# Patient Record
Sex: MALE | Race: WHITE | NOT HISPANIC OR LATINO | URBAN - METROPOLITAN AREA
[De-identification: names, ages, dates, MRNs, and addresses within clinical notes are randomized per-mention and may not be internally consistent; named-entity substitution may affect disease eponyms.]

---

## 2017-01-05 ENCOUNTER — EMERGENCY (EMERGENCY)
Facility: HOSPITAL | Age: 67
LOS: 1 days | Discharge: HOME | End: 2017-01-05
Admitting: EMERGENCY MEDICINE
Payer: MEDICARE

## 2017-01-05 DIAGNOSIS — Z79.84 LONG TERM (CURRENT) USE OF ORAL HYPOGLYCEMIC DRUGS: ICD-10-CM

## 2017-01-05 DIAGNOSIS — F70 MILD INTELLECTUAL DISABILITIES: ICD-10-CM

## 2017-01-05 DIAGNOSIS — G40.909 EPILEPSY, UNSPECIFIED, NOT INTRACTABLE, WITHOUT STATUS EPILEPTICUS: ICD-10-CM

## 2017-01-05 DIAGNOSIS — E11.65 TYPE 2 DIABETES MELLITUS WITH HYPERGLYCEMIA: ICD-10-CM

## 2017-01-05 DIAGNOSIS — I10 ESSENTIAL (PRIMARY) HYPERTENSION: ICD-10-CM

## 2017-01-05 DIAGNOSIS — R73.9 HYPERGLYCEMIA, UNSPECIFIED: ICD-10-CM

## 2017-01-05 PROCEDURE — 99284 EMERGENCY DEPT VISIT MOD MDM: CPT

## 2017-02-05 LAB — PSA SERPL-MCNC: 0.82

## 2017-02-17 ENCOUNTER — APPOINTMENT (OUTPATIENT)
Dept: UROLOGY | Facility: CLINIC | Age: 67
End: 2017-02-17

## 2017-03-20 ENCOUNTER — APPOINTMENT (OUTPATIENT)
Dept: CARDIOLOGY | Facility: CLINIC | Age: 67
End: 2017-03-20

## 2017-03-23 ENCOUNTER — APPOINTMENT (OUTPATIENT)
Dept: CARDIOLOGY | Facility: CLINIC | Age: 67
End: 2017-03-23

## 2017-03-23 ENCOUNTER — NON-APPOINTMENT (OUTPATIENT)
Age: 67
End: 2017-03-23

## 2017-03-23 VITALS — DIASTOLIC BLOOD PRESSURE: 64 MMHG | RESPIRATION RATE: 14 BRPM | SYSTOLIC BLOOD PRESSURE: 102 MMHG

## 2017-03-23 VITALS — HEART RATE: 80 BPM | TEMPERATURE: 96.8 F | HEIGHT: 62 IN

## 2017-03-28 ENCOUNTER — RX RENEWAL (OUTPATIENT)
Age: 67
End: 2017-03-28

## 2017-04-07 ENCOUNTER — RX RENEWAL (OUTPATIENT)
Age: 67
End: 2017-04-07

## 2017-05-06 ENCOUNTER — RX RENEWAL (OUTPATIENT)
Age: 67
End: 2017-05-06

## 2017-05-09 ENCOUNTER — MEDICATION RENEWAL (OUTPATIENT)
Age: 67
End: 2017-05-09

## 2017-06-29 ENCOUNTER — NON-APPOINTMENT (OUTPATIENT)
Age: 67
End: 2017-06-29

## 2017-06-29 ENCOUNTER — APPOINTMENT (OUTPATIENT)
Dept: CARDIOLOGY | Facility: CLINIC | Age: 67
End: 2017-06-29

## 2017-06-29 VITALS — DIASTOLIC BLOOD PRESSURE: 68 MMHG | RESPIRATION RATE: 14 BRPM | SYSTOLIC BLOOD PRESSURE: 102 MMHG

## 2017-06-29 VITALS
HEIGHT: 62 IN | BODY MASS INDEX: 32.02 KG/M2 | WEIGHT: 174 LBS | OXYGEN SATURATION: 96 % | HEART RATE: 84 BPM | TEMPERATURE: 98.6 F

## 2017-07-01 ENCOUNTER — EMERGENCY (EMERGENCY)
Facility: HOSPITAL | Age: 67
LOS: 0 days | Discharge: HOME | End: 2017-07-01
Attending: EMERGENCY MEDICINE | Admitting: EMERGENCY MEDICINE
Payer: MEDICARE

## 2017-07-01 VITALS
HEIGHT: 62 IN | HEART RATE: 80 BPM | SYSTOLIC BLOOD PRESSURE: 110 MMHG | RESPIRATION RATE: 16 BRPM | WEIGHT: 199.96 LBS | OXYGEN SATURATION: 99 % | DIASTOLIC BLOOD PRESSURE: 64 MMHG

## 2017-07-01 VITALS
OXYGEN SATURATION: 100 % | SYSTOLIC BLOOD PRESSURE: 115 MMHG | DIASTOLIC BLOOD PRESSURE: 86 MMHG | HEART RATE: 74 BPM | RESPIRATION RATE: 15 BRPM

## 2017-07-01 DIAGNOSIS — R91.1 SOLITARY PULMONARY NODULE: ICD-10-CM
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
## 2017-07-01 DIAGNOSIS — G20 PARKINSON'S DISEASE: ICD-10-CM
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
## 2017-07-01 DIAGNOSIS — Z98.890 OTHER SPECIFIED POSTPROCEDURAL STATES: Chronic | ICD-10-CM

## 2017-07-01 DIAGNOSIS — Z98.890 OTHER SPECIFIED POSTPROCEDURAL STATES: ICD-10-CM

## 2017-07-01 DIAGNOSIS — S09.90XA UNSPECIFIED INJURY OF HEAD, INITIAL ENCOUNTER: ICD-10-CM

## 2017-07-01 DIAGNOSIS — I67.89 OTHER CEREBROVASCULAR DISEASE: ICD-10-CM

## 2017-07-01 PROCEDURE — 99285 EMERGENCY DEPT VISIT HI MDM: CPT

## 2017-07-01 PROCEDURE — 72190 X-RAY EXAM OF PELVIS: CPT | Mod: 26

## 2017-07-01 PROCEDURE — 70450 CT HEAD/BRAIN W/O DYE: CPT | Mod: 26

## 2017-07-01 PROCEDURE — 71010: CPT | Mod: 26

## 2017-07-01 PROCEDURE — 74176 CT ABD & PELVIS W/O CONTRAST: CPT | Mod: 26

## 2017-07-01 PROCEDURE — 72125 CT NECK SPINE W/O DYE: CPT | Mod: 26

## 2017-07-01 PROCEDURE — 71250 CT THORAX DX C-: CPT | Mod: 26

## 2017-07-01 NOTE — ED ADULT NURSE NOTE - CHPI ED SYMPTOMS NEG
no loss of consciousness/no numbness/no bleeding/no tingling/no vomiting/no fever/no deformity/no abrasion/no confusion

## 2017-07-01 NOTE — ED ADULT NURSE NOTE - OBJECTIVE STATEMENT
fall last night, denies hitting head as per aid. no known blood thinnerss. hx of parkinsons with increasing shakiness and needed assistance from group home. pt wears helmet at baseline.

## 2017-07-01 NOTE — ED PROVIDER NOTE - ENMT, MLM
Alert-The patient is alert, awake and responds to voice. The patient is oriented to time, place, and person. The triage nurse is able to obtain subjective information. Airway patent, Nasal mucosa clear. Mouth with normal mucosa. Throat has no vesicles, no oropharyngeal exudates and uvula is midline.

## 2017-07-01 NOTE — ED PROVIDER NOTE - CHPI ED SYMPTOMS NEG
no abrasion/no bleeding/no confusion/no vomiting/no loss of consciousness/no deformity/no tingling/no weakness/no fever/no numbness/NO PAIN

## 2017-07-01 NOTE — ED PROVIDER NOTE - CARE PLAN
Principal Discharge DX:	Closed head injury, initial encounter Principal Discharge DX:	Pulmonary nodule  Secondary Diagnosis:	Fall in home, initial encounter  Secondary Diagnosis:	Closed head injury, initial encounter

## 2017-07-01 NOTE — ED PROVIDER NOTE - PROGRESS NOTE DETAILS
no acute fx on ct.   pulmonary nodule and renal lesion to be follow ed with primary care  Return to the ER immediately for any worsening symptoms, concerns, chest pain, fevers, shortness of breath, vomiting, abdominal pain, rashes, neck pain, back pain, numbness, paresthesias, pain or any difficulties at all.  Please follow up with your own private physician or our medical clinic at [phone] in the next 2-3 days.  Find a doctor at [phone].  Copies of your tests were provided to you for follow-up.  You must address all your findings with your doctor.

## 2017-07-01 NOTE — ED PROVIDER NOTE - OBJECTIVE STATEMENT
68 y/o M with PMHx of Parkinson's disease, leg surgery was BIBA for a witnessed fall out of bed this morning. Pt denies LOC. Pt denies any complaints. No HA, SOB, back/neck pain, hip pain, extremity pain, abd pain, CP, fever.

## 2017-07-12 ENCOUNTER — APPOINTMENT (OUTPATIENT)
Dept: UROLOGY | Facility: CLINIC | Age: 67
End: 2017-07-12

## 2017-08-03 ENCOUNTER — EMERGENCY (EMERGENCY)
Facility: HOSPITAL | Age: 67
LOS: 1 days | Discharge: HOME | End: 2017-08-03
Attending: EMERGENCY MEDICINE | Admitting: EMERGENCY MEDICINE
Payer: MEDICARE

## 2017-08-03 VITALS
DIASTOLIC BLOOD PRESSURE: 68 MMHG | SYSTOLIC BLOOD PRESSURE: 102 MMHG | TEMPERATURE: 97 F | RESPIRATION RATE: 15 BRPM | OXYGEN SATURATION: 95 % | HEART RATE: 69 BPM

## 2017-08-03 VITALS
DIASTOLIC BLOOD PRESSURE: 72 MMHG | OXYGEN SATURATION: 98 % | TEMPERATURE: 99 F | RESPIRATION RATE: 16 BRPM | HEART RATE: 74 BPM | SYSTOLIC BLOOD PRESSURE: 104 MMHG | WEIGHT: 154.1 LBS

## 2017-08-03 DIAGNOSIS — R41.82 ALTERED MENTAL STATUS, UNSPECIFIED: ICD-10-CM

## 2017-08-03 DIAGNOSIS — Z98.890 OTHER SPECIFIED POSTPROCEDURAL STATES: Chronic | ICD-10-CM

## 2017-08-03 DIAGNOSIS — G20 PARKINSON'S DISEASE: ICD-10-CM

## 2017-08-03 DIAGNOSIS — F41.0 PANIC DISORDER [EPISODIC PAROXYSMAL ANXIETY]: ICD-10-CM

## 2017-08-03 LAB
ALBUMIN SERPL ELPH-MCNC: 3.1 G/DL — LOW (ref 3.3–5)
ALP SERPL-CCNC: 49 U/L — SIGNIFICANT CHANGE UP (ref 40–120)
ALT FLD-CCNC: 13 U/L — SIGNIFICANT CHANGE UP (ref 12–78)
ANION GAP SERPL CALC-SCNC: 10 MMOL/L — SIGNIFICANT CHANGE UP (ref 5–17)
APPEARANCE UR: CLEAR — SIGNIFICANT CHANGE UP
AST SERPL-CCNC: 12 U/L — LOW (ref 15–37)
BASOPHILS # BLD AUTO: 0.1 K/UL — SIGNIFICANT CHANGE UP (ref 0–0.2)
BASOPHILS NFR BLD AUTO: 1.7 % — SIGNIFICANT CHANGE UP (ref 0–2)
BILIRUB SERPL-MCNC: 0.3 MG/DL — SIGNIFICANT CHANGE UP (ref 0.2–1.2)
BILIRUB UR-MCNC: NEGATIVE — SIGNIFICANT CHANGE UP
BUN SERPL-MCNC: 15 MG/DL — SIGNIFICANT CHANGE UP (ref 7–23)
CALCIUM SERPL-MCNC: 8.2 MG/DL — LOW (ref 8.5–10.1)
CHLORIDE SERPL-SCNC: 97 MMOL/L — SIGNIFICANT CHANGE UP (ref 96–108)
CO2 SERPL-SCNC: 26 MMOL/L — SIGNIFICANT CHANGE UP (ref 22–31)
COLOR SPEC: YELLOW — SIGNIFICANT CHANGE UP
CREAT SERPL-MCNC: 0.57 MG/DL — SIGNIFICANT CHANGE UP (ref 0.5–1.3)
DIFF PNL FLD: NEGATIVE — SIGNIFICANT CHANGE UP
EOSINOPHIL # BLD AUTO: 0.1 K/UL — SIGNIFICANT CHANGE UP (ref 0–0.5)
EOSINOPHIL NFR BLD AUTO: 1.5 % — SIGNIFICANT CHANGE UP (ref 0–6)
GLUCOSE SERPL-MCNC: 115 MG/DL — HIGH (ref 70–99)
GLUCOSE UR QL: 50 MG/DL
HCT VFR BLD CALC: 39.6 % — SIGNIFICANT CHANGE UP (ref 39–50)
HGB BLD-MCNC: 13.4 G/DL — SIGNIFICANT CHANGE UP (ref 13–17)
KETONES UR-MCNC: ABNORMAL
LEUKOCYTE ESTERASE UR-ACNC: NEGATIVE — SIGNIFICANT CHANGE UP
LYMPHOCYTES # BLD AUTO: 2.5 K/UL — SIGNIFICANT CHANGE UP (ref 1–3.3)
LYMPHOCYTES # BLD AUTO: 36.4 % — SIGNIFICANT CHANGE UP (ref 13–44)
MCHC RBC-ENTMCNC: 32.2 PG — SIGNIFICANT CHANGE UP (ref 27–34)
MCHC RBC-ENTMCNC: 33.8 GM/DL — SIGNIFICANT CHANGE UP (ref 32–36)
MCV RBC AUTO: 95.2 FL — SIGNIFICANT CHANGE UP (ref 80–100)
MONOCYTES # BLD AUTO: 0.5 K/UL — SIGNIFICANT CHANGE UP (ref 0–0.9)
MONOCYTES NFR BLD AUTO: 7 % — SIGNIFICANT CHANGE UP (ref 1–9)
NEUTROPHILS # BLD AUTO: 3.7 K/UL — SIGNIFICANT CHANGE UP (ref 1.8–7.4)
NEUTROPHILS NFR BLD AUTO: 53.4 % — SIGNIFICANT CHANGE UP (ref 43–77)
NITRITE UR-MCNC: NEGATIVE — SIGNIFICANT CHANGE UP
PH UR: 7 — SIGNIFICANT CHANGE UP (ref 5–8)
PLATELET # BLD AUTO: 128 K/UL — LOW (ref 150–400)
POTASSIUM SERPL-MCNC: 4.4 MMOL/L — SIGNIFICANT CHANGE UP (ref 3.5–5.3)
POTASSIUM SERPL-SCNC: 4.4 MMOL/L — SIGNIFICANT CHANGE UP (ref 3.5–5.3)
PROT SERPL-MCNC: 6.3 G/DL — SIGNIFICANT CHANGE UP (ref 6–8.3)
PROT UR-MCNC: NEGATIVE — SIGNIFICANT CHANGE UP
RBC # BLD: 4.15 M/UL — LOW (ref 4.2–5.8)
RBC # FLD: 12.5 % — SIGNIFICANT CHANGE UP (ref 10.3–14.5)
SODIUM SERPL-SCNC: 133 MMOL/L — LOW (ref 135–145)
SP GR SPEC: 1 — LOW (ref 1.01–1.02)
UROBILINOGEN FLD QL: NEGATIVE — SIGNIFICANT CHANGE UP
WBC # BLD: 7 K/UL — SIGNIFICANT CHANGE UP (ref 3.8–10.5)
WBC # FLD AUTO: 7 K/UL — SIGNIFICANT CHANGE UP (ref 3.8–10.5)

## 2017-08-03 PROCEDURE — 80053 COMPREHEN METABOLIC PANEL: CPT

## 2017-08-03 PROCEDURE — 36415 COLL VENOUS BLD VENIPUNCTURE: CPT

## 2017-08-03 PROCEDURE — 81003 URINALYSIS AUTO W/O SCOPE: CPT

## 2017-08-03 PROCEDURE — 87086 URINE CULTURE/COLONY COUNT: CPT

## 2017-08-03 PROCEDURE — 93005 ELECTROCARDIOGRAM TRACING: CPT

## 2017-08-03 PROCEDURE — 99284 EMERGENCY DEPT VISIT MOD MDM: CPT

## 2017-08-03 PROCEDURE — 99283 EMERGENCY DEPT VISIT LOW MDM: CPT | Mod: 25

## 2017-08-03 PROCEDURE — 85027 COMPLETE CBC AUTOMATED: CPT

## 2017-08-03 NOTE — ED ADULT NURSE NOTE - OBJECTIVE STATEMENT
pt knows name and , knows its daytime, aide at bedside states pt does not always know month/year. pt denies any pain, also states he feels fine. as per aide pt is acting normally

## 2017-08-03 NOTE — ED PROVIDER NOTE - MEDICAL DECISION MAKING DETAILS
67 male sent for evaluation of disorientation, f/u labs, ua, ekg, patient currently alert, follows commands, no acute distress

## 2017-08-03 NOTE — ED PROVIDER NOTE - OBJECTIVE STATEMENT
67 male from [facility] group home sent to ER for evaluation of disorientation. Patient sitting in wheelchair, wearing helmet, aide at the bedside states he is acting his normal self. Patient states he does not know why he is in the ER, denies pain, follows commands.

## 2017-08-04 LAB
CULTURE RESULTS: SIGNIFICANT CHANGE UP
SPECIMEN SOURCE: SIGNIFICANT CHANGE UP

## 2017-08-18 ENCOUNTER — APPOINTMENT (OUTPATIENT)
Dept: UROLOGY | Facility: CLINIC | Age: 67
End: 2017-08-18
Payer: MEDICARE

## 2017-08-18 VITALS — BODY MASS INDEX: 30 KG/M2 | HEIGHT: 62 IN | WEIGHT: 163 LBS

## 2017-08-18 PROCEDURE — 99213 OFFICE O/P EST LOW 20 MIN: CPT

## 2017-09-13 ENCOUNTER — MEDICATION RENEWAL (OUTPATIENT)
Age: 67
End: 2017-09-13

## 2017-09-26 ENCOUNTER — APPOINTMENT (OUTPATIENT)
Dept: CARDIOLOGY | Facility: CLINIC | Age: 67
End: 2017-09-26

## 2017-10-09 ENCOUNTER — APPOINTMENT (OUTPATIENT)
Dept: CARDIOLOGY | Facility: CLINIC | Age: 67
End: 2017-10-09

## 2017-11-13 ENCOUNTER — APPOINTMENT (OUTPATIENT)
Dept: CARDIOLOGY | Facility: CLINIC | Age: 67
End: 2017-11-13

## 2017-11-25 ENCOUNTER — EMERGENCY (EMERGENCY)
Facility: HOSPITAL | Age: 67
LOS: 0 days | Discharge: HOME | End: 2017-11-25
Attending: EMERGENCY MEDICINE | Admitting: EMERGENCY MEDICINE
Payer: MEDICARE

## 2017-11-25 VITALS
OXYGEN SATURATION: 99 % | HEART RATE: 96 BPM | SYSTOLIC BLOOD PRESSURE: 134 MMHG | RESPIRATION RATE: 18 BRPM | HEIGHT: 66 IN | WEIGHT: 154.98 LBS | TEMPERATURE: 98 F | DIASTOLIC BLOOD PRESSURE: 94 MMHG

## 2017-11-25 VITALS
RESPIRATION RATE: 16 BRPM | OXYGEN SATURATION: 93 % | HEART RATE: 94 BPM | DIASTOLIC BLOOD PRESSURE: 83 MMHG | TEMPERATURE: 98 F | SYSTOLIC BLOOD PRESSURE: 133 MMHG

## 2017-11-25 DIAGNOSIS — G20 PARKINSON'S DISEASE: ICD-10-CM

## 2017-11-25 DIAGNOSIS — Z98.890 OTHER SPECIFIED POSTPROCEDURAL STATES: Chronic | ICD-10-CM

## 2017-11-25 DIAGNOSIS — R11.10 VOMITING, UNSPECIFIED: ICD-10-CM

## 2017-11-25 DIAGNOSIS — K59.00 CONSTIPATION, UNSPECIFIED: ICD-10-CM

## 2017-11-25 DIAGNOSIS — R56.9 UNSPECIFIED CONVULSIONS: ICD-10-CM

## 2017-11-25 LAB
ALBUMIN SERPL ELPH-MCNC: 3 G/DL — LOW (ref 3.3–5)
ALP SERPL-CCNC: 58 U/L — SIGNIFICANT CHANGE UP (ref 40–120)
ALT FLD-CCNC: <6 U/L — LOW (ref 12–78)
ANION GAP SERPL CALC-SCNC: 6 MMOL/L — SIGNIFICANT CHANGE UP (ref 5–17)
AST SERPL-CCNC: 11 U/L — LOW (ref 15–37)
BASOPHILS # BLD AUTO: 0 K/UL — SIGNIFICANT CHANGE UP (ref 0–0.2)
BASOPHILS NFR BLD AUTO: 0.4 % — SIGNIFICANT CHANGE UP (ref 0–2)
BILIRUB SERPL-MCNC: 0.4 MG/DL — SIGNIFICANT CHANGE UP (ref 0.2–1.2)
BUN SERPL-MCNC: 20 MG/DL — SIGNIFICANT CHANGE UP (ref 7–23)
CALCIUM SERPL-MCNC: 9.4 MG/DL — SIGNIFICANT CHANGE UP (ref 8.5–10.1)
CHLORIDE SERPL-SCNC: 97 MMOL/L — SIGNIFICANT CHANGE UP (ref 96–108)
CO2 SERPL-SCNC: 31 MMOL/L — SIGNIFICANT CHANGE UP (ref 22–31)
CREAT SERPL-MCNC: 0.81 MG/DL — SIGNIFICANT CHANGE UP (ref 0.5–1.3)
EOSINOPHIL # BLD AUTO: 0 K/UL — SIGNIFICANT CHANGE UP (ref 0–0.5)
EOSINOPHIL NFR BLD AUTO: 0 % — SIGNIFICANT CHANGE UP (ref 0–6)
GLUCOSE SERPL-MCNC: 233 MG/DL — HIGH (ref 70–99)
HCT VFR BLD CALC: 40.8 % — SIGNIFICANT CHANGE UP (ref 39–50)
HGB BLD-MCNC: 13.7 G/DL — SIGNIFICANT CHANGE UP (ref 13–17)
LIDOCAIN IGE QN: 51 U/L — LOW (ref 73–393)
LYMPHOCYTES # BLD AUTO: 0.6 K/UL — LOW (ref 1–3.3)
LYMPHOCYTES # BLD AUTO: 5.2 % — LOW (ref 13–44)
MCHC RBC-ENTMCNC: 31.8 PG — SIGNIFICANT CHANGE UP (ref 27–34)
MCHC RBC-ENTMCNC: 33.5 GM/DL — SIGNIFICANT CHANGE UP (ref 32–36)
MCV RBC AUTO: 94.8 FL — SIGNIFICANT CHANGE UP (ref 80–100)
MONOCYTES # BLD AUTO: 0.6 K/UL — SIGNIFICANT CHANGE UP (ref 0–0.9)
MONOCYTES NFR BLD AUTO: 5.2 % — SIGNIFICANT CHANGE UP (ref 2–14)
NEUTROPHILS # BLD AUTO: 10 K/UL — HIGH (ref 1.8–7.4)
NEUTROPHILS NFR BLD AUTO: 89.2 % — HIGH (ref 43–77)
PLATELET # BLD AUTO: 158 K/UL — SIGNIFICANT CHANGE UP (ref 150–400)
POTASSIUM SERPL-MCNC: 4.5 MMOL/L — SIGNIFICANT CHANGE UP (ref 3.5–5.3)
POTASSIUM SERPL-SCNC: 4.5 MMOL/L — SIGNIFICANT CHANGE UP (ref 3.5–5.3)
PROT SERPL-MCNC: 6.5 GM/DL — SIGNIFICANT CHANGE UP (ref 6–8.3)
RBC # BLD: 4.3 M/UL — SIGNIFICANT CHANGE UP (ref 4.2–5.8)
RBC # FLD: 12.6 % — SIGNIFICANT CHANGE UP (ref 10.3–14.5)
SODIUM SERPL-SCNC: 134 MMOL/L — LOW (ref 135–145)
WBC # BLD: 11.2 K/UL — HIGH (ref 3.8–10.5)
WBC # FLD AUTO: 11.2 K/UL — HIGH (ref 3.8–10.5)

## 2017-11-25 PROCEDURE — 74176 CT ABD & PELVIS W/O CONTRAST: CPT | Mod: 26

## 2017-11-25 PROCEDURE — 99284 EMERGENCY DEPT VISIT MOD MDM: CPT

## 2017-11-25 RX ORDER — MULTIVIT WITH MIN/MFOLATE/K2 340-15/3 G
230 POWDER (GRAM) ORAL ONCE
Qty: 0 | Refills: 0 | Status: COMPLETED | OUTPATIENT
Start: 2017-11-25 | End: 2017-11-25

## 2017-11-25 RX ORDER — FAMOTIDINE 10 MG/ML
20 INJECTION INTRAVENOUS ONCE
Qty: 0 | Refills: 0 | Status: COMPLETED | OUTPATIENT
Start: 2017-11-25 | End: 2017-11-25

## 2017-11-25 RX ORDER — SODIUM CHLORIDE 9 MG/ML
1000 INJECTION INTRAMUSCULAR; INTRAVENOUS; SUBCUTANEOUS ONCE
Qty: 0 | Refills: 0 | Status: COMPLETED | OUTPATIENT
Start: 2017-11-25 | End: 2017-11-25

## 2017-11-25 RX ORDER — SODIUM CHLORIDE 9 MG/ML
3 INJECTION INTRAMUSCULAR; INTRAVENOUS; SUBCUTANEOUS ONCE
Qty: 0 | Refills: 0 | Status: COMPLETED | OUTPATIENT
Start: 2017-11-25 | End: 2017-11-25

## 2017-11-25 RX ORDER — ONDANSETRON 8 MG/1
4 TABLET, FILM COATED ORAL ONCE
Qty: 0 | Refills: 0 | Status: COMPLETED | OUTPATIENT
Start: 2017-11-25 | End: 2017-11-25

## 2017-11-25 RX ADMIN — SODIUM CHLORIDE 1000 MILLILITER(S): 9 INJECTION INTRAMUSCULAR; INTRAVENOUS; SUBCUTANEOUS at 10:53

## 2017-11-25 RX ADMIN — ONDANSETRON 4 MILLIGRAM(S): 8 TABLET, FILM COATED ORAL at 10:54

## 2017-11-25 RX ADMIN — Medication 230 MILLILITER(S): at 14:45

## 2017-11-25 RX ADMIN — SODIUM CHLORIDE 3 MILLILITER(S): 9 INJECTION INTRAMUSCULAR; INTRAVENOUS; SUBCUTANEOUS at 10:53

## 2017-11-25 RX ADMIN — FAMOTIDINE 20 MILLIGRAM(S): 10 INJECTION INTRAVENOUS at 10:54

## 2017-12-07 ENCOUNTER — APPOINTMENT (OUTPATIENT)
Dept: CARDIOLOGY | Facility: CLINIC | Age: 67
End: 2017-12-07
Payer: MEDICARE

## 2017-12-07 PROCEDURE — 93306 TTE W/DOPPLER COMPLETE: CPT

## 2017-12-18 ENCOUNTER — APPOINTMENT (OUTPATIENT)
Dept: CARDIOLOGY | Facility: CLINIC | Age: 67
End: 2017-12-18
Payer: MEDICARE

## 2017-12-18 ENCOUNTER — NON-APPOINTMENT (OUTPATIENT)
Age: 67
End: 2017-12-18

## 2017-12-18 VITALS
HEART RATE: 85 BPM | OXYGEN SATURATION: 93 % | DIASTOLIC BLOOD PRESSURE: 66 MMHG | HEIGHT: 62 IN | SYSTOLIC BLOOD PRESSURE: 96 MMHG

## 2017-12-18 PROCEDURE — 99214 OFFICE O/P EST MOD 30 MIN: CPT | Mod: 25

## 2017-12-18 PROCEDURE — 93000 ELECTROCARDIOGRAM COMPLETE: CPT

## 2017-12-18 RX ORDER — FLUTICASONE PROPIONATE 50 UG/1
50 SPRAY, METERED NASAL
Qty: 16 | Refills: 0 | Status: ACTIVE | COMMUNITY
Start: 2017-09-20

## 2017-12-18 RX ORDER — DIVALPROEX SODIUM 500 MG/1
500 TABLET, DELAYED RELEASE ORAL TWICE DAILY
Refills: 0 | Status: ACTIVE | COMMUNITY

## 2018-02-09 ENCOUNTER — RX RENEWAL (OUTPATIENT)
Age: 68
End: 2018-02-09

## 2018-06-18 ENCOUNTER — APPOINTMENT (OUTPATIENT)
Dept: CARDIOLOGY | Facility: CLINIC | Age: 68
End: 2018-06-18
Payer: MEDICARE

## 2018-06-18 ENCOUNTER — NON-APPOINTMENT (OUTPATIENT)
Age: 68
End: 2018-06-18

## 2018-06-18 VITALS
OXYGEN SATURATION: 93 % | BODY MASS INDEX: 30.73 KG/M2 | TEMPERATURE: 97.2 F | HEART RATE: 88 BPM | WEIGHT: 167 LBS | HEIGHT: 62 IN

## 2018-06-18 VITALS — DIASTOLIC BLOOD PRESSURE: 70 MMHG | RESPIRATION RATE: 14 BRPM | SYSTOLIC BLOOD PRESSURE: 106 MMHG

## 2018-06-18 PROCEDURE — 99214 OFFICE O/P EST MOD 30 MIN: CPT | Mod: 25

## 2018-06-18 PROCEDURE — 93000 ELECTROCARDIOGRAM COMPLETE: CPT

## 2018-06-18 RX ORDER — AZITHROMYCIN 250 MG/1
250 TABLET, FILM COATED ORAL
Qty: 6 | Refills: 0 | Status: DISCONTINUED | COMMUNITY
Start: 2017-09-18 | End: 2018-06-18

## 2018-07-23 ENCOUNTER — INPATIENT (INPATIENT)
Facility: HOSPITAL | Age: 68
LOS: 2 days | Discharge: HOME | End: 2018-07-26
Attending: INTERNAL MEDICINE | Admitting: INTERNAL MEDICINE
Payer: MEDICARE

## 2018-07-23 VITALS
TEMPERATURE: 98 F | DIASTOLIC BLOOD PRESSURE: 77 MMHG | RESPIRATION RATE: 18 BRPM | SYSTOLIC BLOOD PRESSURE: 113 MMHG | OXYGEN SATURATION: 95 % | HEART RATE: 94 BPM | WEIGHT: 169.98 LBS

## 2018-07-23 DIAGNOSIS — Z98.890 OTHER SPECIFIED POSTPROCEDURAL STATES: Chronic | ICD-10-CM

## 2018-07-23 PROBLEM — R56.9 UNSPECIFIED CONVULSIONS: Chronic | Status: ACTIVE | Noted: 2017-11-25

## 2018-07-23 PROBLEM — G20 PARKINSON'S DISEASE: Chronic | Status: ACTIVE | Noted: 2017-07-01

## 2018-07-23 LAB
ALBUMIN SERPL ELPH-MCNC: 2.6 G/DL — LOW (ref 3.3–5)
ALP SERPL-CCNC: 48 U/L — SIGNIFICANT CHANGE UP (ref 40–120)
ALT FLD-CCNC: 7 U/L — LOW (ref 12–78)
ANION GAP SERPL CALC-SCNC: 9 MMOL/L — SIGNIFICANT CHANGE UP (ref 5–17)
APPEARANCE UR: CLEAR — SIGNIFICANT CHANGE UP
APTT BLD: 36.9 SEC — SIGNIFICANT CHANGE UP (ref 27.5–37.4)
AST SERPL-CCNC: 10 U/L — LOW (ref 15–37)
BASOPHILS # BLD AUTO: 0.02 K/UL — SIGNIFICANT CHANGE UP (ref 0–0.2)
BASOPHILS NFR BLD AUTO: 0.3 % — SIGNIFICANT CHANGE UP (ref 0–2)
BILIRUB SERPL-MCNC: 0.4 MG/DL — SIGNIFICANT CHANGE UP (ref 0.2–1.2)
BILIRUB UR-MCNC: NEGATIVE — SIGNIFICANT CHANGE UP
BUN SERPL-MCNC: 12 MG/DL — SIGNIFICANT CHANGE UP (ref 7–23)
CALCIUM SERPL-MCNC: 8.8 MG/DL — SIGNIFICANT CHANGE UP (ref 8.5–10.1)
CHLORIDE SERPL-SCNC: 95 MMOL/L — LOW (ref 96–108)
CO2 SERPL-SCNC: 29 MMOL/L — SIGNIFICANT CHANGE UP (ref 22–31)
COLOR SPEC: YELLOW — SIGNIFICANT CHANGE UP
CREAT SERPL-MCNC: 0.72 MG/DL — SIGNIFICANT CHANGE UP (ref 0.5–1.3)
DIFF PNL FLD: ABNORMAL
EOSINOPHIL # BLD AUTO: 0.04 K/UL — SIGNIFICANT CHANGE UP (ref 0–0.5)
EOSINOPHIL NFR BLD AUTO: 0.6 % — SIGNIFICANT CHANGE UP (ref 0–6)
GLUCOSE BLDC GLUCOMTR-MCNC: 132 MG/DL — HIGH (ref 70–99)
GLUCOSE SERPL-MCNC: 116 MG/DL — HIGH (ref 70–99)
GLUCOSE UR QL: NEGATIVE MG/DL — SIGNIFICANT CHANGE UP
HCT VFR BLD CALC: 37.3 % — LOW (ref 39–50)
HGB BLD-MCNC: 13.1 G/DL — SIGNIFICANT CHANGE UP (ref 13–17)
IMM GRANULOCYTES NFR BLD AUTO: 0.8 % — SIGNIFICANT CHANGE UP (ref 0–1.5)
INR BLD: 1.06 RATIO — SIGNIFICANT CHANGE UP (ref 0.88–1.16)
KETONES UR-MCNC: ABNORMAL
LEUKOCYTE ESTERASE UR-ACNC: NEGATIVE — SIGNIFICANT CHANGE UP
LYMPHOCYTES # BLD AUTO: 1.94 K/UL — SIGNIFICANT CHANGE UP (ref 1–3.3)
LYMPHOCYTES # BLD AUTO: 30.5 % — SIGNIFICANT CHANGE UP (ref 13–44)
MCHC RBC-ENTMCNC: 31 PG — SIGNIFICANT CHANGE UP (ref 27–34)
MCHC RBC-ENTMCNC: 35.1 GM/DL — SIGNIFICANT CHANGE UP (ref 32–36)
MCV RBC AUTO: 88.4 FL — SIGNIFICANT CHANGE UP (ref 80–100)
MONOCYTES # BLD AUTO: 0.68 K/UL — SIGNIFICANT CHANGE UP (ref 0–0.9)
MONOCYTES NFR BLD AUTO: 10.7 % — SIGNIFICANT CHANGE UP (ref 2–14)
NEUTROPHILS # BLD AUTO: 3.63 K/UL — SIGNIFICANT CHANGE UP (ref 1.8–7.4)
NEUTROPHILS NFR BLD AUTO: 57.1 % — SIGNIFICANT CHANGE UP (ref 43–77)
NITRITE UR-MCNC: NEGATIVE — SIGNIFICANT CHANGE UP
PH UR: 8 — SIGNIFICANT CHANGE UP (ref 5–8)
PLATELET # BLD AUTO: 145 K/UL — LOW (ref 150–400)
POTASSIUM SERPL-MCNC: 4.4 MMOL/L — SIGNIFICANT CHANGE UP (ref 3.5–5.3)
POTASSIUM SERPL-SCNC: 4.4 MMOL/L — SIGNIFICANT CHANGE UP (ref 3.5–5.3)
PROT SERPL-MCNC: 5.8 GM/DL — LOW (ref 6–8.3)
PROT UR-MCNC: NEGATIVE MG/DL — SIGNIFICANT CHANGE UP
PROTHROM AB SERPL-ACNC: 11.5 SEC — SIGNIFICANT CHANGE UP (ref 9.8–12.7)
RBC # BLD: 4.22 M/UL — SIGNIFICANT CHANGE UP (ref 4.2–5.8)
RBC # FLD: 12.3 % — SIGNIFICANT CHANGE UP (ref 10.3–14.5)
SODIUM SERPL-SCNC: 133 MMOL/L — LOW (ref 135–145)
SP GR SPEC: 1.01 — SIGNIFICANT CHANGE UP (ref 1.01–1.02)
TROPONIN I SERPL-MCNC: <0.015 NG/ML — SIGNIFICANT CHANGE UP (ref 0.01–0.04)
TYPE + AB SCN PNL BLD: SIGNIFICANT CHANGE UP
UROBILINOGEN FLD QL: NEGATIVE MG/DL — SIGNIFICANT CHANGE UP
WBC # BLD: 6.36 K/UL — SIGNIFICANT CHANGE UP (ref 3.8–10.5)
WBC # FLD AUTO: 6.36 K/UL — SIGNIFICANT CHANGE UP (ref 3.8–10.5)

## 2018-07-23 PROCEDURE — 72125 CT NECK SPINE W/O DYE: CPT | Mod: 26

## 2018-07-23 PROCEDURE — 74177 CT ABD & PELVIS W/CONTRAST: CPT | Mod: 26

## 2018-07-23 PROCEDURE — 99285 EMERGENCY DEPT VISIT HI MDM: CPT

## 2018-07-23 PROCEDURE — 71045 X-RAY EXAM CHEST 1 VIEW: CPT | Mod: 26

## 2018-07-23 PROCEDURE — 70450 CT HEAD/BRAIN W/O DYE: CPT | Mod: 26

## 2018-07-23 PROCEDURE — 71260 CT THORAX DX C+: CPT | Mod: 26

## 2018-07-23 PROCEDURE — 72190 X-RAY EXAM OF PELVIS: CPT | Mod: 26

## 2018-07-23 PROCEDURE — 93010 ELECTROCARDIOGRAM REPORT: CPT

## 2018-07-23 PROCEDURE — 73502 X-RAY EXAM HIP UNI 2-3 VIEWS: CPT | Mod: 26

## 2018-07-23 RX ORDER — QUETIAPINE FUMARATE 200 MG/1
50 TABLET, FILM COATED ORAL DAILY
Qty: 0 | Refills: 0 | Status: DISCONTINUED | OUTPATIENT
Start: 2018-07-23 | End: 2018-07-26

## 2018-07-23 RX ORDER — SODIUM CHLORIDE 9 MG/ML
1 INJECTION INTRAMUSCULAR; INTRAVENOUS; SUBCUTANEOUS DAILY
Qty: 0 | Refills: 0 | Status: DISCONTINUED | OUTPATIENT
Start: 2018-07-23 | End: 2018-07-26

## 2018-07-23 RX ORDER — CARBIDOPA AND LEVODOPA 25; 100 MG/1; MG/1
1 TABLET ORAL AT BEDTIME
Qty: 0 | Refills: 0 | Status: DISCONTINUED | OUTPATIENT
Start: 2018-07-23 | End: 2018-07-26

## 2018-07-23 RX ORDER — DEXTROSE 50 % IN WATER 50 %
15 SYRINGE (ML) INTRAVENOUS ONCE
Qty: 0 | Refills: 0 | Status: DISCONTINUED | OUTPATIENT
Start: 2018-07-23 | End: 2018-07-26

## 2018-07-23 RX ORDER — DEXTROSE 50 % IN WATER 50 %
12.5 SYRINGE (ML) INTRAVENOUS ONCE
Qty: 0 | Refills: 0 | Status: DISCONTINUED | OUTPATIENT
Start: 2018-07-23 | End: 2018-07-26

## 2018-07-23 RX ORDER — GLUCAGON INJECTION, SOLUTION 0.5 MG/.1ML
1 INJECTION, SOLUTION SUBCUTANEOUS ONCE
Qty: 0 | Refills: 0 | Status: DISCONTINUED | OUTPATIENT
Start: 2018-07-23 | End: 2018-07-26

## 2018-07-23 RX ORDER — ESCITALOPRAM OXALATE 10 MG/1
40 TABLET, FILM COATED ORAL DAILY
Qty: 0 | Refills: 0 | Status: DISCONTINUED | OUTPATIENT
Start: 2018-07-23 | End: 2018-07-26

## 2018-07-23 RX ORDER — ASPIRIN/CALCIUM CARB/MAGNESIUM 324 MG
81 TABLET ORAL DAILY
Qty: 0 | Refills: 0 | Status: DISCONTINUED | OUTPATIENT
Start: 2018-07-23 | End: 2018-07-26

## 2018-07-23 RX ORDER — DEXTROSE 50 % IN WATER 50 %
25 SYRINGE (ML) INTRAVENOUS ONCE
Qty: 0 | Refills: 0 | Status: DISCONTINUED | OUTPATIENT
Start: 2018-07-23 | End: 2018-07-26

## 2018-07-23 RX ORDER — METRONIDAZOLE 500 MG
500 TABLET ORAL ONCE
Qty: 0 | Refills: 0 | Status: COMPLETED | OUTPATIENT
Start: 2018-07-23 | End: 2018-07-23

## 2018-07-23 RX ORDER — METRONIDAZOLE 500 MG
500 TABLET ORAL EVERY 8 HOURS
Qty: 0 | Refills: 0 | Status: DISCONTINUED | OUTPATIENT
Start: 2018-07-23 | End: 2018-07-26

## 2018-07-23 RX ORDER — LEVETIRACETAM 250 MG/1
750 TABLET, FILM COATED ORAL
Qty: 0 | Refills: 0 | Status: DISCONTINUED | OUTPATIENT
Start: 2018-07-23 | End: 2018-07-26

## 2018-07-23 RX ORDER — DOCUSATE SODIUM 100 MG
100 CAPSULE ORAL
Qty: 0 | Refills: 0 | Status: DISCONTINUED | OUTPATIENT
Start: 2018-07-23 | End: 2018-07-26

## 2018-07-23 RX ORDER — ACETAMINOPHEN 500 MG
650 TABLET ORAL EVERY 6 HOURS
Qty: 0 | Refills: 0 | Status: DISCONTINUED | OUTPATIENT
Start: 2018-07-23 | End: 2018-07-26

## 2018-07-23 RX ORDER — ARIPIPRAZOLE 15 MG/1
30 TABLET ORAL AT BEDTIME
Qty: 0 | Refills: 0 | Status: DISCONTINUED | OUTPATIENT
Start: 2018-07-23 | End: 2018-07-26

## 2018-07-23 RX ORDER — KETOCONAZOLE 20 MG/G
1 AEROSOL, FOAM TOPICAL DAILY
Qty: 0 | Refills: 0 | Status: DISCONTINUED | OUTPATIENT
Start: 2018-07-23 | End: 2018-07-24

## 2018-07-23 RX ORDER — ARIPIPRAZOLE 15 MG/1
1 TABLET ORAL
Qty: 0 | Refills: 0 | COMMUNITY

## 2018-07-23 RX ORDER — LORATADINE 10 MG/1
10 TABLET ORAL DAILY
Qty: 0 | Refills: 0 | Status: DISCONTINUED | OUTPATIENT
Start: 2018-07-23 | End: 2018-07-26

## 2018-07-23 RX ORDER — DIVALPROEX SODIUM 500 MG/1
1000 TABLET, DELAYED RELEASE ORAL
Qty: 0 | Refills: 0 | Status: DISCONTINUED | OUTPATIENT
Start: 2018-07-23 | End: 2018-07-26

## 2018-07-23 RX ORDER — SODIUM CHLORIDE 9 MG/ML
1000 INJECTION INTRAMUSCULAR; INTRAVENOUS; SUBCUTANEOUS
Qty: 0 | Refills: 0 | Status: DISCONTINUED | OUTPATIENT
Start: 2018-07-23 | End: 2018-07-26

## 2018-07-23 RX ORDER — INSULIN LISPRO 100/ML
VIAL (ML) SUBCUTANEOUS EVERY 6 HOURS
Qty: 0 | Refills: 0 | Status: DISCONTINUED | OUTPATIENT
Start: 2018-07-23 | End: 2018-07-24

## 2018-07-23 RX ORDER — CIPROFLOXACIN LACTATE 400MG/40ML
400 VIAL (ML) INTRAVENOUS EVERY 12 HOURS
Qty: 0 | Refills: 0 | Status: DISCONTINUED | OUTPATIENT
Start: 2018-07-23 | End: 2018-07-26

## 2018-07-23 RX ORDER — CARBIDOPA AND LEVODOPA 25; 100 MG/1; MG/1
1 TABLET ORAL DAILY
Qty: 0 | Refills: 0 | Status: DISCONTINUED | OUTPATIENT
Start: 2018-07-23 | End: 2018-07-26

## 2018-07-23 RX ORDER — TAMSULOSIN HYDROCHLORIDE 0.4 MG/1
0.4 CAPSULE ORAL AT BEDTIME
Qty: 0 | Refills: 0 | Status: DISCONTINUED | OUTPATIENT
Start: 2018-07-23 | End: 2018-07-26

## 2018-07-23 RX ORDER — OXYBUTYNIN CHLORIDE 5 MG
10 TABLET ORAL DAILY
Qty: 0 | Refills: 0 | Status: DISCONTINUED | OUTPATIENT
Start: 2018-07-23 | End: 2018-07-26

## 2018-07-23 RX ORDER — CHOLECALCIFEROL (VITAMIN D3) 125 MCG
1000 CAPSULE ORAL DAILY
Qty: 0 | Refills: 0 | Status: DISCONTINUED | OUTPATIENT
Start: 2018-07-23 | End: 2018-07-26

## 2018-07-23 RX ORDER — SODIUM CHLORIDE 9 MG/ML
1000 INJECTION, SOLUTION INTRAVENOUS
Qty: 0 | Refills: 0 | Status: DISCONTINUED | OUTPATIENT
Start: 2018-07-23 | End: 2018-07-26

## 2018-07-23 RX ORDER — CIPROFLOXACIN LACTATE 400MG/40ML
400 VIAL (ML) INTRAVENOUS ONCE
Qty: 0 | Refills: 0 | Status: COMPLETED | OUTPATIENT
Start: 2018-07-23 | End: 2018-07-23

## 2018-07-23 RX ADMIN — Medication 200 MILLIGRAM(S): at 19:44

## 2018-07-23 RX ADMIN — CARBIDOPA AND LEVODOPA 1 TABLET(S): 25; 100 TABLET ORAL at 23:09

## 2018-07-23 RX ADMIN — ARIPIPRAZOLE 30 MILLIGRAM(S): 15 TABLET ORAL at 23:10

## 2018-07-23 RX ADMIN — TAMSULOSIN HYDROCHLORIDE 0.4 MILLIGRAM(S): 0.4 CAPSULE ORAL at 23:09

## 2018-07-23 RX ADMIN — SODIUM CHLORIDE 75 MILLILITER(S): 9 INJECTION INTRAMUSCULAR; INTRAVENOUS; SUBCUTANEOUS at 23:11

## 2018-07-23 RX ADMIN — Medication 100 MILLIGRAM(S): at 18:39

## 2018-07-23 NOTE — H&P ADULT - HISTORY OF PRESENT ILLNESS
67 y/o M with PMH of parkinson's disease, HTN, seizure disorder, NIDDM, hyponatremia, BPH, osteoporosis, GERD, tinea pedis, gait abnormality, nontoxic uninodular goiter, acquired cyst of kidney, h/o PNA, p/w lethargy. Patient is a poor historian and is not sure why he is in the hospital. Patient is from a group home and aide is at bedside. States that patient returned from camp 3 days ago with bug bites on his R lateral thigh. Since then he has been more lethargic and has decreased oral intake. Denies noticing fever. Patient denies any pain anywhere. Denies noticing cough, hematochezia, melena, vomiting.     PSH: foot surgery?    Social Hx: Denies x 3    Family Hx: Denies

## 2018-07-23 NOTE — ED PROVIDER NOTE - MEDICAL DECISION MAKING DETAILS
69 y/o male presents himself to the ED with c/o lethargy, hip pain, difficulty ambulating. Hx of Parkinson's, HTN, DM, seizure disorder. Was given abx at urgent care and d/c back to facility. Care taker states he has weakness, difficulty walking, AMS. Bite wounds are healed w/o infection. Has hemotympanum and blood from right ear. Concern for occult trauma. Will obtain labs, CT imaging, symptom control and reassess.

## 2018-07-23 NOTE — ED PROVIDER NOTE - NS_ ATTENDINGSCRIBEDETAILS _ED_A_ED_FT
I, Rudy Garrison MD,  performed the initial face to face bedside interview with this patient regarding history of present illness, review of symptoms and relevant past medical, social and family history.  I completed an independent physical examination.  I was the initial provider who evaluated this patient.  The history, relevant review of systems, past medical and surgical history, medical decision making, and physical examination was documented by the scribe in my presence and I attest to the accuracy of the documentation.

## 2018-07-23 NOTE — ED ADULT TRIAGE NOTE - CHIEF COMPLAINT QUOTE
pt presents to ED due to lethargy pt went to adult day camp on Friday and came home with bites pt was seen in urgent care started on PO abx with no improvement as per EMS adult home states pt more lethargic since no fevers

## 2018-07-23 NOTE — ED ADULT NURSE REASSESSMENT NOTE - NS ED NURSE REASSESS COMMENT FT1
receive dpt care from day RN, pt alert and awake, normal baseline, makes needs known, group home aide at bedside, pt sitting up, comfortable appearance, not in any distress, cooperative, denies ay pain at this time, denies chest pain/sob, LS clear, will continue to monitor.

## 2018-07-23 NOTE — ED PROVIDER NOTE - OBJECTIVE STATEMENT
67 y/o male with PMHx of Parkinson's, HTN, seizures presents to the ED s/p animal bite 3 days ago c/o lethargy starting when he returned from camp. Aide states that he went to outdoor camp came back 3 days ago with the bites on right hip. Aid took him to Urgent care 2 days ago, put him on abx and d/c. However pt is still complaining of pain after the abx. Now +difficulty ambulating +diffuse myalgia, +lethargy, +decreased PO intake. Denies itchiness, rash. Aide also states pt had right ear bleeding when he came back from camp. No hx of ETOH and smoking. 69 y/o male with PMHx of Parkinson's, HTN, seizures presents to the ED s/p insect bite 3 days ago c/o lethargy starting when he returned from camp. Aide states that he went to outdoor camp came back 3 days ago with the bites on right hip. Aid took him to Urgent care 2 days ago, put him on abx and d/c. However pt is still complaining of pain after the abx. Now +difficulty ambulating +diffuse myalgia, +lethargy, +decreased PO intake. Denies itchiness, rash. Aide also states pt had right ear bleeding when he came back from camp. No hx of ETOH and smoking.

## 2018-07-23 NOTE — ED PROVIDER NOTE - PHYSICAL EXAMINATION
Constitutional: mild distress.   Eyes: PERRLA EOMI  Head: Normocephalic atraumatic. Right ear Hemotympanum. No raccoon eyes. No higgins signs.  Mouth: MMM  Cardiac: regular rate   Resp: Lungs CTAB  GI: Abd s/nt/nd  Neuro: CN2-12 intact  Skin: No rashes. 4 scab wounds on right thigh, no crepitus, no warmth, no streaking. Minimal Erythema Constitutional: mild distress.   Eyes: PERRLA EOMI  Head: Normocephalic atraumatic. Right ear Hemotympanum. No raccoon eyes. No higgins signs no csf rhinorrhea.  Mouth: MMM  Cardiac: regular rate   Resp: Lungs CTAB  GI: Abd s/nt/nd  Neuro: CN2-12 intact  Skin: No rashes. 4 scab wounds on right thigh, no crepitus, no warmth, no streaking. Minimal Erythema

## 2018-07-23 NOTE — H&P ADULT - ASSESSMENT
67 y/o M with PMH of parkinson's disease, HTN, seizure disorder, NIDDM, hyponatremia, BPH, osteoporosis, GERD, tinea ppedis, gait abnormality, nontoxic uninodular goiter, acquired cyst of kidney, h/o PNA, p/w lethargy    *Proctitis on CT  -Cipro / flagyl  -IVF  -NPO for now  -Blood cultures  -Stool cultures  -Recent cephalexin prescription on 7/21/18 - as per aide at bedside, no diarrhea has been noted as per aide. WBC is also WNL.     *Encephalomalacia / gliosis of L occipitotemporal lobe w/ associated gyriform coarse calcifciations - remote infarct vs struge fernandez syndrome?  -Neuro consult for further eval    *DM2  -Humalog ISS  -Diabetic diet  -Hold oral meds during hospitalization    *R thigh Excoriations / bug bites?  -Lyme panel  -ID consult  -Outpatient derm referral    *Hyponatremia  -Has a h/o hyponatremia, continue to trend in AM    *Pulmonary nodule noted on CT  -Will need to f/u outpatient with pulm and serial imaging    *Hypodense liver lesions  -Will need to have outpatient abdominal u/s and f/u outpatient with GI     *B/L renal cysts / hypodense lesions  -Will need to have outpatient renal u/s and f/u with nephro for further evaluation    *Bony hemangioma noted on CT  -No neurological symptoms reported at this time  -Outpatient neurosurgery evaluation    *H/o osteoporosis w/ compression deformities  -Vitamin D level  -Outpatient f/u for further evaluation and management w/ PCP     *Degenerative disc disease of spine  -Outpatient  f/u    *DVT ppx  -SCDs 67 y/o M with PMH of parkinson's disease, HTN, seizure disorder, NIDDM, hyponatremia, BPH, osteoporosis, GERD, tinea ppedis, gait abnormality, nontoxic uninodular goiter, acquired cyst of kidney, h/o PNA, p/w lethargy    *Proctitis on CT  -Cipro / flagyl  -IVF  -NPO for now  -Blood cultures  -Stool cultures  -Recent cephalexin prescription on 7/21/18 - as per aide at bedside, no diarrhea has been noted. WBC is also WNL.     *Encephalomalacia / gliosis of L occipitotemporal lobe w/ associated gyriform coarse calcifciations - remote infarct vs struge fernandez syndrome?  -Neuro consult for further eval    *DM2  -Humalog ISS  -Diabetic diet  -Hold oral meds during hospitalization    *R thigh Excoriations / bug bites?  -Lyme panel  -ID consult  -Derm referral if no resolution    *Hyponatremia  -Has a h/o hyponatremia, continue to trend in AM    *Pulmonary nodule noted on CT  -Will need to f/u outpatient with pulm and serial imaging    *Hypodense liver lesions  -Will need to have outpatient abdominal u/s and f/u outpatient with GI     *B/L renal cysts / hypodense lesions  -Will need to have outpatient renal u/s and f/u with nephro for further evaluation    *Bony hemangioma noted on CT  -No neurological symptoms reported at this time  -Outpatient neurosurgery evaluation    *H/o osteoporosis w/ compression deformities  -Vitamin D level  -Outpatient f/u for further evaluation and management w/ PCP     *Degenerative disc disease of spine  -Outpatient  f/u    *DVT ppx  -SCDs 69 y/o M with PMH of parkinson's disease, HTN, seizure disorder, NIDDM, hyponatremia, BPH, osteoporosis, GERD, tinea ppedis, gait abnormality, nontoxic uninodular goiter, acquired cyst of kidney, h/o PNA, p/w lethargy    *Proctitis on CT  -Cipro / flagyl  -IVF  -NPO for now  -Blood cultures  -Stool cultures  -Recent cephalexin prescricription on 7/21/18 - as per aide at bedside, no diarrhea has been noted. WBC is also WNL.     *Encephalomalacia / gliosis of L occipitotemporal lobe w/ associated gyriform coarse calcifciations - remote infarct vs struge fernandez syndrome?  -Neuro consult for further eval    *DM2  -Humalog ISS  -Diabetic diet  -Hold oral meds during hospitalization    *R thigh Excoriations / bug bites?  -Lyme panel  -ID consult  -Derm referral if no resolution    *Hyponatremia  -Has a h/o hyponatremia, continue to trend in AM    *Pulmonary nodule noted on CT  -Will need to f/u outpatient with pulm and serial imaging    *Hypodense liver lesions  -Will need to have outpatient abdominal u/s and f/u outpatient with GI     *B/L renal cysts / hypodense lesions  -Will need to have outpatient renal u/s and f/u with nephro for further evaluation    *Bony hemangioma noted on CT  -No neurological symptoms reported at this time  -Outpatient neurosurgery evaluation    *H/o osteoporosis w/ compression deformities  -Vitamin D level  -Outpatient f/u for further evaluation and management w/ PCP     *Degenerative disc disease of spine  -Outpatient  f/u    *H/o Parkinson's disease / HTN / Seizure disorder / BPH / GERD / goiter   -F/u outpatient for further management    *DVT ppx  -SCDs

## 2018-07-24 DIAGNOSIS — G20 PARKINSON'S DISEASE: ICD-10-CM

## 2018-07-24 DIAGNOSIS — R56.9 UNSPECIFIED CONVULSIONS: ICD-10-CM

## 2018-07-24 LAB
ALBUMIN SERPL ELPH-MCNC: 2.7 G/DL — LOW (ref 3.3–5)
ALP SERPL-CCNC: 46 U/L — SIGNIFICANT CHANGE UP (ref 40–120)
ALT FLD-CCNC: 6 U/L — LOW (ref 12–78)
ANION GAP SERPL CALC-SCNC: 11 MMOL/L — SIGNIFICANT CHANGE UP (ref 5–17)
AST SERPL-CCNC: 8 U/L — LOW (ref 15–37)
B BURGDOR C6 AB SER-ACNC: NEGATIVE — SIGNIFICANT CHANGE UP
B BURGDOR IGG+IGM SER-ACNC: <0.01 INDEX — SIGNIFICANT CHANGE UP (ref 0.01–0.89)
BASOPHILS # BLD AUTO: 0.02 K/UL — SIGNIFICANT CHANGE UP (ref 0–0.2)
BASOPHILS NFR BLD AUTO: 0.4 % — SIGNIFICANT CHANGE UP (ref 0–2)
BILIRUB SERPL-MCNC: 0.4 MG/DL — SIGNIFICANT CHANGE UP (ref 0.2–1.2)
BUN SERPL-MCNC: 12 MG/DL — SIGNIFICANT CHANGE UP (ref 7–23)
CALCIUM SERPL-MCNC: 8.7 MG/DL — SIGNIFICANT CHANGE UP (ref 8.5–10.1)
CHLORIDE SERPL-SCNC: 93 MMOL/L — LOW (ref 96–108)
CHOLEST SERPL-MCNC: 114 MG/DL — SIGNIFICANT CHANGE UP (ref 10–199)
CO2 SERPL-SCNC: 28 MMOL/L — SIGNIFICANT CHANGE UP (ref 22–31)
CREAT SERPL-MCNC: 0.68 MG/DL — SIGNIFICANT CHANGE UP (ref 0.5–1.3)
CULTURE RESULTS: NO GROWTH — SIGNIFICANT CHANGE UP
EOSINOPHIL # BLD AUTO: 0.08 K/UL — SIGNIFICANT CHANGE UP (ref 0–0.5)
EOSINOPHIL NFR BLD AUTO: 1.4 % — SIGNIFICANT CHANGE UP (ref 0–6)
GLUCOSE BLDC GLUCOMTR-MCNC: 126 MG/DL — HIGH (ref 70–99)
GLUCOSE BLDC GLUCOMTR-MCNC: 209 MG/DL — HIGH (ref 70–99)
GLUCOSE BLDC GLUCOMTR-MCNC: 224 MG/DL — HIGH (ref 70–99)
GLUCOSE BLDC GLUCOMTR-MCNC: 235 MG/DL — HIGH (ref 70–99)
GLUCOSE SERPL-MCNC: 158 MG/DL — HIGH (ref 70–99)
HBA1C BLD-MCNC: 6.6 % — HIGH (ref 4–5.6)
HCT VFR BLD CALC: 36.4 % — LOW (ref 39–50)
HDLC SERPL-MCNC: 40 MG/DL — SIGNIFICANT CHANGE UP (ref 40–125)
HGB BLD-MCNC: 12.6 G/DL — LOW (ref 13–17)
IMM GRANULOCYTES NFR BLD AUTO: 0.5 % — SIGNIFICANT CHANGE UP (ref 0–1.5)
INR BLD: 1.11 RATIO — SIGNIFICANT CHANGE UP (ref 0.88–1.16)
LACTATE SERPL-SCNC: 1.2 MMOL/L — SIGNIFICANT CHANGE UP (ref 0.7–2)
LIPID PNL WITH DIRECT LDL SERPL: 57 MG/DL — SIGNIFICANT CHANGE UP
LYME C6 AB IGG/IGM EIA REFLEX WESTERN BL: SIGNIFICANT CHANGE UP
LYMPHOCYTES # BLD AUTO: 1.56 K/UL — SIGNIFICANT CHANGE UP (ref 1–3.3)
LYMPHOCYTES # BLD AUTO: 27.4 % — SIGNIFICANT CHANGE UP (ref 13–44)
MAGNESIUM SERPL-MCNC: 1.6 MG/DL — SIGNIFICANT CHANGE UP (ref 1.6–2.6)
MCHC RBC-ENTMCNC: 31.4 PG — SIGNIFICANT CHANGE UP (ref 27–34)
MCHC RBC-ENTMCNC: 34.6 GM/DL — SIGNIFICANT CHANGE UP (ref 32–36)
MCV RBC AUTO: 90.8 FL — SIGNIFICANT CHANGE UP (ref 80–100)
MONOCYTES # BLD AUTO: 0.75 K/UL — SIGNIFICANT CHANGE UP (ref 0–0.9)
MONOCYTES NFR BLD AUTO: 13.2 % — SIGNIFICANT CHANGE UP (ref 2–14)
NEUTROPHILS # BLD AUTO: 3.26 K/UL — SIGNIFICANT CHANGE UP (ref 1.8–7.4)
NEUTROPHILS NFR BLD AUTO: 57.1 % — SIGNIFICANT CHANGE UP (ref 43–77)
NRBC # BLD: 0 /100 WBCS — SIGNIFICANT CHANGE UP (ref 0–0)
PHOSPHATE SERPL-MCNC: 3.7 MG/DL — SIGNIFICANT CHANGE UP (ref 2.5–4.5)
PLATELET # BLD AUTO: 148 K/UL — LOW (ref 150–400)
POTASSIUM SERPL-MCNC: 4.1 MMOL/L — SIGNIFICANT CHANGE UP (ref 3.5–5.3)
POTASSIUM SERPL-SCNC: 4.1 MMOL/L — SIGNIFICANT CHANGE UP (ref 3.5–5.3)
PROT SERPL-MCNC: 5.8 GM/DL — LOW (ref 6–8.3)
PROTHROM AB SERPL-ACNC: 12 SEC — SIGNIFICANT CHANGE UP (ref 9.8–12.7)
RBC # BLD: 4.01 M/UL — LOW (ref 4.2–5.8)
RBC # FLD: 12.4 % — SIGNIFICANT CHANGE UP (ref 10.3–14.5)
SODIUM SERPL-SCNC: 132 MMOL/L — LOW (ref 135–145)
SPECIMEN SOURCE: SIGNIFICANT CHANGE UP
TOTAL CHOLESTEROL/HDL RATIO MEASUREMENT: 2.9 RATIO — LOW (ref 3.4–9.6)
TRIGL SERPL-MCNC: 87 MG/DL — SIGNIFICANT CHANGE UP (ref 10–149)
VIT D25+D1,25 OH+D1,25 PNL SERPL-MCNC: 11.6 PG/ML — LOW (ref 19.9–79.3)
WBC # BLD: 5.7 K/UL — SIGNIFICANT CHANGE UP (ref 3.8–10.5)
WBC # FLD AUTO: 5.7 K/UL — SIGNIFICANT CHANGE UP (ref 3.8–10.5)

## 2018-07-24 PROCEDURE — 95819 EEG AWAKE AND ASLEEP: CPT | Mod: 26

## 2018-07-24 PROCEDURE — 99222 1ST HOSP IP/OBS MODERATE 55: CPT

## 2018-07-24 RX ORDER — INSULIN LISPRO 100/ML
VIAL (ML) SUBCUTANEOUS AT BEDTIME
Qty: 0 | Refills: 0 | Status: DISCONTINUED | OUTPATIENT
Start: 2018-07-24 | End: 2018-07-26

## 2018-07-24 RX ORDER — INSULIN LISPRO 100/ML
VIAL (ML) SUBCUTANEOUS
Qty: 0 | Refills: 0 | Status: DISCONTINUED | OUTPATIENT
Start: 2018-07-24 | End: 2018-07-26

## 2018-07-24 RX ADMIN — ESCITALOPRAM OXALATE 40 MILLIGRAM(S): 10 TABLET, FILM COATED ORAL at 11:33

## 2018-07-24 RX ADMIN — CARBIDOPA AND LEVODOPA 1 TABLET(S): 25; 100 TABLET ORAL at 11:33

## 2018-07-24 RX ADMIN — Medication 100 MILLIGRAM(S): at 21:37

## 2018-07-24 RX ADMIN — Medication 1 TABLET(S): at 11:33

## 2018-07-24 RX ADMIN — LEVETIRACETAM 750 MILLIGRAM(S): 250 TABLET, FILM COATED ORAL at 05:21

## 2018-07-24 RX ADMIN — TAMSULOSIN HYDROCHLORIDE 0.4 MILLIGRAM(S): 0.4 CAPSULE ORAL at 21:36

## 2018-07-24 RX ADMIN — LORATADINE 10 MILLIGRAM(S): 10 TABLET ORAL at 11:33

## 2018-07-24 RX ADMIN — Medication 100 MILLIGRAM(S): at 05:20

## 2018-07-24 RX ADMIN — QUETIAPINE FUMARATE 50 MILLIGRAM(S): 200 TABLET, FILM COATED ORAL at 11:34

## 2018-07-24 RX ADMIN — Medication 1000 UNIT(S): at 11:33

## 2018-07-24 RX ADMIN — LEVETIRACETAM 750 MILLIGRAM(S): 250 TABLET, FILM COATED ORAL at 17:22

## 2018-07-24 RX ADMIN — Medication 2: at 17:26

## 2018-07-24 RX ADMIN — Medication 100 MILLIGRAM(S): at 17:27

## 2018-07-24 RX ADMIN — Medication 200 MILLIGRAM(S): at 17:20

## 2018-07-24 RX ADMIN — CARBIDOPA AND LEVODOPA 1 TABLET(S): 25; 100 TABLET ORAL at 21:36

## 2018-07-24 RX ADMIN — Medication 2: at 11:56

## 2018-07-24 RX ADMIN — Medication 100 MILLIGRAM(S): at 13:37

## 2018-07-24 RX ADMIN — DIVALPROEX SODIUM 1000 MILLIGRAM(S): 500 TABLET, DELAYED RELEASE ORAL at 09:04

## 2018-07-24 RX ADMIN — Medication 10 MILLIGRAM(S): at 11:34

## 2018-07-24 RX ADMIN — Medication 81 MILLIGRAM(S): at 11:34

## 2018-07-24 RX ADMIN — Medication 200 MILLIGRAM(S): at 06:18

## 2018-07-24 RX ADMIN — ARIPIPRAZOLE 30 MILLIGRAM(S): 15 TABLET ORAL at 21:36

## 2018-07-24 RX ADMIN — SODIUM CHLORIDE 1 GRAM(S): 9 INJECTION INTRAMUSCULAR; INTRAVENOUS; SUBCUTANEOUS at 13:34

## 2018-07-24 RX ADMIN — DIVALPROEX SODIUM 1000 MILLIGRAM(S): 500 TABLET, DELAYED RELEASE ORAL at 17:21

## 2018-07-24 NOTE — CONSULT NOTE ADULT - PROBLEM SELECTOR RECOMMENDATION 9
seizure free for several years as per the Aide. stable on present meds.  EEG.  continue Depakote, Keppra.

## 2018-07-24 NOTE — CONSULT NOTE ADULT - SUBJECTIVE AND OBJECTIVE BOX
67 y/o male with PMHx of Parkinson's, HTN, seizures presents to the ED, lives in adult home, hx of seizures none for more than 2 years as per aide. Noted to be less active, lethargic.   Patient denies headache, dizziness, trouble swallowing, no unilateral weakness or numbness of the extremities. No neck or back pain.  Aide reports recent  s/p insect bite 3 days ago, with noted lethargy. Patient has hx of dementia, poor historian.    PSH: foot surgery?    Social Hx: Denies x 3    Family Hx: Denies (23 Jul 2018 21:12)      PAST MEDICAL & SURGICAL HISTORY:  Hypertension  Seizures  Parkinson disease  H/O foot surgery    FAMILY HISTORY:  No pertinent family history in first degree relatives    Social Hx:  Nonsmoker, no drug or alcohol use  Medications and Allergies ReviewedMEDICATIONS  (STANDING):  ARIPiprazole  Oral Tab/Cap - Peds 30 milliGRAM(s) Oral at bedtime  aspirin  chewable 81 milliGRAM(s) Oral daily  calcium carbonate 1250 mG  + Vitamin D (OsCal 500 + D) 1 Tablet(s) Oral daily  carbidopa/levodopa  25/100 1 Tablet(s) Oral at bedtime  carbidopa/levodopa  25/100 1 Tablet(s) Oral daily  cholecalciferol 1000 Unit(s) Oral daily  ciprofloxacin   IVPB 400 milliGRAM(s) IV Intermittent every 12 hours  diVALproex ER 1000 milliGRAM(s) Oral <User Schedule>  docusate sodium 100 milliGRAM(s) Oral two times a day  escitalopram 40 milliGRAM(s) Oral daily  insulin lispro (HumaLOG) corrective regimen sliding scale   SubCutaneous every 6 hours  ketoconazole 2% Cream 1 Application(s) Topical daily  levETIRAcetam 750 milliGRAM(s) Oral two times a day  loratadine 10 milliGRAM(s) Oral daily  metroNIDAZOLE  IVPB 500 milliGRAM(s) IV Intermittent every 8 hours  oxybutynin 10 milliGRAM(s) Oral daily  QUEtiapine 50 milliGRAM(s) Oral daily  sodium chloride 1 Gram(s) Oral daily  sodium chloride 0.9%. 1000 milliLiter(s) (75 mL/Hr) IV Continuous <Continuous>  tamsulosin 0.4 milliGRAM(s) Oral at bedtime     ROS: Pertinent positives in HPI, all other ROS were reviewed and are negative.      Examination:   Vital Signs Last 24 Hrs  T(C): 36.7 (24 Jul 2018 05:00), Max: 36.9 (23 Jul 2018 15:20)  T(F): 98.1 (24 Jul 2018 05:00), Max: 98.5 (23 Jul 2018 15:20)  HR: 65 (24 Jul 2018 05:00) (63 - 94)  BP: 113/99 (24 Jul 2018 05:00) (102/82 - 121/78)  BP(mean): --  RR: 18 (24 Jul 2018 05:00) (18 - 18)  SpO2: 93% (24 Jul 2018 05:00) (93% - 100%)  General: Cooperative, NAD   NECK: supple, no masses  ENT: Normal hearing   Vascular : no carotid bruits,   Lungs: CTAB  Chest: RRR, no murmurs  Extremities: nontender, no edema  Musculoskeletal: no adventitious movements, no joint stiffness  Skin: no rash    Neurological Examination:  NIHSS:  MS: AOx2. distracted flat affect, follows one step commands.    CN: ? right VFC, PERLL, EOMI, V1-3 sensation intact, face symmetric, hearing intact, palate elevates symmetrically, tongue midline, SCM equal bilaterally  Motor: normal bulk and tone, + kinetic tremor, rigidity or bradykinesia.  3+/5 all over. poor effort  Sens: Intact to light touch.    Reflexes: 1-2/4 all over, downgoing toes b/l  Coord:  limited, = bilat dysmetria, slow LIZETH    Gait: Cannot test    Labs: Reviewed  Imaging:       < from: CT Head No Cont (07.23.18 @ 11:43) >  IMPRESSION:    CT HEAD:  < from: CT Head No Cont (07.23.18 @ 11:43) >  There is redemonstration of encephalomalacia/gliosis of the left   occipitotemporal lobe with associated gyriform coarse calcifications   which may be sequela of remote infarct or Struge-Appiah syndrome in the   correct clinical setting.    < end of copied text >    No acute intracranial pathology or hemorrhage. No acute calvarial   fracture. No interval change since head CT of 7/1/17.    CT CERVICAL SPINE:  No acute fracture or subluxation. No interval change since cervical spine   CT of 7/1/17.    Mild-moderate multilevel degenerative disease    < end of copied text >

## 2018-07-24 NOTE — CONSULT NOTE ADULT - ASSESSMENT
68 year old man hx of parkinson dx, dementia. admitted for lethargy, gen weakness. exam limited by underlying dementia, grossly non focal. CT shows old left occipital calcifications. no cutaneous manifestation for Sturge Appiah disease. ? AVM, prior CVA.

## 2018-07-24 NOTE — CONSULT NOTE ADULT - ASSESSMENT
67 y/o M with PMH of parkinson's disease, HTN, seizure disorder, NIDDM, hyponatremia, BPH, osteoporosis, GERD, tinea pedis, gait abnormality, nontoxic uninodular goiter, acquired cyst of kidney, h/o PNA,, lives in group home admitted on 7/23 for evaluation of increased lethargy after returning from camp at [employer]; he was noted to have bug bites on right hip that were itchy and he was scratching them and yellow drainage was noted; he was treated with keflex with good improvement however he continues to not walk. History per medical record and aide at the bedside.   1. Patient admitted with bug bites on right thigh  - will rule out Lyme via serology  - no evidence of infection or cellulitis at the right hip  2. Severe proctitis  - follow up cultures   - iv hydration and supportive care   - reviewed prior medical records to evaluate for resistant or atypical pathogens   - will continue cipro and flagyl as ordered  3. other issues: parkinson's disease, HTN, seizure disorder, NIDDM, hyponatremia, BPH, osteoporosis, GERD, tinea pedis, gait abnormality, nontoxic uninodular goiter, acquired cyst of kidney  - per medicine

## 2018-07-24 NOTE — CONSULT NOTE ADULT - SUBJECTIVE AND OBJECTIVE BOX
Patient is a 68y old  Male who presents with a chief complaint of lethargy (2018 21:12)    HPI:  67 y/o M with PMH of parkinson's disease, HTN, seizure disorder, NIDDM, hyponatremia, BPH, osteoporosis, GERD, tinea pedis, gait abnormality, nontoxic uninodular goiter, acquired cyst of kidney, h/o PNA,, lives in group home admitted on  for evaluation of increased lethargy after returning from camp at [employer]; he was noted to have bug bites on right hip that were itchy and he was scratching them and yellow drainage was noted; he was treated with keflex with good improvement however he continues to not walk. History per medical record and aide at the bedside.       PMH: as above  PSH: as above  Meds: per reconciliation sheet, noted below  MEDICATIONS  (STANDING):  ARIPiprazole  Oral Tab/Cap - Peds 30 milliGRAM(s) Oral at bedtime  aspirin  chewable 81 milliGRAM(s) Oral daily  calcium carbonate 1250 mG  + Vitamin D (OsCal 500 + D) 1 Tablet(s) Oral daily  carbidopa/levodopa  25/100 1 Tablet(s) Oral at bedtime  carbidopa/levodopa  25/100 1 Tablet(s) Oral daily  cholecalciferol 1000 Unit(s) Oral daily  ciprofloxacin   IVPB 400 milliGRAM(s) IV Intermittent every 12 hours  dextrose 5%. 1000 milliLiter(s) (50 mL/Hr) IV Continuous <Continuous>  dextrose 50% Injectable 12.5 Gram(s) IV Push once  dextrose 50% Injectable 25 Gram(s) IV Push once  dextrose 50% Injectable 25 Gram(s) IV Push once  diVALproex ER 1000 milliGRAM(s) Oral <User Schedule>  docusate sodium 100 milliGRAM(s) Oral two times a day  escitalopram 40 milliGRAM(s) Oral daily  insulin lispro (HumaLOG) corrective regimen sliding scale   SubCutaneous every 6 hours  levETIRAcetam 750 milliGRAM(s) Oral two times a day  loratadine 10 milliGRAM(s) Oral daily  metroNIDAZOLE  IVPB 500 milliGRAM(s) IV Intermittent every 8 hours  oxybutynin 10 milliGRAM(s) Oral daily  QUEtiapine 50 milliGRAM(s) Oral daily  sodium chloride 1 Gram(s) Oral daily  sodium chloride 0.9%. 1000 milliLiter(s) (75 mL/Hr) IV Continuous <Continuous>  tamsulosin 0.4 milliGRAM(s) Oral at bedtime    MEDICATIONS  (PRN):  acetaminophen   Tablet 650 milliGRAM(s) Oral every 6 hours PRN for moderate pain  dextrose 40% Gel 15 Gram(s) Oral once PRN Blood Glucose LESS THAN 70 milliGRAM(s)/deciliter  glucagon  Injectable 1 milliGRAM(s) IntraMuscular once PRN Glucose LESS THAN 70 milligrams/deciliter    Allergies    No Known Allergies    Intolerances      Social: no smoking, no alcohol, no illegal drugs; no recent travel, no exposure to TB  FAMILY HISTORY:  No pertinent family history in first degree relatives     no history of premature cardiovascular disease in first degree relatives  ROS: unable to obtain secondary to patient medical condition     Vital Signs Last 24 Hrs  T(C): 36.9 (2018 10:55), Max: 36.9 (2018 15:20)  T(F): 98.5 (2018 10:55), Max: 98.5 (2018 15:20)  HR: 81 (2018 10:55) (63 - 81)  BP: 109/69 (2018 10:55) (102/82 - 121/78)  BP(mean): --  RR: 18 (2018 10:55) (18 - 18)  SpO2: 94% (2018 10:55) (93% - 100%)  Daily     Daily     PE:    Constitutional: frail looking  HEENT: NC/AT, EOMI, PERRLA, conjunctivae clear; ears and nose atraumatic; pharynx clear  Neck: supple; thyroid not palpable  Back: no tenderness  Respiratory: respiratory effort normal; clear to auscultation  Cardiovascular: S1S2 regular, no murmurs  Abdomen: soft, not tender, not distended, positive BS; no liver or spleen organomegaly  Genitourinary: no suprapubic tenderness  Musculoskeletal: no muscle tenderness, no joint swelling or tenderness  Neurological/ Psychiatric:    moving all extremities  Skin: dried papules on right hip    Labs: all available labs reviewed                        12.6   5.70  )-----------( 148      ( 2018 07:07 )             36.4     07-24    132<L>  |  93<L>  |  12  ----------------------------<  158<H>  4.1   |  28  |  0.68    Ca    8.7      2018 07:07  Phos  3.7       Mg     1.6         TPro  5.8<L>  /  Alb  2.7<L>  /  TBili  0.4  /  DBili  x   /  AST  8<L>  /  ALT  6<L>  /  AlkPhos  46       LIVER FUNCTIONS - ( 2018 07:07 )  Alb: 2.7 g/dL / Pro: 5.8 gm/dL / ALK PHOS: 46 U/L / ALT: 6 U/L / AST: 8 U/L / GGT: x           Urinalysis Basic - ( 2018 13:56 )    Color: Yellow / Appearance: Clear / S.015 / pH: x  Gluc: x / Ketone: Small  / Bili: Negative / Urobili: Negative mg/dL   Blood: x / Protein: Negative mg/dL / Nitrite: Negative   Leuk Esterase: Negative / RBC: 3-5 /HPF / WBC Negative   Sq Epi: x / Non Sq Epi: Occasional / Bacteria: x    < from: CT Abdomen and Pelvis w/ IV Cont (18 @ 14:24) >    EXAM:  CT ABDOMEN AND PELVIS IC                          EXAM:  CT CHEST IC                            PROCEDURE DATE:  2018          INTERPRETATION:  Clinical information:   Difficulty ambulating, diffuse   myalgia, lethargy, decreased p.o. intake.      COMPARISON: None    PROCEDURE:   CT of the Chest, Abdomen and Pelvis was performed with intravenous   contrast.   Imaging was performed through the chest in the arterial phase followed by   imaging of the abdomen and pelvis in the portal venous phase.  Intravenous contrast: 90 ml Omnipaque 350. 10 ml discarded.  Oral contrast:None.  Sagittal and coronal reformats were performed.    FINDINGS:    CHEST:     LOWER NECK: Within normal limits.  AXILLA, MEDIASTINUM AND DAVID: No lymphadenopathy or mediastinal   hemorrhage.  VESSELS: Normal caliber aorta, without evidence of pseudoaneurysm.  HEART: The heart is normal in size.No pericardial effusion.  PLEURA: No pleural effusion or pneumothorax.  LUNGS AND LARGE AIRWAYS: Patent central airways. Stable benign-appearing   4 mm right middle lobe pulmonary nodule. No suspicious pulmonary nodule,   mass or consolidation.  CHEST WALL:  Unremarkable    ABDOMEN AND PELVIS:    LIVER: Stable scattered subcentimeter hypodense lesions, too small to   characterize.  SPLEEN: Within normal limits.  PANCREAS: Within normal limits.  GALLBLADDER: Within normal limits.  BILE DUCTS: Normal caliber.  ADRENALS: Within normal limits.  KIDNEYS/URETERS: No mass, stone or hydronephrosis. Bilateral renal cysts   and subcentimeter hypodense lesions which are too small to characterize.    RETROPERITONEUM: No lymphadenopathy or hemorrhage.    VESSELS:  Within normal limits.      BOWEL: Severe wall thickening of the rectum. No bowel obstruction,   pneumatosis. Appendix normal.  PERITONEUM: No ascites or pneumoperitoneum.    REPRODUCTIVE ORGANS: The prostate is within normal limits.  BLADDER: Within normal limits    ABDOMINAL WALL: Within normal limits.  BONES: Numerous old bilateral healed rib fracturesold sternal fractures.   Multiple thoracolumbar compression deformities. No definite acute   fracture.    IMPRESSION: No traumatic injury. Severe proctitis.    < end of copied text >        Radiology: all available radiological tests reviewed    Advanced directives addressed: full resuscitation

## 2018-07-24 NOTE — PROGRESS NOTE ADULT - SUBJECTIVE AND OBJECTIVE BOX
CHIEF COMPLAINT/diagnosis: proctitis    SUBJECTIVE: no complaints    REVIEW OF SYSTEMS:    CONSTITUTIONAL: No weakness, fevers or chills  EYES/ENT: No visual changes;  No vertigo or throat pain   NECK: No pain or stiffness  RESPIRATORY: No cough, wheezing, hemoptysis; No shortness of breath  CARDIOVASCULAR: No chest pain or palpitations  GASTROINTESTINAL: No abdominal or epigastric pain. No nausea, vomiting, or hematemesis; No diarrhea or constipation. No melena or hematochezia.  GENITOURINARY: No dysuria, frequency or hematuria  NEUROLOGICAL: No numbness or weakness  SKIN: No itching, burning, rashes, or lesions   All other review of systems is negative unless indicated above    Vital Signs Last 24 Hrs  T(C): 36.7 (24 Jul 2018 05:00), Max: 36.9 (23 Jul 2018 15:20)  T(F): 98.1 (24 Jul 2018 05:00), Max: 98.5 (23 Jul 2018 15:20)  HR: 65 (24 Jul 2018 05:00) (63 - 94)  BP: 113/99 (24 Jul 2018 05:00) (102/82 - 121/78)  BP(mean): --  RR: 18 (24 Jul 2018 05:00) (18 - 18)  SpO2: 93% (24 Jul 2018 05:00) (93% - 100%)    I&O's Summary      CAPILLARY BLOOD GLUCOSE      POCT Blood Glucose.: 126 mg/dL (24 Jul 2018 05:24)  POCT Blood Glucose.: 132 mg/dL (23 Jul 2018 22:43)      PHYSICAL EXAM:    Constitutional: NAD, awake and alert, well-developed  HEENT: PERR, EOMI, Normal Hearing, MMM  Neck: Soft and supple, No LAD, No JVD  Respiratory: Breath sounds are clear bilaterally, No wheezing, rales or rhonchi  Cardiovascular: S1 and S2, regular rate and rhythm, no Murmurs, gallops or rubs  Gastrointestinal: Bowel Sounds present, soft, nontender, nondistended, no guarding, no rebound  Extremities: No peripheral edema  Vascular: 2+ peripheral pulses  Neurological: A/O x 3, no focal deficits  Musculoskeletal: 5/5 strength b/l upper and lower extremities  Skin: No rashes    MEDICATIONS:  MEDICATIONS  (STANDING):  ARIPiprazole  Oral Tab/Cap - Peds 30 milliGRAM(s) Oral at bedtime  aspirin  chewable 81 milliGRAM(s) Oral daily  calcium carbonate 1250 mG  + Vitamin D (OsCal 500 + D) 1 Tablet(s) Oral daily  carbidopa/levodopa  25/100 1 Tablet(s) Oral at bedtime  carbidopa/levodopa  25/100 1 Tablet(s) Oral daily  cholecalciferol 1000 Unit(s) Oral daily  ciprofloxacin   IVPB 400 milliGRAM(s) IV Intermittent every 12 hours  dextrose 5%. 1000 milliLiter(s) (50 mL/Hr) IV Continuous <Continuous>  dextrose 50% Injectable 12.5 Gram(s) IV Push once  dextrose 50% Injectable 25 Gram(s) IV Push once  dextrose 50% Injectable 25 Gram(s) IV Push once  diVALproex ER 1000 milliGRAM(s) Oral <User Schedule>  docusate sodium 100 milliGRAM(s) Oral two times a day  escitalopram 40 milliGRAM(s) Oral daily  insulin lispro (HumaLOG) corrective regimen sliding scale   SubCutaneous every 6 hours  ketoconazole 2% Cream 1 Application(s) Topical daily  levETIRAcetam 750 milliGRAM(s) Oral two times a day  loratadine 10 milliGRAM(s) Oral daily  metroNIDAZOLE  IVPB 500 milliGRAM(s) IV Intermittent every 8 hours  oxybutynin 10 milliGRAM(s) Oral daily  QUEtiapine 50 milliGRAM(s) Oral daily  sodium chloride 1 Gram(s) Oral daily  sodium chloride 0.9%. 1000 milliLiter(s) (75 mL/Hr) IV Continuous <Continuous>  tamsulosin 0.4 milliGRAM(s) Oral at bedtime      LABS: All Labs Reviewed:                        12.6   5.70  )-----------( 148      ( 24 Jul 2018 07:07 )             36.4     07-24    132<L>  |  93<L>  |  12  ----------------------------<  158<H>  4.1   |  28  |  0.68    Ca    8.7      24 Jul 2018 07:07  Phos  3.7     07-24  Mg     1.6     07-24    TPro  5.8<L>  /  Alb  2.7<L>  /  TBili  0.4  /  DBili  x   /  AST  8<L>  /  ALT  6<L>  /  AlkPhos  46  07-24    PT/INR - ( 24 Jul 2018 07:07 )   PT: 12.0 sec;   INR: 1.11 ratio         PTT - ( 23 Jul 2018 12:53 )  PTT:36.9 sec  CARDIAC MARKERS ( 23 Jul 2018 12:53 )  <0.015 ng/mL / x     / x     / x     / x          Assessment and Plan  	  67 y/o M with PMH of parkinson's disease, HTN, seizure disorder, NIDDM, hyponatremia, BPH, osteoporosis, GERD, tinea ppedis, gait abnormality, nontoxic uninodular goiter, acquired cyst of kidney, h/o PNA, p/w lethargy    *Proctitis on CT  - c/w Cipro / flagyl  -IVF  -NPO for now  -Blood cultures  -Stool cultures  -Recent cephalexin prescricription on 7/21/18 - as per aide at bedside, no diarrhea has been noted. WBC is also WNL.     *Encephalomalacia / gliosis of L occipitotemporal lobe w/ associated gyriform coarse calcifciations - remote infarct vs struge fernandez syndrome?  -Neuro consult for further eval    *DM2  -Humalog ISS  -Diabetic diet  -Hold oral meds during hospitalization    *R thigh Excoriations / bug bites?  -Lyme panel  -ID consult  -Derm referral if no resolution    *Hyponatremia  -Has a h/o hyponatremia, continue to trend in AM    *Pulmonary nodule noted on CT  -Will need to f/u outpatient with pulm and serial imaging    *Hypodense liver lesions  -Will need to have outpatient abdominal u/s and f/u outpatient with GI     *B/L renal cysts / hypodense lesions  -Will need to have outpatient renal u/s and f/u with nephro for further evaluation    *Bony hemangioma noted on CT  -No neurological symptoms reported at this time  -Outpatient neurosurgery evaluation    *H/o osteoporosis w/ compression deformities  -Vitamin D level  -Outpatient f/u for further evaluation and management w/ PCP     *Degenerative disc disease of spine  -Outpatient  f/u    *H/o Parkinson's disease / HTN / Seizure disorder / BPH / GERD / goiter   -F/u outpatient for further management    *DVT ppx  -SCDs

## 2018-07-25 LAB
ANION GAP SERPL CALC-SCNC: 4 MMOL/L — LOW (ref 5–17)
BUN SERPL-MCNC: 14 MG/DL — SIGNIFICANT CHANGE UP (ref 7–23)
CALCIUM SERPL-MCNC: 8.3 MG/DL — LOW (ref 8.5–10.1)
CHLORIDE SERPL-SCNC: 99 MMOL/L — SIGNIFICANT CHANGE UP (ref 96–108)
CO2 SERPL-SCNC: 30 MMOL/L — SIGNIFICANT CHANGE UP (ref 22–31)
CREAT SERPL-MCNC: 0.62 MG/DL — SIGNIFICANT CHANGE UP (ref 0.5–1.3)
GLUCOSE BLDC GLUCOMTR-MCNC: 188 MG/DL — HIGH (ref 70–99)
GLUCOSE BLDC GLUCOMTR-MCNC: 189 MG/DL — HIGH (ref 70–99)
GLUCOSE BLDC GLUCOMTR-MCNC: 234 MG/DL — HIGH (ref 70–99)
GLUCOSE BLDC GLUCOMTR-MCNC: 286 MG/DL — HIGH (ref 70–99)
GLUCOSE SERPL-MCNC: 206 MG/DL — HIGH (ref 70–99)
POTASSIUM SERPL-MCNC: 4.3 MMOL/L — SIGNIFICANT CHANGE UP (ref 3.5–5.3)
POTASSIUM SERPL-SCNC: 4.3 MMOL/L — SIGNIFICANT CHANGE UP (ref 3.5–5.3)
SODIUM SERPL-SCNC: 133 MMOL/L — LOW (ref 135–145)

## 2018-07-25 RX ADMIN — LEVETIRACETAM 750 MILLIGRAM(S): 250 TABLET, FILM COATED ORAL at 17:17

## 2018-07-25 RX ADMIN — DIVALPROEX SODIUM 1000 MILLIGRAM(S): 500 TABLET, DELAYED RELEASE ORAL at 17:17

## 2018-07-25 RX ADMIN — Medication 1: at 21:47

## 2018-07-25 RX ADMIN — Medication 100 MILLIGRAM(S): at 05:23

## 2018-07-25 RX ADMIN — Medication 10 MILLIGRAM(S): at 11:32

## 2018-07-25 RX ADMIN — Medication 1 TABLET(S): at 11:33

## 2018-07-25 RX ADMIN — Medication 1: at 12:00

## 2018-07-25 RX ADMIN — Medication 81 MILLIGRAM(S): at 11:32

## 2018-07-25 RX ADMIN — Medication 2: at 17:18

## 2018-07-25 RX ADMIN — Medication 200 MILLIGRAM(S): at 06:23

## 2018-07-25 RX ADMIN — CARBIDOPA AND LEVODOPA 1 TABLET(S): 25; 100 TABLET ORAL at 11:33

## 2018-07-25 RX ADMIN — LORATADINE 10 MILLIGRAM(S): 10 TABLET ORAL at 11:32

## 2018-07-25 RX ADMIN — DIVALPROEX SODIUM 1000 MILLIGRAM(S): 500 TABLET, DELAYED RELEASE ORAL at 09:54

## 2018-07-25 RX ADMIN — SODIUM CHLORIDE 1 GRAM(S): 9 INJECTION INTRAMUSCULAR; INTRAVENOUS; SUBCUTANEOUS at 11:33

## 2018-07-25 RX ADMIN — CARBIDOPA AND LEVODOPA 1 TABLET(S): 25; 100 TABLET ORAL at 21:40

## 2018-07-25 RX ADMIN — Medication 100 MILLIGRAM(S): at 17:17

## 2018-07-25 RX ADMIN — ARIPIPRAZOLE 30 MILLIGRAM(S): 15 TABLET ORAL at 21:40

## 2018-07-25 RX ADMIN — Medication 1000 UNIT(S): at 11:33

## 2018-07-25 RX ADMIN — Medication 200 MILLIGRAM(S): at 17:17

## 2018-07-25 RX ADMIN — TAMSULOSIN HYDROCHLORIDE 0.4 MILLIGRAM(S): 0.4 CAPSULE ORAL at 21:40

## 2018-07-25 RX ADMIN — Medication 100 MILLIGRAM(S): at 14:19

## 2018-07-25 RX ADMIN — ESCITALOPRAM OXALATE 40 MILLIGRAM(S): 10 TABLET, FILM COATED ORAL at 11:32

## 2018-07-25 RX ADMIN — Medication 1: at 08:03

## 2018-07-25 RX ADMIN — LEVETIRACETAM 750 MILLIGRAM(S): 250 TABLET, FILM COATED ORAL at 05:23

## 2018-07-25 RX ADMIN — QUETIAPINE FUMARATE 50 MILLIGRAM(S): 200 TABLET, FILM COATED ORAL at 11:32

## 2018-07-25 RX ADMIN — Medication 100 MILLIGRAM(S): at 21:40

## 2018-07-25 NOTE — PROGRESS NOTE ADULT - SUBJECTIVE AND OBJECTIVE BOX
Patient is a 68y old  Male who presents with a chief complaint of lethargy (23 Jul 2018 21:12)      Date of service: 07-25-18 @ 13:47      Patient comfortable  Afebrile      ROS unable to obtain secondary to patient medical condition     MEDICATIONS  (STANDING):  ARIPiprazole  Oral Tab/Cap - Peds 30 milliGRAM(s) Oral at bedtime  aspirin  chewable 81 milliGRAM(s) Oral daily  calcium carbonate 1250 mG  + Vitamin D (OsCal 500 + D) 1 Tablet(s) Oral daily  carbidopa/levodopa  25/100 1 Tablet(s) Oral at bedtime  carbidopa/levodopa  25/100 1 Tablet(s) Oral daily  cholecalciferol 1000 Unit(s) Oral daily  ciprofloxacin   IVPB 400 milliGRAM(s) IV Intermittent every 12 hours  dextrose 5%. 1000 milliLiter(s) (50 mL/Hr) IV Continuous <Continuous>  dextrose 50% Injectable 12.5 Gram(s) IV Push once  dextrose 50% Injectable 25 Gram(s) IV Push once  dextrose 50% Injectable 25 Gram(s) IV Push once  diVALproex ER 1000 milliGRAM(s) Oral <User Schedule>  docusate sodium 100 milliGRAM(s) Oral two times a day  escitalopram 40 milliGRAM(s) Oral daily  insulin lispro (HumaLOG) corrective regimen sliding scale   SubCutaneous three times a day before meals  insulin lispro (HumaLOG) corrective regimen sliding scale   SubCutaneous at bedtime  levETIRAcetam 750 milliGRAM(s) Oral two times a day  loratadine 10 milliGRAM(s) Oral daily  metroNIDAZOLE  IVPB 500 milliGRAM(s) IV Intermittent every 8 hours  oxybutynin 10 milliGRAM(s) Oral daily  QUEtiapine 50 milliGRAM(s) Oral daily  sodium chloride 1 Gram(s) Oral daily  sodium chloride 0.9%. 1000 milliLiter(s) (75 mL/Hr) IV Continuous <Continuous>  tamsulosin 0.4 milliGRAM(s) Oral at bedtime    MEDICATIONS  (PRN):  acetaminophen   Tablet 650 milliGRAM(s) Oral every 6 hours PRN for moderate pain  dextrose 40% Gel 15 Gram(s) Oral once PRN Blood Glucose LESS THAN 70 milliGRAM(s)/deciliter  glucagon  Injectable 1 milliGRAM(s) IntraMuscular once PRN Glucose LESS THAN 70 milligrams/deciliter      Vital Signs Last 24 Hrs  T(C): 36.8 (25 Jul 2018 11:19), Max: 36.8 (25 Jul 2018 11:19)  T(F): 98.3 (25 Jul 2018 11:19), Max: 98.3 (25 Jul 2018 11:19)  HR: 84 (25 Jul 2018 11:19) (69 - 92)  BP: 93/72 (25 Jul 2018 11:19) (93/72 - 113/66)  BP(mean): --  RR: 18 (25 Jul 2018 11:19) (18 - 18)  SpO2: 96% (25 Jul 2018 11:19) (94% - 98%)    Physical Exam:      PE:    Constitutional: frail looking  HEENT: NC/AT, EOMI, PERRLA, conjunctivae clear; ears and nose atraumatic; pharynx clear  Neck: supple; thyroid not palpable  Back: no tenderness  Respiratory: respiratory effort normal; clear to auscultation  Cardiovascular: S1S2 regular, no murmurs  Abdomen: soft, not tender, not distended, positive BS; no liver or spleen organomegaly  Genitourinary: no suprapubic tenderness  Musculoskeletal: no muscle tenderness, no joint swelling or tenderness  Neurological/ Psychiatric:    moving all extremities  Skin: dried papules on right hip    Labs: all available labs reviewed                         Labs:                        12.6   5.70  )-----------( 148      ( 24 Jul 2018 07:07 )             36.4     07-25    133<L>  |  99  |  14  ----------------------------<  206<H>  4.3   |  30  |  0.62    Ca    8.3<L>      25 Jul 2018 07:49  Phos  3.7     07-24  Mg     1.6     07-24    TPro  5.8<L>  /  Alb  2.7<L>  /  TBili  0.4  /  DBili  x   /  AST  8<L>  /  ALT  6<L>  /  AlkPhos  46  07-24           Cultures:       Culture - Blood (collected 07-24-18 @ 07:07)  Source: .Blood None  Preliminary Report (07-25-18 @ 12:01):    No growth to date.    Culture - Blood (collected 07-23-18 @ 22:12)  Source: .Blood None  Preliminary Report (07-25-18 @ 09:01):    No growth to date.    Culture - Urine (collected 07-23-18 @ 13:56)  Source: .Urine None  Final Report (07-24-18 @ 22:32):    No growth    Borrelia burgdorferi IgG/IgM Antibodies (07.23.18 @ 22:12)    LYME IgG/IgM Antibodies Result: <0.01 Index    Lyme C6 Interpretation: Negative: METHOD: KENDRA      Reference Range: (values expressed as Lyme Index )                                < 0.90        Negative                                0.90 - 1.09   Equivocal                                >= 1.10        Positive      CDC/ASTPHLDGuidelines recommend that all samples judged equivocal or      positive be re-tested by immunoblot for confirmation of results.          < from: CT Abdomen and Pelvis w/ IV Cont (07.23.18 @ 14:24) >    EXAM:  CT ABDOMEN AND PELVIS IC                          EXAM:  CT CHEST IC                            PROCEDURE DATE:  07/23/2018          INTERPRETATION:  Clinical information:   Difficulty ambulating, diffuse   myalgia, lethargy, decreased p.o. intake.      COMPARISON: None    PROCEDURE:   CT of the Chest, Abdomen and Pelvis was performed with intravenous   contrast.   Imaging was performed through the chest in the arterial phase followed by   imaging of the abdomen and pelvis in the portal venous phase.  Intravenous contrast: 90 ml Omnipaque 350. 10 ml discarded.  Oral contrast:None.  Sagittal and coronal reformats were performed.    FINDINGS:    CHEST:     LOWER NECK: Within normal limits.  AXILLA, MEDIASTINUM AND DAVID: No lymphadenopathy or mediastinal   hemorrhage.  VESSELS: Normal caliber aorta, without evidence of pseudoaneurysm.  HEART: The heart is normal in size.No pericardial effusion.  PLEURA: No pleural effusion or pneumothorax.  LUNGS AND LARGE AIRWAYS: Patent central airways. Stable benign-appearing   4 mm right middle lobe pulmonary nodule. No suspicious pulmonary nodule,   mass or consolidation.  CHEST WALL:  Unremarkable    ABDOMEN AND PELVIS:    LIVER: Stable scattered subcentimeter hypodense lesions, too small to   characterize.  SPLEEN: Within normal limits.  PANCREAS: Within normal limits.  GALLBLADDER: Within normal limits.  BILE DUCTS: Normal caliber.  ADRENALS: Within normal limits.  KIDNEYS/URETERS: No mass, stone or hydronephrosis. Bilateral renal cysts   and subcentimeter hypodense lesions which are too small to characterize.    RETROPERITONEUM: No lymphadenopathy or hemorrhage.    VESSELS:  Within normal limits.      BOWEL: Severe wall thickening of the rectum. No bowel obstruction,   pneumatosis. Appendix normal.  PERITONEUM: No ascites or pneumoperitoneum.    REPRODUCTIVE ORGANS: The prostate is within normal limits.  BLADDER: Within normal limits    ABDOMINAL WALL: Within normal limits.  BONES: Numerous old bilateral healed rib fracturesold sternal fractures.   Multiple thoracolumbar compression deformities. No definite acute   fracture.    IMPRESSION: No traumatic injury. Severe proctitis.    < end of copied text >        Radiology: all available radiological tests reviewed    Advanced directives addressed: full resuscitation

## 2018-07-25 NOTE — PROGRESS NOTE ADULT - SUBJECTIVE AND OBJECTIVE BOX
CHIEF COMPLAINT/Diagnosis: proctitis    SUBJECTIVE: no complaints    REVIEW OF SYSTEMS:    CONSTITUTIONAL: No weakness, fevers or chills  EYES/ENT: No visual changes;  No vertigo or throat pain   NECK: No pain or stiffness  RESPIRATORY: No cough, wheezing, hemoptysis; No shortness of breath  CARDIOVASCULAR: No chest pain or palpitations  GASTROINTESTINAL: No abdominal or epigastric pain. No nausea, vomiting, or hematemesis; No diarrhea or constipation. No melena or hematochezia.  GENITOURINARY: No dysuria, frequency or hematuria  NEUROLOGICAL: No numbness or weakness  SKIN: No itching, burning, rashes, or lesions   All other review of systems is negative unless indicated above    Vital Signs Last 24 Hrs  T(C): 36.8 (25 Jul 2018 11:19), Max: 36.8 (25 Jul 2018 11:19)  T(F): 98.3 (25 Jul 2018 11:19), Max: 98.3 (25 Jul 2018 11:19)  HR: 84 (25 Jul 2018 11:19) (69 - 92)  BP: 93/72 (25 Jul 2018 11:19) (93/72 - 113/66)  BP(mean): --  RR: 18 (25 Jul 2018 11:19) (18 - 18)  SpO2: 96% (25 Jul 2018 11:19) (94% - 98%)    I&O's Summary      CAPILLARY BLOOD GLUCOSE      POCT Blood Glucose.: 188 mg/dL (25 Jul 2018 11:51)  POCT Blood Glucose.: 189 mg/dL (25 Jul 2018 07:46)  POCT Blood Glucose.: 235 mg/dL (24 Jul 2018 21:17)  POCT Blood Glucose.: 224 mg/dL (24 Jul 2018 17:14)      PHYSICAL EXAM:    Constitutional: NAD, awake and alert, well-developed  HEENT: PERR, EOMI, Normal Hearing, MMM  Neck: Soft and supple, No LAD, No JVD  Respiratory: Breath sounds are clear bilaterally, No wheezing, rales or rhonchi  Cardiovascular: S1 and S2, regular rate and rhythm, no Murmurs, gallops or rubs  Gastrointestinal: Bowel Sounds present, soft, nontender, nondistended, no guarding, no rebound  Extremities: No peripheral edema  Vascular: 2+ peripheral pulses  Neurological: Alert ; oriented to person, no focal deficits  Musculoskeletal: 5/5 strength b/l upper and lower extremities  Skin: No rashes    MEDICATIONS:  MEDICATIONS  (STANDING):  ARIPiprazole  Oral Tab/Cap - Peds 30 milliGRAM(s) Oral at bedtime  aspirin  chewable 81 milliGRAM(s) Oral daily  calcium carbonate 1250 mG  + Vitamin D (OsCal 500 + D) 1 Tablet(s) Oral daily  carbidopa/levodopa  25/100 1 Tablet(s) Oral at bedtime  carbidopa/levodopa  25/100 1 Tablet(s) Oral daily  cholecalciferol 1000 Unit(s) Oral daily  ciprofloxacin   IVPB 400 milliGRAM(s) IV Intermittent every 12 hours  dextrose 5%. 1000 milliLiter(s) (50 mL/Hr) IV Continuous <Continuous>  dextrose 50% Injectable 12.5 Gram(s) IV Push once  dextrose 50% Injectable 25 Gram(s) IV Push once  dextrose 50% Injectable 25 Gram(s) IV Push once  diVALproex ER 1000 milliGRAM(s) Oral <User Schedule>  docusate sodium 100 milliGRAM(s) Oral two times a day  escitalopram 40 milliGRAM(s) Oral daily  insulin lispro (HumaLOG) corrective regimen sliding scale   SubCutaneous three times a day before meals  insulin lispro (HumaLOG) corrective regimen sliding scale   SubCutaneous at bedtime  levETIRAcetam 750 milliGRAM(s) Oral two times a day  loratadine 10 milliGRAM(s) Oral daily  metroNIDAZOLE  IVPB 500 milliGRAM(s) IV Intermittent every 8 hours  oxybutynin 10 milliGRAM(s) Oral daily  QUEtiapine 50 milliGRAM(s) Oral daily  sodium chloride 1 Gram(s) Oral daily  sodium chloride 0.9%. 1000 milliLiter(s) (75 mL/Hr) IV Continuous <Continuous>  tamsulosin 0.4 milliGRAM(s) Oral at bedtime      LABS: All Labs Reviewed:                        12.6   5.70  )-----------( 148      ( 24 Jul 2018 07:07 )             36.4     07-25    133<L>  |  99  |  14  ----------------------------<  206<H>  4.3   |  30  |  0.62    Ca    8.3<L>      25 Jul 2018 07:49  Phos  3.7     07-24  Mg     1.6     07-24    TPro  5.8<L>  /  Alb  2.7<L>  /  TBili  0.4  /  DBili  x   /  AST  8<L>  /  ALT  6<L>  /  AlkPhos  46  07-24    PT/INR - ( 24 Jul 2018 07:07 )   PT: 12.0 sec;   INR: 1.11 ratio               Blood Culture: 07-24 @ 07:07  Organism --  Gram Stain Blood -- Gram Stain --  Specimen Source .Blood None  Culture-Blood --    07-23 @ 22:12  Organism --  Gram Stain Blood -- Gram Stain --  Specimen Source .Blood None  Culture-Blood --    07-23 @ 13:56  Organism --  Gram Stain Blood -- Gram Stain --  Specimen Source .Urine None  Culture-Blood --        Assessment and Plan  	  69 y/o M with PMH of parkinson's disease, HTN, seizure disorder, NIDDM, hyponatremia, BPH, osteoporosis, GERD, tinea ppedis, gait abnormality, nontoxic uninodular goiter, acquired cyst of kidney, h/o PNA, p/w lethargy    *Proctitis on CT  - c/w Cipro / flagyl  -diet has been advanced, and patient tolerating well  -Discharge patient tommarrow on oral abx    *Encephalomalacia / gliosis of L occipitotemporal lobe w/ associated gyriform coarse calcifciations - remote infarct   -neuro consult apprecaited  -no acute interventions at this time.     *DM2  -Humalog ISS  -Diabetic diet  -Hold oral meds during hospitalization    *R thigh Excoriations / bug bites?  -Lyme panel  -ID consult  -Derm referral if no resolution    *Hyponatremia  -Has a h/o hyponatremia, continue to trend in AM    *Pulmonary nodule noted on CT  -Will need to f/u outpatient with pulm and serial imaging    *Hypodense liver lesions  -Will need to have outpatient abdominal u/s and f/u outpatient with GI     *B/L renal cysts / hypodense lesions  -Will need to have outpatient renal u/s and f/u with nephro for further evaluation    *Bony hemangioma noted on CT  -No neurological symptoms reported at this time  -Outpatient neurosurgery evaluation    *H/o osteoporosis w/ compression deformities  -Vitamin D level  -Outpatient f/u for further evaluation and management w/ PCP     *Degenerative disc disease of spine  -Outpatient  f/u    *H/o Parkinson's disease / HTN / Seizure disorder / BPH / GERD / goiter   -F/u outpatient for further management    *DVT ppx  -SCDs     *hx of seizures- c/w depakote and keppra

## 2018-07-26 ENCOUNTER — TRANSCRIPTION ENCOUNTER (OUTPATIENT)
Age: 68
End: 2018-07-26

## 2018-07-26 VITALS
DIASTOLIC BLOOD PRESSURE: 63 MMHG | SYSTOLIC BLOOD PRESSURE: 99 MMHG | TEMPERATURE: 98 F | HEART RATE: 72 BPM | RESPIRATION RATE: 18 BRPM | OXYGEN SATURATION: 98 %

## 2018-07-26 LAB
GLUCOSE BLDC GLUCOMTR-MCNC: 220 MG/DL — HIGH (ref 70–99)
GLUCOSE BLDC GLUCOMTR-MCNC: 222 MG/DL — HIGH (ref 70–99)

## 2018-07-26 RX ORDER — DOCUSATE SODIUM 100 MG
1 CAPSULE ORAL
Qty: 0 | Refills: 0 | COMMUNITY

## 2018-07-26 RX ORDER — CIPROFLOXACIN LACTATE 400MG/40ML
1 VIAL (ML) INTRAVENOUS
Qty: 14 | Refills: 0 | OUTPATIENT
Start: 2018-07-26 | End: 2018-08-01

## 2018-07-26 RX ORDER — METRONIDAZOLE 500 MG
500 TABLET ORAL THREE TIMES A DAY
Qty: 0 | Refills: 0 | Status: DISCONTINUED | OUTPATIENT
Start: 2018-07-26 | End: 2018-07-26

## 2018-07-26 RX ORDER — CIPROFLOXACIN LACTATE 400MG/40ML
500 VIAL (ML) INTRAVENOUS EVERY 12 HOURS
Qty: 0 | Refills: 0 | Status: DISCONTINUED | OUTPATIENT
Start: 2018-07-26 | End: 2018-07-26

## 2018-07-26 RX ORDER — METRONIDAZOLE 500 MG
1 TABLET ORAL
Qty: 21 | Refills: 0 | OUTPATIENT
Start: 2018-07-26 | End: 2018-08-01

## 2018-07-26 RX ADMIN — QUETIAPINE FUMARATE 50 MILLIGRAM(S): 200 TABLET, FILM COATED ORAL at 12:32

## 2018-07-26 RX ADMIN — Medication 2: at 12:32

## 2018-07-26 RX ADMIN — LEVETIRACETAM 750 MILLIGRAM(S): 250 TABLET, FILM COATED ORAL at 05:25

## 2018-07-26 RX ADMIN — LORATADINE 10 MILLIGRAM(S): 10 TABLET ORAL at 12:23

## 2018-07-26 RX ADMIN — ESCITALOPRAM OXALATE 40 MILLIGRAM(S): 10 TABLET, FILM COATED ORAL at 12:24

## 2018-07-26 RX ADMIN — SODIUM CHLORIDE 1 GRAM(S): 9 INJECTION INTRAMUSCULAR; INTRAVENOUS; SUBCUTANEOUS at 12:32

## 2018-07-26 RX ADMIN — DIVALPROEX SODIUM 1000 MILLIGRAM(S): 500 TABLET, DELAYED RELEASE ORAL at 11:43

## 2018-07-26 RX ADMIN — Medication 100 MILLIGRAM(S): at 05:26

## 2018-07-26 RX ADMIN — Medication 81 MILLIGRAM(S): at 11:49

## 2018-07-26 RX ADMIN — Medication 100 MILLIGRAM(S): at 05:25

## 2018-07-26 RX ADMIN — Medication 500 MILLIGRAM(S): at 13:14

## 2018-07-26 RX ADMIN — Medication 200 MILLIGRAM(S): at 06:23

## 2018-07-26 RX ADMIN — Medication 1 TABLET(S): at 11:49

## 2018-07-26 RX ADMIN — Medication 1000 UNIT(S): at 11:49

## 2018-07-26 RX ADMIN — Medication 10 MILLIGRAM(S): at 13:14

## 2018-07-26 RX ADMIN — Medication 2: at 07:50

## 2018-07-26 RX ADMIN — CARBIDOPA AND LEVODOPA 1 TABLET(S): 25; 100 TABLET ORAL at 11:42

## 2018-07-26 NOTE — DISCHARGE NOTE ADULT - CARE PLAN
Principal Discharge DX:	Proctitis  Goal:	complete antibiotics  Assessment and plan of treatment:	Take cipro and flagyl as prescribed for  7 more days.  Secondary Diagnosis:	Ear abrasion  Goal:	no picking right ear.  Follow up with ENT as needed.  Assessment and plan of treatment:	Superficial lac right ear due to digital manipulation.   Local cleansing and apply bacitracin.  Secondary Diagnosis:	Excoriation  Goal:	follow up with Derm as needed  Assessment and plan of treatment:	Excoriations right thigh improving- no   infection or cellulitis.  Goal:	close follow up with Primary physician and specialists for incidental findings on imaging  Assessment and plan of treatment:	1. Bilateral renal cysts/hypodense lesions- follow up with PMD for renal Ultrasound and the nephrologist as needed.  2. hypodense liver lesions- follow up with PMD for abdominal US and then GI as needed.  3. 4mm RML Pulmonary nodule- stable and benign appearing.  follow up with Pulm for repeat imaging/ CT    4. Bony hemangioma - follow up with PMD for repeat CT Principal Discharge DX:	Proctitis  Goal:	complete antibiotics  Assessment and plan of treatment:	Take cipro and flagyl as prescribed for  7 more days.  Secondary Diagnosis:	Ear abrasion  Goal:	no picking right ear.  Follow up with ENT as needed.  Assessment and plan of treatment:	Superficial lac right ear due to digital manipulation.   Local cleansing and apply bacitracin.  Secondary Diagnosis:	Excoriation  Goal:	follow up with Derm as needed  Assessment and plan of treatment:	Excoriations right thigh improving- no   infection or cellulitis.  Goal:	close follow up with Primary physician and specialists for incidental findings on imaging  Assessment and plan of treatment:	1. Bilateral renal cysts/hypodense lesions- follow up with PMD for renal Ultrasound and the nephrologist as needed.  2. hypodense liver lesions- follow up with PMD for abdominal US and then GI as needed.  3. 4mm RML Pulmonary nodule- stable and benign appearing.  follow up with Pulm for repeat imaging/ CT    4. redemonstration of a t3 vertebral body/left pedicle Bony hemangioma as seen on CT chest in 2014.   - follow up with PMD to review

## 2018-07-26 NOTE — PROGRESS NOTE ADULT - ASSESSMENT
67 y/o M with PMH of parkinson's disease, HTN, seizure disorder, NIDDM, hyponatremia, BPH, osteoporosis, GERD, tinea pedis, gait abnormality, nontoxic uninodular goiter, acquired cyst of kidney, h/o PNA,, lives in group home admitted on 7/23 for evaluation of increased lethargy after returning from camp at [place]; he was noted to have bug bites on right hip that were itchy and he was scratching them and yellow drainage was noted; he was treated with keflex with good improvement however he continues to not walk. History per medical record and aide at the bedside.   1. Patient admitted with bug bites on right thigh  - will rule out Lyme via serology  - no evidence of infection or cellulitis at the right hip  2. Severe proctitis  - follow up cultures   - iv hydration and supportive care   - reviewed prior medical records to evaluate for resistant or atypical pathogens   - will change cipro and flagyl to po  - okay to discharge on cipro and flagyl for 7 more days  - tolerating antibiotics without rashes or side effects   3. other issues: parkinson's disease, HTN, seizure disorder, NIDDM, hyponatremia, BPH, osteoporosis, GERD, tinea pedis, gait abnormality, nontoxic uninodular goiter, acquired cyst of kidney  - per medicine  If further ID issues please reconsult
67 y/o M with PMH of parkinson's disease, HTN, seizure disorder, NIDDM, hyponatremia, BPH, osteoporosis, GERD, tinea pedis, gait abnormality, nontoxic uninodular goiter, acquired cyst of kidney, h/o PNA,, lives in group home admitted on 7/23 for evaluation of increased lethargy after returning from camp at [place]; he was noted to have bug bites on right hip that were itchy and he was scratching them and yellow drainage was noted; he was treated with keflex with good improvement however he continues to not walk. History per medical record and aide at the bedside.   1. Patient admitted with bug bites on right thigh  - will rule out Lyme via serology  - no evidence of infection or cellulitis at the right hip  2. Severe proctitis  - follow up cultures   - iv hydration and supportive care   - reviewed prior medical records to evaluate for resistant or atypical pathogens   - will continue cipro and flagyl as ordered, day #2  - tolerating antibiotics without rashes or side effects   3. other issues: parkinson's disease, HTN, seizure disorder, NIDDM, hyponatremia, BPH, osteoporosis, GERD, tinea pedis, gait abnormality, nontoxic uninodular goiter, acquired cyst of kidney  - per medicine

## 2018-07-26 NOTE — DISCHARGE NOTE ADULT - PATIENT PORTAL LINK FT
You can access [facility], offered by [facility], by registering with the following website: [email or web address]

## 2018-07-26 NOTE — DISCHARGE NOTE ADULT - MEDICATION SUMMARY - MEDICATIONS TO TAKE
I will START or STAY ON the medications listed below when I get home from the hospital:    metroNIDAZOLE 500 mg oral tablet  -- 1 tab(s) by mouth 3 times a day MDD:take for 7 more days  -- Indication: For Proctitis    acetaminophen 500 mg oral tablet  -- 2 tab(s) by mouth every 8 hours, As Needed - for moderate pain  -- Indication: For Home med    aspirin 81 mg oral tablet, chewable  -- 1 tab(s) by mouth once a day  -- Indication: For Home med    tamsulosin 0.4 mg oral capsule  -- 1 cap(s) by mouth once a day  -- Indication: For Home med    divalproex sodium 500 mg oral tablet, extended release  -- 2 tab(s) by mouth 2 times a day  -- Indication: For Home med    levETIRAcetam 750 mg oral tablet  -- 1 tab(s) by mouth 2 times a day  -- Indication: For Home med    escitalopram 20 mg oral tablet  -- 2 tab(s) by mouth once a day (at bedtime)  -- Indication: For Home med    metFORMIN 1000 mg oral tablet  -- 1 tab(s) by mouth 2 times a day (with meals)  -- Indication: For Home med    loratadine 10 mg oral tablet  -- 1 tab(s) by mouth once a day  -- Indication: For Home med    carbidopa-levodopa 25 mg-100 mg oral tablet  -- 1 tab(s) by mouth 2 times a day  -- Indication: For Home med    carbidopa-levodopa 25 mg-100 mg oral tablet  -- 2 tab(s) by mouth once a day (at bedtime)  -- Indication: For Home med    QUEtiapine 50 mg oral tablet  -- 1 tab(s) by mouth once a day  -- Indication: For Home med    ARIPiprazole 30 mg oral tablet  -- 1 tab(s) by mouth once a day (at bedtime)  -- Indication: For Home med    ciclopirox 0.77% topical cream  -- Apply on skin to affected area 2 times a day  -- Indication: For Home med    Balmex Diaper Rash 11.3% topical cream  -- Apply on skin to affected area once a day, As Needed - for rash  -- Indication: For Home med    ketoconazole 2% topical cream  -- Apply on skin to affected area once a day      ******Apply to Face****  -- Indication: For Home med    Sodium Chloride 1000 mg oral tablet, soluble  -- 1 tab(s) by mouth once a day  -- Indication: For Home med    ciprofloxacin 500 mg oral tablet  -- 1 tab(s) by mouth every 12 hours MDD:take for 7more days  -- Indication: For Proctitis    oxybutynin 10 mg/24 hr oral tablet, extended release  -- 1 tab(s) by mouth once a day (at bedtime)  -- Indication: For Home med    Calcium 600+D 600 mg-200 intl units oral tablet  -- 1 tab(s) by mouth 2 times a day  -- Indication: For Home med    cholecalciferol 1000 intl units oral tablet  -- 1 tab(s) by mouth once a day  -- Indication: For Home med

## 2018-07-26 NOTE — DISCHARGE NOTE ADULT - PLAN OF CARE
complete antibiotics Take cipro and flagyl as prescribed for  7 more days. no picking right ear.  Follow up with ENT as needed. Superficial lac right ear due to digital manipulation.   Local cleansing and apply bacitracin. follow up with Derm as needed Excoriations right thigh improving- no   infection or cellulitis. close follow up with Primary physician and specialists for incidental findings on imaging 1. Bilateral renal cysts/hypodense lesions- follow up with PMD for renal Ultrasound and the nephrologist as needed.  2. hypodense liver lesions- follow up with PMD for abdominal US and then GI as needed.  3. 4mm RML Pulmonary nodule- stable and benign appearing.  follow up with Pulm for repeat imaging/ CT    4. Bony hemangioma - follow up with PMD for repeat CT 1. Bilateral renal cysts/hypodense lesions- follow up with PMD for renal Ultrasound and the nephrologist as needed.  2. hypodense liver lesions- follow up with PMD for abdominal US and then GI as needed.  3. 4mm RML Pulmonary nodule- stable and benign appearing.  follow up with Pulm for repeat imaging/ CT    4. redemonstration of a t3 vertebral body/left pedicle Bony hemangioma as seen on CT chest in 2014.   - follow up with PMD to review

## 2018-07-26 NOTE — PROGRESS NOTE ADULT - SUBJECTIVE AND OBJECTIVE BOX
hpi:    68y male w/ PMH parkinson's, htn, seizure disorder, bph, NIDDM, hyponatremia, osteoporosis, gerd, nontoxic uninodular goiter, admitted w/ proctitis.       ros    Vital Signs Last 24 Hrs  T(C): 36.4 (26 Jul 2018 04:27), Max: 36.8 (25 Jul 2018 11:19)  T(F): 97.5 (26 Jul 2018 04:27), Max: 98.3 (25 Jul 2018 11:19)  HR: 67 (26 Jul 2018 04:27) (67 - 84)  BP: 95/59 (26 Jul 2018 04:27) (93/72 - 95/59)  BP(mean): --  RR: 18 (26 Jul 2018 04:27) (18 - 18)  SpO2: 96% (26 Jul 2018 04:27) (96% - 96%)      physical            LABS: All Labs Reviewed:    07-25    133<L>  |  99  |  14  ----------------------------<  206<H>  4.3   |  30  |  0.62    Ca    8.3<L>      25 Jul 2018 07:49      < from: CT Abdomen and Pelvis w/ IV Cont (07.23.18 @ 14:24) >      IMPRESSION: No traumatic injury. Severe proctitis.    < end of copied text >    MEDICATIONS  (STANDING):  ARIPiprazole  Oral Tab/Cap - Peds 30 milliGRAM(s) Oral at bedtime  aspirin  chewable 81 milliGRAM(s) Oral daily  calcium carbonate 1250 mG  + Vitamin D (OsCal 500 + D) 1 Tablet(s) Oral daily  carbidopa/levodopa  25/100 1 Tablet(s) Oral at bedtime  carbidopa/levodopa  25/100 1 Tablet(s) Oral daily  cholecalciferol 1000 Unit(s) Oral daily  ciprofloxacin     Tablet 500 milliGRAM(s) Oral every 12 hours  dextrose 5%. 1000 milliLiter(s) (50 mL/Hr) IV Continuous <Continuous>  dextrose 50% Injectable 12.5 Gram(s) IV Push once  dextrose 50% Injectable 25 Gram(s) IV Push once  dextrose 50% Injectable 25 Gram(s) IV Push once  diVALproex ER 1000 milliGRAM(s) Oral <User Schedule>  docusate sodium 100 milliGRAM(s) Oral two times a day  escitalopram 40 milliGRAM(s) Oral daily  insulin lispro (HumaLOG) corrective regimen sliding scale   SubCutaneous three times a day before meals  insulin lispro (HumaLOG) corrective regimen sliding scale   SubCutaneous at bedtime  levETIRAcetam 750 milliGRAM(s) Oral two times a day  loratadine 10 milliGRAM(s) Oral daily  metroNIDAZOLE    Tablet 500 milliGRAM(s) Oral three times a day  oxybutynin 10 milliGRAM(s) Oral daily  QUEtiapine 50 milliGRAM(s) Oral daily  sodium chloride 1 Gram(s) Oral daily  sodium chloride 0.9%. 1000 milliLiter(s) (75 mL/Hr) IV Continuous <Continuous>  tamsulosin 0.4 milliGRAM(s) Oral at bedtime    MEDICATIONS  (PRN):  acetaminophen   Tablet 650 milliGRAM(s) Oral every 6 hours PRN for moderate pain  dextrose 40% Gel 15 Gram(s) Oral once PRN Blood Glucose LESS THAN 70 milliGRAM(s)/deciliter  glucagon  Injectable 1 milliGRAM(s) IntraMuscular once PRN Glucose LESS THAN 70 milligrams/deciliter      Assessment and Plan  68y male w/     *severe proctitis  - ID f/u appreciated- d/c on po cipro, flagyl  - cultures no growth    *Encephalomalacia / gliosis of L occipitotemporal lobe w/ associated gyriform coarse calcifciations - remote infarct   -neuro consult apprecaited  -no acute interventions at this time.     *IDDM  - ISS, diabetic diet    *R thigh Excoriations / bug bites?  -Lyme panel neg  -ID consult appreciated- no infection or cellulitis   -Derm referral outpatient if no resolution    *hx hyponatremia  - stable    Incidental findings  *B/L renal cysts / hypodense lesions  *hypodense liver lesions  *pulmonary nodule noted on CT   *bony hemangioma on CT   -outpt Pulm for repeat imaging  *outpt abdominal US, GI f/u   -Will need to have outpatient renal u/s and f/u with nephro for further evaluation  - outpt NSG eval     *hx Parkinson's , seizure disorder, htn  - stable, home meds, outpt f/u    *dvt px  - scds     Stable for transfer to group home. hpi:    68y male w/ PMH parkinson's, htn, seizure disorder, bph, NIDDM, hyponatremia, osteoporosis, gerd, nontoxic uninodular goiter, admitted w/ proctitis.   Pt denies abd pain, no diarrhea, no pruritus.  Pt is digging in ear w/ nail of right 5th digit and all fingernails have blood under nail.  Checked w/ otoscope- small lac from picking .  Discussed w/ nurse, aide at bedside and Jacqueline at group home.  She states patients at group home follow up with ENT every 3-6 months and she is ok with having him follow up when he returns.    ros- as per hpi above- 10pt ros negative.  No complaints     Vital Signs Last 24 Hrs  T(C): 36.4 (26 Jul 2018 04:27), Max: 36.8 (25 Jul 2018 11:19)  T(F): 97.5 (26 Jul 2018 04:27), Max: 98.3 (25 Jul 2018 11:19)  HR: 67 (26 Jul 2018 04:27) (67 - 84)  BP: 95/59 (26 Jul 2018 04:27) (93/72 - 95/59)  BP(mean): --  RR: 18 (26 Jul 2018 04:27) (18 - 18)  SpO2: 96% (26 Jul 2018 04:27) (96% - 96%)      PHYSICAL EXAM:    General: NAD, comfortably seated in bed  Neuro: no focal deficits  HEENT: NCAT, EOMI, right outer ear w/ dried blood and small lac visible w/ otoscope, TM intact   Neck: supple, no jvd   Respiratory: CTA b/l, no w/r/r  Cardiovascular: S1 and S2, RRR, no m/g/r  Gastrointestinal: +BS, soft, NTND  Extremities: No edema  Vascular: 2+ peripheral pulses  Musculoskeletal: full rom all extrem.    Skin: dried papules right hip         LABS: All Labs Reviewed:    07-25    133<L>  |  99  |  14  ----------------------------<  206<H>  4.3   |  30  |  0.62    Ca    8.3<L>      25 Jul 2018 07:49      < from: CT Abdomen and Pelvis w/ IV Cont (07.23.18 @ 14:24) >      IMPRESSION: No traumatic injury. Severe proctitis.    < end of copied text >    MEDICATIONS  (STANDING):  ARIPiprazole  Oral Tab/Cap - Peds 30 milliGRAM(s) Oral at bedtime  aspirin  chewable 81 milliGRAM(s) Oral daily  calcium carbonate 1250 mG  + Vitamin D (OsCal 500 + D) 1 Tablet(s) Oral daily  carbidopa/levodopa  25/100 1 Tablet(s) Oral at bedtime  carbidopa/levodopa  25/100 1 Tablet(s) Oral daily  cholecalciferol 1000 Unit(s) Oral daily  ciprofloxacin     Tablet 500 milliGRAM(s) Oral every 12 hours  dextrose 5%. 1000 milliLiter(s) (50 mL/Hr) IV Continuous <Continuous>  dextrose 50% Injectable 12.5 Gram(s) IV Push once  dextrose 50% Injectable 25 Gram(s) IV Push once  dextrose 50% Injectable 25 Gram(s) IV Push once  diVALproex ER 1000 milliGRAM(s) Oral <User Schedule>  docusate sodium 100 milliGRAM(s) Oral two times a day  escitalopram 40 milliGRAM(s) Oral daily  insulin lispro (HumaLOG) corrective regimen sliding scale   SubCutaneous three times a day before meals  insulin lispro (HumaLOG) corrective regimen sliding scale   SubCutaneous at bedtime  levETIRAcetam 750 milliGRAM(s) Oral two times a day  loratadine 10 milliGRAM(s) Oral daily  metroNIDAZOLE    Tablet 500 milliGRAM(s) Oral three times a day  oxybutynin 10 milliGRAM(s) Oral daily  QUEtiapine 50 milliGRAM(s) Oral daily  sodium chloride 1 Gram(s) Oral daily  sodium chloride 0.9%. 1000 milliLiter(s) (75 mL/Hr) IV Continuous <Continuous>  tamsulosin 0.4 milliGRAM(s) Oral at bedtime    MEDICATIONS  (PRN):  acetaminophen   Tablet 650 milliGRAM(s) Oral every 6 hours PRN for moderate pain  dextrose 40% Gel 15 Gram(s) Oral once PRN Blood Glucose LESS THAN 70 milliGRAM(s)/deciliter  glucagon  Injectable 1 milliGRAM(s) IntraMuscular once PRN Glucose LESS THAN 70 milligrams/deciliter      Assessment and Plan  68y male w/     *severe proctitis  - ID f/u appreciated- d/c on po cipro, flagyl  - cultures no growth    *outer ear lac  from digital manipulation  - blood under fingernails from picking  - local cleansing, apply bacitracin   - discussed w/ ENT and team, group home- outpatient follow up     *Encephalomalacia / gliosis of L occipitotemporal lobe w/ associated gyriform coarse calcifciations - remote infarct   -neuro consult apprecaited  -no acute interventions at this time.     *IDDM  - ISS, diabetic diet    *R thigh Excoriations / bug bites?  -Lyme panel neg  -ID consult appreciated- no infection or cellulitis   -Derm referral outpatient if no resolution    *hx hyponatremia  - stable    Incidental findings  *B/L renal cysts / hypodense lesions  *hypodense liver lesions  *pulmonary nodule noted on CT   *bony hemangioma on CT   -outpt Pulm for repeat imaging  *outpt abdominal US, GI f/u   -Will need to have outpatient renal u/s and f/u with nephro for further evaluation  - outpt NSG eval     *hx Parkinson's , seizure disorder, htn  - stable, home meds, outpt f/u    *dvt px  - scds     Stable for transfer to group home.   Resume all meds, diet and activities.  Patient may return to program.

## 2018-07-26 NOTE — DISCHARGE NOTE ADULT - HOSPITAL COURSE
Assessment and Plan  68y male w/     *severe proctitis  - ID f/u appreciated- d/c on po cipro, flagyl  - cultures no growth    *outer ear lac  from digital manipulation  - blood under fingernails from picking  - local cleansing, apply bacitracin   - discussed w/ ENT and team, group home- outpatient follow up     *Encephalomalacia / gliosis of L occipitotemporal lobe w/ associated gyriform coarse calcifciations - remote infarct   -neuro consult apprecaited  -no acute interventions at this time.     *IDDM  - ISS, diabetic diet    *R thigh Excoriations / bug bites?  -Lyme panel neg  -ID consult appreciated- no infection or cellulitis   -Derm referral outpatient if no resolution    *hx hyponatremia  - stable    Incidental findings  *B/L renal cysts / hypodense lesions  *hypodense liver lesions  *pulmonary nodule noted on CT   *bony hemangioma on CT   -outpt Pulm for repeat imaging  *outpt abdominal US, GI f/u   -Will need to have outpatient renal u/s and f/u with nephro for further evaluation  - outpt NSG eval     *hx Parkinson's , seizure disorder, htn  - stable, home meds, outpt f/u    *dvt px  - scds     Stable for transfer to group home.   Resume all meds, diet and activities.  Patient may return to program.

## 2018-07-26 NOTE — PROGRESS NOTE ADULT - SUBJECTIVE AND OBJECTIVE BOX
Patient is a 68y old  Male who presents with a chief complaint of lethargy (23 Jul 2018 21:12)    Date of service: 07-26-18 @ 09:14      Patient eating breakfast  Afebrile      ROS: no fever or chills; denies dizziness, no HA, no SOB or cough, no abdominal pain, no diarrhea or constipation; no dysuria, no urinary frequency, no legs pain, no rashes    MEDICATIONS  (STANDING):  ARIPiprazole  Oral Tab/Cap - Peds 30 milliGRAM(s) Oral at bedtime  aspirin  chewable 81 milliGRAM(s) Oral daily  calcium carbonate 1250 mG  + Vitamin D (OsCal 500 + D) 1 Tablet(s) Oral daily  carbidopa/levodopa  25/100 1 Tablet(s) Oral at bedtime  carbidopa/levodopa  25/100 1 Tablet(s) Oral daily  cholecalciferol 1000 Unit(s) Oral daily  ciprofloxacin     Tablet 500 milliGRAM(s) Oral every 12 hours  dextrose 5%. 1000 milliLiter(s) (50 mL/Hr) IV Continuous <Continuous>  dextrose 50% Injectable 12.5 Gram(s) IV Push once  dextrose 50% Injectable 25 Gram(s) IV Push once  dextrose 50% Injectable 25 Gram(s) IV Push once  diVALproex ER 1000 milliGRAM(s) Oral <User Schedule>  docusate sodium 100 milliGRAM(s) Oral two times a day  escitalopram 40 milliGRAM(s) Oral daily  insulin lispro (HumaLOG) corrective regimen sliding scale   SubCutaneous three times a day before meals  insulin lispro (HumaLOG) corrective regimen sliding scale   SubCutaneous at bedtime  levETIRAcetam 750 milliGRAM(s) Oral two times a day  loratadine 10 milliGRAM(s) Oral daily  metroNIDAZOLE    Tablet 500 milliGRAM(s) Oral three times a day  oxybutynin 10 milliGRAM(s) Oral daily  QUEtiapine 50 milliGRAM(s) Oral daily  sodium chloride 1 Gram(s) Oral daily  sodium chloride 0.9%. 1000 milliLiter(s) (75 mL/Hr) IV Continuous <Continuous>  tamsulosin 0.4 milliGRAM(s) Oral at bedtime    MEDICATIONS  (PRN):  acetaminophen   Tablet 650 milliGRAM(s) Oral every 6 hours PRN for moderate pain  dextrose 40% Gel 15 Gram(s) Oral once PRN Blood Glucose LESS THAN 70 milliGRAM(s)/deciliter  glucagon  Injectable 1 milliGRAM(s) IntraMuscular once PRN Glucose LESS THAN 70 milligrams/deciliter      Vital Signs Last 24 Hrs  T(C): 36.4 (26 Jul 2018 04:27), Max: 36.8 (25 Jul 2018 11:19)  T(F): 97.5 (26 Jul 2018 04:27), Max: 98.3 (25 Jul 2018 11:19)  HR: 67 (26 Jul 2018 04:27) (67 - 84)  BP: 95/59 (26 Jul 2018 04:27) (93/72 - 95/59)  BP(mean): --  RR: 18 (26 Jul 2018 04:27) (18 - 18)  SpO2: 96% (26 Jul 2018 04:27) (96% - 96%)    Physical Exam:      PE:    Constitutional: frail looking  HEENT: NC/AT, EOMI, PERRLA, conjunctivae clear; ears and nose atraumatic; pharynx clear  Neck: supple; thyroid not palpable  Back: no tenderness  Respiratory: respiratory effort normal; clear to auscultation  Cardiovascular: S1S2 regular, no murmurs  Abdomen: soft, not tender, not distended, positive BS; no liver or spleen organomegaly  Genitourinary: no suprapubic tenderness  Musculoskeletal: no muscle tenderness, no joint swelling or tenderness  Neurological/ Psychiatric:    moving all extremities  Skin: dried papules on right hip    Labs: all available labs reviewed                         Labs:                        12.6   5.70  )-----------( 148      ( 24 Jul 2018 07:07 )             36.4     07-25    133<L>  |  99  |  14  ----------------------------<  206<H>  4.3   |  30  |  0.62    Ca    8.3<L>      25 Jul 2018 07:49  Phos  3.7     07-24  Mg     1.6     07-24    TPro  5.8<L>  /  Alb  2.7<L>  /  TBili  0.4  /  DBili  x   /  AST  8<L>  /  ALT  6<L>  /  AlkPhos  46  07-24           Cultures:       Culture - Blood (collected 07-24-18 @ 07:07)  Source: .Blood None  Preliminary Report (07-25-18 @ 12:01):    No growth to date.    Culture - Blood (collected 07-23-18 @ 22:12)  Source: .Blood None  Preliminary Report (07-25-18 @ 09:01):    No growth to date.    Culture - Urine (collected 07-23-18 @ 13:56)  Source: .Urine None  Final Report (07-24-18 @ 22:32):    No growth    Borrelia burgdorferi IgG/IgM Antibodies (07.23.18 @ 22:12)    LYME IgG/IgM Antibodies Result: <0.01 Index    Lyme C6 Interpretation: Negative: METHOD: KENDRA      Reference Range: (values expressed as Lyme Index )                                < 0.90        Negative                                0.90 - 1.09   Equivocal                                >= 1.10        Positive      CDC/ASTPHLDGuidelines recommend that all samples judged equivocal or      positive be re-tested by immunoblot for confirmation of results.          < from: CT Abdomen and Pelvis w/ IV Cont (07.23.18 @ 14:24) >    EXAM:  CT ABDOMEN AND PELVIS IC                          EXAM:  CT CHEST IC                            PROCEDURE DATE:  07/23/2018          INTERPRETATION:  Clinical information:   Difficulty ambulating, diffuse   myalgia, lethargy, decreased p.o. intake.      COMPARISON: None    PROCEDURE:   CT of the Chest, Abdomen and Pelvis was performed with intravenous   contrast.   Imaging was performed through the chest in the arterial phase followed by   imaging of the abdomen and pelvis in the portal venous phase.  Intravenous contrast: 90 ml Omnipaque 350. 10 ml discarded.  Oral contrast:None.  Sagittal and coronal reformats were performed.    FINDINGS:    CHEST:     LOWER NECK: Within normal limits.  AXILLA, MEDIASTINUM AND DAVID: No lymphadenopathy or mediastinal   hemorrhage.  VESSELS: Normal caliber aorta, without evidence of pseudoaneurysm.  HEART: The heart is normal in size.No pericardial effusion.  PLEURA: No pleural effusion or pneumothorax.  LUNGS AND LARGE AIRWAYS: Patent central airways. Stable benign-appearing   4 mm right middle lobe pulmonary nodule. No suspicious pulmonary nodule,   mass or consolidation.  CHEST WALL:  Unremarkable    ABDOMEN AND PELVIS:    LIVER: Stable scattered subcentimeter hypodense lesions, too small to   characterize.  SPLEEN: Within normal limits.  PANCREAS: Within normal limits.  GALLBLADDER: Within normal limits.  BILE DUCTS: Normal caliber.  ADRENALS: Within normal limits.  KIDNEYS/URETERS: No mass, stone or hydronephrosis. Bilateral renal cysts   and subcentimeter hypodense lesions which are too small to characterize.    RETROPERITONEUM: No lymphadenopathy or hemorrhage.    VESSELS:  Within normal limits.      BOWEL: Severe wall thickening of the rectum. No bowel obstruction,   pneumatosis. Appendix normal.  PERITONEUM: No ascites or pneumoperitoneum.    REPRODUCTIVE ORGANS: The prostate is within normal limits.  BLADDER: Within normal limits    ABDOMINAL WALL: Within normal limits.  BONES: Numerous old bilateral healed rib fracturesold sternal fractures.   Multiple thoracolumbar compression deformities. No definite acute   fracture.    IMPRESSION: No traumatic injury. Severe proctitis.    < end of copied text >        Radiology: all available radiological tests reviewed    Advanced directives addressed: full resuscitation

## 2018-07-29 LAB
CULTURE RESULTS: SIGNIFICANT CHANGE UP
CULTURE RESULTS: SIGNIFICANT CHANGE UP
SPECIMEN SOURCE: SIGNIFICANT CHANGE UP
SPECIMEN SOURCE: SIGNIFICANT CHANGE UP

## 2018-07-30 DIAGNOSIS — E11.9 TYPE 2 DIABETES MELLITUS WITHOUT COMPLICATIONS: ICD-10-CM

## 2018-07-30 DIAGNOSIS — G20 PARKINSON'S DISEASE: ICD-10-CM
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
## 2018-07-30 DIAGNOSIS — K62.89 OTHER SPECIFIED DISEASES OF ANUS AND RECTUM: ICD-10-CM

## 2018-07-30 DIAGNOSIS — N40.0 BENIGN PROSTATIC HYPERPLASIA WITHOUT LOWER URINARY TRACT SYMPTOMS: ICD-10-CM

## 2018-07-30 DIAGNOSIS — G40.909 EPILEPSY, UNSPECIFIED, NOT INTRACTABLE, WITHOUT STATUS EPILEPTICUS: ICD-10-CM
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
## 2018-07-30 DIAGNOSIS — I10 ESSENTIAL (PRIMARY) HYPERTENSION: ICD-10-CM

## 2018-07-30 DIAGNOSIS — E87.1 HYPO-OSMOLALITY AND HYPONATREMIA: ICD-10-CM

## 2018-07-30 DIAGNOSIS — K21.9 GASTRO-ESOPHAGEAL REFLUX DISEASE WITHOUT ESOPHAGITIS: ICD-10-CM

## 2018-07-30 DIAGNOSIS — M81.0 AGE-RELATED OSTEOPOROSIS WITHOUT CURRENT PATHOLOGICAL FRACTURE: ICD-10-CM

## 2018-07-30 DIAGNOSIS — R62.7 ADULT FAILURE TO THRIVE: ICD-10-CM

## 2018-07-30 DIAGNOSIS — G93.89 OTHER SPECIFIED DISORDERS OF BRAIN: ICD-10-CM

## 2018-07-30 DIAGNOSIS — S70.369A: ICD-10-CM

## 2018-08-30 ENCOUNTER — INPATIENT (INPATIENT)
Facility: HOSPITAL | Age: 68
LOS: 10 days | Discharge: HOME | End: 2018-09-10
Attending: INTERNAL MEDICINE | Admitting: INTERNAL MEDICINE
Payer: MEDICARE

## 2018-08-30 VITALS
OXYGEN SATURATION: 98 % | HEIGHT: 68 IN | SYSTOLIC BLOOD PRESSURE: 121 MMHG | DIASTOLIC BLOOD PRESSURE: 90 MMHG | RESPIRATION RATE: 18 BRPM | HEART RATE: 110 BPM | WEIGHT: 164.91 LBS

## 2018-08-30 DIAGNOSIS — Z98.890 OTHER SPECIFIED POSTPROCEDURAL STATES: Chronic | ICD-10-CM

## 2018-08-30 PROBLEM — I10 ESSENTIAL (PRIMARY) HYPERTENSION: Chronic | Status: ACTIVE | Noted: 2018-07-23

## 2018-08-30 LAB
ACETONE SERPL-MCNC: ABNORMAL
ADD ON TEST-SPECIMEN IN LAB: SIGNIFICANT CHANGE UP
ALBUMIN SERPL ELPH-MCNC: 3.2 G/DL — LOW (ref 3.3–5)
ALP SERPL-CCNC: 62 U/L — SIGNIFICANT CHANGE UP (ref 40–120)
ALT FLD-CCNC: 15 U/L — SIGNIFICANT CHANGE UP (ref 12–78)
ANION GAP SERPL CALC-SCNC: 15 MMOL/L — SIGNIFICANT CHANGE UP (ref 5–17)
ANION GAP SERPL CALC-SCNC: 15 MMOL/L — SIGNIFICANT CHANGE UP (ref 5–17)
ANION GAP SERPL CALC-SCNC: 16 MMOL/L — SIGNIFICANT CHANGE UP (ref 5–17)
ANION GAP SERPL CALC-SCNC: 8 MMOL/L — SIGNIFICANT CHANGE UP (ref 5–17)
APPEARANCE UR: CLEAR — SIGNIFICANT CHANGE UP
APTT BLD: 34.9 SEC — SIGNIFICANT CHANGE UP (ref 27.5–37.4)
AST SERPL-CCNC: 14 U/L — LOW (ref 15–37)
BACTERIA # UR AUTO: ABNORMAL
BASE EXCESS BLDV CALC-SCNC: -7.3 MMOL/L — LOW (ref -2–2)
BASOPHILS # BLD AUTO: 0.03 K/UL — SIGNIFICANT CHANGE UP (ref 0–0.2)
BASOPHILS NFR BLD AUTO: 0.2 % — SIGNIFICANT CHANGE UP (ref 0–2)
BILIRUB SERPL-MCNC: 0.5 MG/DL — SIGNIFICANT CHANGE UP (ref 0.2–1.2)
BILIRUB UR-MCNC: NEGATIVE — SIGNIFICANT CHANGE UP
BLD GP AB SCN SERPL QL: SIGNIFICANT CHANGE UP
BUN SERPL-MCNC: 19 MG/DL — SIGNIFICANT CHANGE UP (ref 7–23)
BUN SERPL-MCNC: 26 MG/DL — HIGH (ref 7–23)
BUN SERPL-MCNC: 30 MG/DL — HIGH (ref 7–23)
BUN SERPL-MCNC: 38 MG/DL — HIGH (ref 7–23)
CALCIUM SERPL-MCNC: 8 MG/DL — LOW (ref 8.5–10.1)
CALCIUM SERPL-MCNC: 8.1 MG/DL — LOW (ref 8.5–10.1)
CALCIUM SERPL-MCNC: 8.5 MG/DL — SIGNIFICANT CHANGE UP (ref 8.5–10.1)
CALCIUM SERPL-MCNC: 9.4 MG/DL — SIGNIFICANT CHANGE UP (ref 8.5–10.1)
CHLORIDE SERPL-SCNC: 102 MMOL/L — SIGNIFICANT CHANGE UP (ref 96–108)
CHLORIDE SERPL-SCNC: 107 MMOL/L — SIGNIFICANT CHANGE UP (ref 96–108)
CHLORIDE SERPL-SCNC: 110 MMOL/L — HIGH (ref 96–108)
CHLORIDE SERPL-SCNC: 95 MMOL/L — LOW (ref 96–108)
CO2 SERPL-SCNC: 20 MMOL/L — LOW (ref 22–31)
CO2 SERPL-SCNC: 21 MMOL/L — LOW (ref 22–31)
CO2 SERPL-SCNC: 26 MMOL/L — SIGNIFICANT CHANGE UP (ref 22–31)
CO2 SERPL-SCNC: 26 MMOL/L — SIGNIFICANT CHANGE UP (ref 22–31)
COLOR SPEC: YELLOW — SIGNIFICANT CHANGE UP
CREAT SERPL-MCNC: 0.7 MG/DL — SIGNIFICANT CHANGE UP (ref 0.5–1.3)
CREAT SERPL-MCNC: 0.87 MG/DL — SIGNIFICANT CHANGE UP (ref 0.5–1.3)
CREAT SERPL-MCNC: 0.88 MG/DL — SIGNIFICANT CHANGE UP (ref 0.5–1.3)
CREAT SERPL-MCNC: 0.89 MG/DL — SIGNIFICANT CHANGE UP (ref 0.5–1.3)
DIFF PNL FLD: NEGATIVE — SIGNIFICANT CHANGE UP
EOSINOPHIL # BLD AUTO: 0 K/UL — SIGNIFICANT CHANGE UP (ref 0–0.5)
EOSINOPHIL NFR BLD AUTO: 0 % — SIGNIFICANT CHANGE UP (ref 0–6)
EPI CELLS # UR: SIGNIFICANT CHANGE UP
GLUCOSE BLDC GLUCOMTR-MCNC: 358 MG/DL — HIGH (ref 70–99)
GLUCOSE BLDC GLUCOMTR-MCNC: 386 MG/DL — HIGH (ref 70–99)
GLUCOSE SERPL-MCNC: 253 MG/DL — HIGH (ref 70–99)
GLUCOSE SERPL-MCNC: 343 MG/DL — HIGH (ref 70–99)
GLUCOSE SERPL-MCNC: 359 MG/DL — HIGH (ref 70–99)
GLUCOSE SERPL-MCNC: 97 MG/DL — SIGNIFICANT CHANGE UP (ref 70–99)
GLUCOSE UR QL: 1000 MG/DL
HCO3 BLDV-SCNC: 19 MMOL/L — LOW (ref 21–29)
HCT VFR BLD CALC: 39.4 % — SIGNIFICANT CHANGE UP (ref 39–50)
HGB BLD-MCNC: 13.3 G/DL — SIGNIFICANT CHANGE UP (ref 13–17)
IMM GRANULOCYTES NFR BLD AUTO: 0.9 % — SIGNIFICANT CHANGE UP (ref 0–1.5)
INR BLD: 1.13 RATIO — SIGNIFICANT CHANGE UP (ref 0.88–1.16)
KETONES UR-MCNC: ABNORMAL
LACTATE SERPL-SCNC: 3.1 MMOL/L — HIGH (ref 0.7–2)
LACTATE SERPL-SCNC: 3.4 MMOL/L — HIGH (ref 0.7–2)
LACTATE SERPL-SCNC: 4.3 MMOL/L — CRITICAL HIGH (ref 0.7–2)
LEUKOCYTE ESTERASE UR-ACNC: ABNORMAL
LIDOCAIN IGE QN: 44 U/L — LOW (ref 73–393)
LYMPHOCYTES # BLD AUTO: 0.98 K/UL — LOW (ref 1–3.3)
LYMPHOCYTES # BLD AUTO: 6.1 % — LOW (ref 13–44)
MCHC RBC-ENTMCNC: 31.7 PG — SIGNIFICANT CHANGE UP (ref 27–34)
MCHC RBC-ENTMCNC: 33.8 GM/DL — SIGNIFICANT CHANGE UP (ref 32–36)
MCV RBC AUTO: 94 FL — SIGNIFICANT CHANGE UP (ref 80–100)
MONOCYTES # BLD AUTO: 0.53 K/UL — SIGNIFICANT CHANGE UP (ref 0–0.9)
MONOCYTES NFR BLD AUTO: 3.3 % — SIGNIFICANT CHANGE UP (ref 2–14)
NEUTROPHILS # BLD AUTO: 14.49 K/UL — HIGH (ref 1.8–7.4)
NEUTROPHILS NFR BLD AUTO: 89.5 % — HIGH (ref 43–77)
NITRITE UR-MCNC: NEGATIVE — SIGNIFICANT CHANGE UP
NRBC # BLD: 0 /100 WBCS — SIGNIFICANT CHANGE UP (ref 0–0)
PCO2 BLDV: 46 MMHG — SIGNIFICANT CHANGE UP (ref 35–50)
PH BLDV: 7.25 — LOW (ref 7.35–7.45)
PH UR: 8 — SIGNIFICANT CHANGE UP (ref 5–8)
PLATELET # BLD AUTO: 189 K/UL — SIGNIFICANT CHANGE UP (ref 150–400)
PO2 BLDV: 126 MMHG — HIGH (ref 25–45)
POTASSIUM SERPL-MCNC: 4 MMOL/L — SIGNIFICANT CHANGE UP (ref 3.5–5.3)
POTASSIUM SERPL-MCNC: 4.3 MMOL/L — SIGNIFICANT CHANGE UP (ref 3.5–5.3)
POTASSIUM SERPL-MCNC: 4.5 MMOL/L — SIGNIFICANT CHANGE UP (ref 3.5–5.3)
POTASSIUM SERPL-MCNC: 4.6 MMOL/L — SIGNIFICANT CHANGE UP (ref 3.5–5.3)
POTASSIUM SERPL-SCNC: 4 MMOL/L — SIGNIFICANT CHANGE UP (ref 3.5–5.3)
POTASSIUM SERPL-SCNC: 4.3 MMOL/L — SIGNIFICANT CHANGE UP (ref 3.5–5.3)
POTASSIUM SERPL-SCNC: 4.5 MMOL/L — SIGNIFICANT CHANGE UP (ref 3.5–5.3)
POTASSIUM SERPL-SCNC: 4.6 MMOL/L — SIGNIFICANT CHANGE UP (ref 3.5–5.3)
PROT SERPL-MCNC: 6.7 GM/DL — SIGNIFICANT CHANGE UP (ref 6–8.3)
PROT UR-MCNC: NEGATIVE MG/DL — SIGNIFICANT CHANGE UP
PROTHROM AB SERPL-ACNC: 12.2 SEC — SIGNIFICANT CHANGE UP (ref 9.8–12.7)
RBC # BLD: 4.19 M/UL — LOW (ref 4.2–5.8)
RBC # FLD: 12.7 % — SIGNIFICANT CHANGE UP (ref 10.3–14.5)
RBC CASTS # UR COMP ASSIST: NEGATIVE /HPF — SIGNIFICANT CHANGE UP (ref 0–4)
SAO2 % BLDV: 98 % — HIGH (ref 67–88)
SODIUM SERPL-SCNC: 136 MMOL/L — SIGNIFICANT CHANGE UP (ref 135–145)
SODIUM SERPL-SCNC: 138 MMOL/L — SIGNIFICANT CHANGE UP (ref 135–145)
SODIUM SERPL-SCNC: 143 MMOL/L — SIGNIFICANT CHANGE UP (ref 135–145)
SODIUM SERPL-SCNC: 144 MMOL/L — SIGNIFICANT CHANGE UP (ref 135–145)
SP GR SPEC: 1.01 — SIGNIFICANT CHANGE UP (ref 1.01–1.02)
TYPE + AB SCN PNL BLD: SIGNIFICANT CHANGE UP
UROBILINOGEN FLD QL: NEGATIVE MG/DL — SIGNIFICANT CHANGE UP
WBC # BLD: 16.18 K/UL — HIGH (ref 3.8–10.5)
WBC # FLD AUTO: 16.18 K/UL — HIGH (ref 3.8–10.5)
WBC UR QL: SIGNIFICANT CHANGE UP

## 2018-08-30 PROCEDURE — 93010 ELECTROCARDIOGRAM REPORT: CPT

## 2018-08-30 PROCEDURE — 74177 CT ABD & PELVIS W/CONTRAST: CPT | Mod: 26

## 2018-08-30 PROCEDURE — 71045 X-RAY EXAM CHEST 1 VIEW: CPT | Mod: 26

## 2018-08-30 PROCEDURE — 99291 CRITICAL CARE FIRST HOUR: CPT

## 2018-08-30 RX ORDER — FAMOTIDINE 10 MG/ML
20 INJECTION INTRAVENOUS ONCE
Qty: 0 | Refills: 0 | Status: COMPLETED | OUTPATIENT
Start: 2018-08-30 | End: 2018-08-30

## 2018-08-30 RX ORDER — SODIUM CHLORIDE 9 MG/ML
1000 INJECTION, SOLUTION INTRAVENOUS
Qty: 0 | Refills: 0 | Status: DISCONTINUED | OUTPATIENT
Start: 2018-08-30 | End: 2018-08-30

## 2018-08-30 RX ORDER — CICLOPIROX OLAMINE 7.7 MG/G
1 CREAM TOPICAL
Qty: 0 | Refills: 0 | COMMUNITY

## 2018-08-30 RX ORDER — CARBIDOPA AND LEVODOPA 25; 100 MG/1; MG/1
1 TABLET ORAL
Qty: 0 | Refills: 0 | Status: DISCONTINUED | OUTPATIENT
Start: 2018-08-30 | End: 2018-09-10

## 2018-08-30 RX ORDER — SODIUM CHLORIDE 9 MG/ML
1000 INJECTION INTRAMUSCULAR; INTRAVENOUS; SUBCUTANEOUS ONCE
Qty: 0 | Refills: 0 | Status: COMPLETED | OUTPATIENT
Start: 2018-08-30 | End: 2018-08-30

## 2018-08-30 RX ORDER — KETOCONAZOLE 20 MG/G
1 AEROSOL, FOAM TOPICAL
Qty: 0 | Refills: 0 | COMMUNITY

## 2018-08-30 RX ORDER — ARIPIPRAZOLE 15 MG/1
1 TABLET ORAL
Qty: 0 | Refills: 0 | COMMUNITY

## 2018-08-30 RX ORDER — ROPINIROLE 8 MG/1
0.75 TABLET, FILM COATED, EXTENDED RELEASE ORAL THREE TIMES A DAY
Qty: 0 | Refills: 0 | Status: DISCONTINUED | OUTPATIENT
Start: 2018-08-30 | End: 2018-09-10

## 2018-08-30 RX ORDER — DIVALPROEX SODIUM 500 MG/1
500 TABLET, DELAYED RELEASE ORAL DAILY
Qty: 0 | Refills: 0 | Status: DISCONTINUED | OUTPATIENT
Start: 2018-08-30 | End: 2018-08-31

## 2018-08-30 RX ORDER — LEVETIRACETAM 250 MG/1
250 TABLET, FILM COATED ORAL
Qty: 0 | Refills: 0 | Status: DISCONTINUED | OUTPATIENT
Start: 2018-08-30 | End: 2018-09-10

## 2018-08-30 RX ORDER — PANTOPRAZOLE SODIUM 20 MG/1
8 TABLET, DELAYED RELEASE ORAL
Qty: 80 | Refills: 0 | Status: DISCONTINUED | OUTPATIENT
Start: 2018-08-30 | End: 2018-09-05

## 2018-08-30 RX ORDER — SODIUM CHLORIDE 9 MG/ML
1000 INJECTION INTRAMUSCULAR; INTRAVENOUS; SUBCUTANEOUS
Qty: 0 | Refills: 0 | Status: DISCONTINUED | OUTPATIENT
Start: 2018-08-30 | End: 2018-08-30

## 2018-08-30 RX ORDER — QUETIAPINE FUMARATE 200 MG/1
50 TABLET, FILM COATED ORAL DAILY
Qty: 0 | Refills: 0 | Status: DISCONTINUED | OUTPATIENT
Start: 2018-08-30 | End: 2018-09-10

## 2018-08-30 RX ORDER — INSULIN LISPRO 100/ML
5 VIAL (ML) SUBCUTANEOUS ONCE
Qty: 0 | Refills: 0 | Status: COMPLETED | OUTPATIENT
Start: 2018-08-30 | End: 2018-08-30

## 2018-08-30 RX ORDER — ZINC OXIDE 200 MG/G
1 OINTMENT TOPICAL
Qty: 0 | Refills: 0 | COMMUNITY

## 2018-08-30 RX ORDER — ASPIRIN/CALCIUM CARB/MAGNESIUM 324 MG
1 TABLET ORAL
Qty: 0 | Refills: 0 | COMMUNITY

## 2018-08-30 RX ORDER — DEXTROSE 50 % IN WATER 50 %
25 SYRINGE (ML) INTRAVENOUS
Qty: 0 | Refills: 0 | Status: DISCONTINUED | OUTPATIENT
Start: 2018-08-30 | End: 2018-09-09

## 2018-08-30 RX ORDER — INSULIN GLARGINE 100 [IU]/ML
20 INJECTION, SOLUTION SUBCUTANEOUS ONCE
Qty: 0 | Refills: 0 | Status: COMPLETED | OUTPATIENT
Start: 2018-08-30 | End: 2018-08-30

## 2018-08-30 RX ORDER — LEVETIRACETAM 250 MG/1
1 TABLET, FILM COATED ORAL
Qty: 0 | Refills: 0 | COMMUNITY

## 2018-08-30 RX ORDER — OXYBUTYNIN CHLORIDE 5 MG
10 TABLET ORAL AT BEDTIME
Qty: 0 | Refills: 0 | Status: DISCONTINUED | OUTPATIENT
Start: 2018-08-30 | End: 2018-09-10

## 2018-08-30 RX ORDER — METRONIDAZOLE 500 MG
500 TABLET ORAL ONCE
Qty: 0 | Refills: 0 | Status: COMPLETED | OUTPATIENT
Start: 2018-08-30 | End: 2018-08-30

## 2018-08-30 RX ORDER — PANTOPRAZOLE SODIUM 20 MG/1
80 TABLET, DELAYED RELEASE ORAL ONCE
Qty: 0 | Refills: 0 | Status: COMPLETED | OUTPATIENT
Start: 2018-08-30 | End: 2018-08-30

## 2018-08-30 RX ORDER — ARIPIPRAZOLE 15 MG/1
30 TABLET ORAL DAILY
Qty: 0 | Refills: 0 | Status: DISCONTINUED | OUTPATIENT
Start: 2018-08-30 | End: 2018-09-10

## 2018-08-30 RX ORDER — ONDANSETRON 8 MG/1
4 TABLET, FILM COATED ORAL ONCE
Qty: 0 | Refills: 0 | Status: COMPLETED | OUTPATIENT
Start: 2018-08-30 | End: 2018-08-30

## 2018-08-30 RX ORDER — CARBIDOPA AND LEVODOPA 25; 100 MG/1; MG/1
2 TABLET ORAL
Qty: 0 | Refills: 0 | COMMUNITY

## 2018-08-30 RX ORDER — SODIUM CHLORIDE 9 MG/ML
1000 INJECTION, SOLUTION INTRAVENOUS
Qty: 0 | Refills: 0 | Status: DISCONTINUED | OUTPATIENT
Start: 2018-08-30 | End: 2018-08-31

## 2018-08-30 RX ORDER — ESCITALOPRAM OXALATE 10 MG/1
20 TABLET, FILM COATED ORAL DAILY
Qty: 0 | Refills: 0 | Status: DISCONTINUED | OUTPATIENT
Start: 2018-08-30 | End: 2018-09-10

## 2018-08-30 RX ORDER — DEXTROSE 50 % IN WATER 50 %
50 SYRINGE (ML) INTRAVENOUS
Qty: 0 | Refills: 0 | Status: DISCONTINUED | OUTPATIENT
Start: 2018-08-30 | End: 2018-09-09

## 2018-08-30 RX ORDER — DOCUSATE SODIUM 100 MG
100 CAPSULE ORAL
Qty: 0 | Refills: 0 | Status: DISCONTINUED | OUTPATIENT
Start: 2018-08-30 | End: 2018-09-10

## 2018-08-30 RX ORDER — ARIPIPRAZOLE 15 MG/1
20 TABLET ORAL DAILY
Qty: 0 | Refills: 0 | Status: DISCONTINUED | OUTPATIENT
Start: 2018-08-30 | End: 2018-08-30

## 2018-08-30 RX ORDER — POTASSIUM CHLORIDE 20 MEQ
10 PACKET (EA) ORAL ONCE
Qty: 0 | Refills: 0 | Status: COMPLETED | OUTPATIENT
Start: 2018-08-30 | End: 2018-08-30

## 2018-08-30 RX ORDER — CARBIDOPA AND LEVODOPA 25; 100 MG/1; MG/1
1 TABLET ORAL
Qty: 0 | Refills: 0 | COMMUNITY

## 2018-08-30 RX ORDER — INSULIN HUMAN 100 [IU]/ML
6 INJECTION, SOLUTION SUBCUTANEOUS
Qty: 50 | Refills: 0 | Status: DISCONTINUED | OUTPATIENT
Start: 2018-08-30 | End: 2018-08-31

## 2018-08-30 RX ORDER — CIPROFLOXACIN LACTATE 400MG/40ML
400 VIAL (ML) INTRAVENOUS ONCE
Qty: 0 | Refills: 0 | Status: COMPLETED | OUTPATIENT
Start: 2018-08-30 | End: 2018-08-30

## 2018-08-30 RX ORDER — TAMSULOSIN HYDROCHLORIDE 0.4 MG/1
1 CAPSULE ORAL
Qty: 0 | Refills: 0 | COMMUNITY

## 2018-08-30 RX ORDER — TAMSULOSIN HYDROCHLORIDE 0.4 MG/1
0.4 CAPSULE ORAL AT BEDTIME
Qty: 0 | Refills: 0 | Status: DISCONTINUED | OUTPATIENT
Start: 2018-08-30 | End: 2018-09-10

## 2018-08-30 RX ORDER — SODIUM CHLORIDE 9 MG/ML
3 INJECTION INTRAMUSCULAR; INTRAVENOUS; SUBCUTANEOUS ONCE
Qty: 0 | Refills: 0 | Status: COMPLETED | OUTPATIENT
Start: 2018-08-30 | End: 2018-08-30

## 2018-08-30 RX ADMIN — INSULIN HUMAN 6 UNIT(S)/HR: 100 INJECTION, SOLUTION SUBCUTANEOUS at 13:45

## 2018-08-30 RX ADMIN — DIVALPROEX SODIUM 500 MILLIGRAM(S): 500 TABLET, DELAYED RELEASE ORAL at 16:58

## 2018-08-30 RX ADMIN — Medication 5 UNIT(S): at 12:19

## 2018-08-30 RX ADMIN — PANTOPRAZOLE SODIUM 10 MG/HR: 20 TABLET, DELAYED RELEASE ORAL at 11:55

## 2018-08-30 RX ADMIN — Medication 100 MILLIGRAM(S): at 16:14

## 2018-08-30 RX ADMIN — INSULIN GLARGINE 20 UNIT(S): 100 INJECTION, SOLUTION SUBCUTANEOUS at 23:00

## 2018-08-30 RX ADMIN — ESCITALOPRAM OXALATE 20 MILLIGRAM(S): 10 TABLET, FILM COATED ORAL at 16:57

## 2018-08-30 RX ADMIN — SODIUM CHLORIDE 1000 MILLILITER(S): 9 INJECTION INTRAMUSCULAR; INTRAVENOUS; SUBCUTANEOUS at 12:16

## 2018-08-30 RX ADMIN — Medication 100 MILLIEQUIVALENT(S): at 12:27

## 2018-08-30 RX ADMIN — ONDANSETRON 4 MILLIGRAM(S): 8 TABLET, FILM COATED ORAL at 09:11

## 2018-08-30 RX ADMIN — PANTOPRAZOLE SODIUM 80 MILLIGRAM(S): 20 TABLET, DELAYED RELEASE ORAL at 12:20

## 2018-08-30 RX ADMIN — Medication 10 MILLIEQUIVALENT(S): at 13:46

## 2018-08-30 RX ADMIN — SODIUM CHLORIDE 1000 MILLILITER(S): 9 INJECTION INTRAMUSCULAR; INTRAVENOUS; SUBCUTANEOUS at 11:53

## 2018-08-30 RX ADMIN — SODIUM CHLORIDE 1000 MILLILITER(S): 9 INJECTION INTRAMUSCULAR; INTRAVENOUS; SUBCUTANEOUS at 13:46

## 2018-08-30 RX ADMIN — Medication 200 MILLIGRAM(S): at 16:56

## 2018-08-30 RX ADMIN — SODIUM CHLORIDE 1000 MILLILITER(S): 9 INJECTION INTRAMUSCULAR; INTRAVENOUS; SUBCUTANEOUS at 13:43

## 2018-08-30 RX ADMIN — SODIUM CHLORIDE 3 MILLILITER(S): 9 INJECTION INTRAMUSCULAR; INTRAVENOUS; SUBCUTANEOUS at 09:12

## 2018-08-30 RX ADMIN — FAMOTIDINE 20 MILLIGRAM(S): 10 INJECTION INTRAVENOUS at 12:26

## 2018-08-30 RX ADMIN — SODIUM CHLORIDE 1000 MILLILITER(S): 9 INJECTION INTRAMUSCULAR; INTRAVENOUS; SUBCUTANEOUS at 11:54

## 2018-08-30 RX ADMIN — SODIUM CHLORIDE 150 MILLILITER(S): 9 INJECTION INTRAMUSCULAR; INTRAVENOUS; SUBCUTANEOUS at 16:56

## 2018-08-30 NOTE — H&P ADULT - NSHPLABSRESULTS_GEN_ALL_CORE
CBC Full  -  ( 30 Aug 2018 08:45 )  WBC Count : 16.18 K/uL  Hemoglobin : 13.3 g/dL  Hematocrit : 39.4 %  Platelet Count - Automated : 189 K/uL  Mean Cell Volume : 94.0 fl  Mean Cell Hemoglobin : 31.7 pg  Mean Cell Hemoglobin Concentration : 33.8 gm/dL  Auto Neutrophil # : 14.49 K/uL  Auto Lymphocyte # : 0.98 K/uL  Auto Monocyte # : 0.53 K/uL  Auto Eosinophil # : 0.00 K/uL  Auto Basophil # : 0.03 K/uL  Auto Neutrophil % : 89.5 %  Auto Lymphocyte % : 6.1 %  Auto Monocyte % : 3.3 %  Auto Eosinophil % : 0.0 %  Auto Basophil % : 0.2 %      08-30    138  |  102  |  26<H>  ----------------------------<  359<H>  4.3   |  21<L>  |  0.70    Ca    8.5      30 Aug 2018 13:07    TPro  6.7  /  Alb  3.2<L>  /  TBili  0.5  /  DBili  x   /  AST  14<L>  /  ALT  15  /  AlkPhos  62  08-30

## 2018-08-30 NOTE — ED PROVIDER NOTE - PROGRESS NOTE DETAILS
JW PT started on insulin drip in the ED for DKA. Potassium replacement initiated.  Comorbid hemorrhagic gastritis, proctitis.  Gap widening in the ED initially Pt sent to floor, reconsultation to ICU with worsening labs. Pt admitted to ICU with insulin drip initiated.  PT Tx for antibiotics.  Discussed with ICU attending Caroline and medicine attending Jairo.  Pt admitted in no acute distress with stable vital signs.

## 2018-08-30 NOTE — ED PROVIDER NOTE - CARE PLAN
Principal Discharge DX:	DKA (diabetic ketoacidoses) Principal Discharge DX:	DKA (diabetic ketoacidoses)  Secondary Diagnosis:	GIB (gastrointestinal bleeding)  Secondary Diagnosis:	Hemorrhagic gastritis

## 2018-08-30 NOTE — PROGRESS NOTE ADULT - SUBJECTIVE AND OBJECTIVE BOX
Critical care consult     Asked to see pateint by Dr. Atkins     Patient seen with resident Jose(see his note for further details)    HPI:     This patient is a 68 year old male with hx. of HTN, Mental retardation from group home, Type II Diabetes,  last week he was at [facility] for uncontrolled diabetes      and was sent home on same dose of metformin, Hgb AIC was 6.6.  He today presented with hematemesis times one and some dark stools.      Was otherwise no different than his baseline per aid.       IN ed found to have glucose of 340, had ketones, but bicarbonate 26 and no anion gap.       U/A negative       cxr clear, CT shows esophagitis    PAST MEDICAL & SURGICAL HISTORY:  Hypertension  Seizures  Parkinson disease  H/O foot surgery      FAMILY HISTORY:  cant obtain  Social Hx. from group home, non smoker      MEDICATIONS  (STANDING):  dextrose 50% Injectable 50 milliLiter(s) IV Push every 15 minutes  dextrose 50% Injectable 25 milliLiter(s) IV Push every 15 minutes  insulin Infusion 6 Unit(s)/Hr (6 mL/Hr) IV Continuous <Continuous>  pantoprazole Infusion 8 mG/Hr (10 mL/Hr) IV Continuous <Continuous>  sodium chloride 0.9%. 1000 milliLiter(s) (1000 mL/Hr) IV Continuous <Continuous>       REVIEW OF SYSTEMS: cant obtain given mental retardation              Vital Signs Last 24 Hrs  T(C): 36.8 (30 Aug 2018 11:31), Max: 37.3 (30 Aug 2018 09:51)  T(F): 98.3 (30 Aug 2018 11:31), Max: 99.1 (30 Aug 2018 09:51)  HR: 132 (30 Aug 2018 11:55) (110 - 132)  BP: 108/67 (30 Aug 2018 11:55) (108/67 - 121/90)  BP(mean): --  RR: 18 (30 Aug 2018 11:55) (18 - 19)  SpO2: 96% (30 Aug 2018 11:55) (96% - 100%)  Daily Height in cm: 172.72 (30 Aug 2018 08:06)    Daily     PHYSICAL EXAM:    General:     alert, no distress    Neuro:     Non verbal - Baseline  HEENT:    No JVD,     Cardiovascular:    sinus tachycardia no murmur    Lungs:  Lungs clear. no rales, no wheezing, .    Abdomen:  Normoactive bowel sounds. Soft, non tender    Skin:  Warm, dry, well-perfused. No rashes or other lesions.     Extremities:   no edema    LABS:                        13.3   16.18 )-----------( 189      ( 30 Aug 2018 08:45 )             39.4         136  |  95<L>  |  19  ----------------------------<  343<H>  4.0   |  26  |  0.89    Ca    9.4      30 Aug 2018 08:45    TPro  6.7  /  Alb  3.2<L>  /  TBili  0.5  /  DBili  x   /  AST  14<L>  /  ALT  15  /  AlkPhos  62      PT/INR - ( 30 Aug 2018 08:45 )   PT: 12.2 sec;   INR: 1.13 ratio         PTT - ( 30 Aug 2018 08:45 )  PTT:34.9 sec  Urinalysis Basic - ( 30 Aug 2018 08:45 )    Color: Yellow / Appearance: Clear / S.010 / pH: x  Gluc: x / Ketone: Large  / Bili: Negative / Urobili: Negative mg/dL   Blood: x / Protein: Negative mg/dL / Nitrite: Negative   Leuk Esterase: Trace / RBC: Negative /HPF / WBC 0-2   Sq Epi: x / Non Sq Epi: Occasional / Bacteria: Occasional        Lactate, Blood: 3.4 mmol/L ( @ 08:45)

## 2018-08-30 NOTE — ED PROVIDER NOTE - CRITICAL CARE PROVIDED
direct patient care (not related to procedure)/additional history taking/interpretation of diagnostic studies/consultation with other physicians/consult w/ pt's family directly relating to pts condition/documentation

## 2018-08-30 NOTE — H&P ADULT - HISTORY OF PRESENT ILLNESS
see consult note  Patient with MR vomitting with poorly controlled diabetes, admitted uncontrolled blood sugars, DKA worsening.

## 2018-08-30 NOTE — ED ADULT TRIAGE NOTE - CHIEF COMPLAINT QUOTE
pt BIBEMS from group home for 1 episode of emesis this morning. "prune juice" appearance as per aid. pt a&ox1-2. denies pain at triage. hx dm, non-insulin dependent and htn. pt did not take his am medications as per ems.

## 2018-08-30 NOTE — ED PROVIDER NOTE - MEDICAL DECISION MAKING DETAILS
68 YOM PMH HTN, Parkinson's, Seizrues, constipation, GERD, incontinence, non-verbal p/w hematemesis this morning.  VSS WNL.  Exam reveals diffuse abdominal pain and hematemesis.  DDX UGIB, obstruction, gastritis, metabolic abnormality. Given Hx CBC to screen for anemia, hematologic dyscrasia assess platelet count, evaluate for signs of infection, and screen for signs of hematologic malignancy. CMP to screen for electrolyte abnormality, assess renal function, liver function, biliary function, acid base status, fluid balance, and blood glucose. Urine studies.  CT A/P evaluate for obstruction, gastritis, r/o intra-abdominal pathology.  Symptomatic treatment.  Reassess. 68 YOM PMH HTN, Parkinson's, Seizrues, constipation, GERD, incontinence, non-verbal p/w hematemesis this morning.  VSS WNL.  Exam reveals diffuse abdominal pain and hematemesis.  DDX UGIB, obstruction, gastritis, metabolic abnormality including DKA, acute intra-abdominal process. Given Hx CBC to screen for anemia, hematologic dyscrasia assess platelet count, evaluate for signs of infection, and screen for signs of hematologic malignancy. CMP to screen for electrolyte abnormality, assess renal function, liver function, biliary function, acid base status, fluid balance, and blood glucose. Urine studies.  CT A/P evaluate for obstruction, gastritis, r/o intra-abdominal pathology.  VBG, Ketones assess acid base status and screen for DKA.  CXR evaluate for infection.  EKG screen for arrhythmia and complication of electrolyte abnormality.  Symptomatic treatment.  Reassess.

## 2018-08-30 NOTE — PROGRESS NOTE ADULT - ASSESSMENT
1. Patient with poorly controlled Diabetes, and very mild dka given no anion, gap and bicarbonate of 26 would check vbg to assess ph      check cultures to r/o infectious etiology but doubt     Patient needs to be on insulin would recomment given Long acting insulin now and regular insulin now. with contnued coverage     given above dont think needs insulin drip at this time, check f/u labs later in day to be certain not worsening     would place on protonix for esophagitis and  get GI consult, will follow as needed     d/w ER Dr. Atkins 1. Patient with poorly controlled Diabetes, and very mild dka given no anion, gap and bicarbonate of 26 would check vbg to assess ph      check cultures to r/o infectious etiology but doubt     Patient needs to be on insulin would recomment given Long acting insulin now and more regular   insulin now ,the 5 units he got earlier not enough with contnued coverage     given above dont think needs insulin drip at this time, check f/u labs later in day to be certain not worsening     would place on protonix for esophagitis and  get GI consult, will follow as needed     d/w ER Dr. Atkins

## 2018-08-30 NOTE — H&P ADULT - NSHPPHYSICALEXAM_GEN_ALL_CORE
General comfortable  HEENT - nc at, mucous membranes dry  neck supple  cv rrr sinus, tachycardia  lungs clear  abdomen soft non tender  extremity trace edema

## 2018-08-30 NOTE — ED PROVIDER NOTE - OBJECTIVE STATEMENT
68 YOM PMH HTN, Parkinson's, Seizrues, non-verbal p/w hematemesis this morning.  1 large episode. Pt unable to provide additional history today or complete ROS. 68 YOM PMH HTN, Parkinson's, Seizrues, constipation, GERD, incontinence, non-verbal p/w hematemesis this morning.  1 large episode. Pt unable to provide additional history today or complete ROS.  Per transfer note: Vomited x1 clinic brown fluid. 68 YOM PMH MR, HTN, Parkinson's, Seizrues, constipation, GERD, incontinence, non-verbal p/w hematemesis this morning.  1 large episode. Pt unable to provide additional history today or complete ROS.  Per transfer note: Vomited x1 clinic brown fluid.  Pt sent to the ED.

## 2018-08-30 NOTE — ED ADULT NURSE NOTE - NSIMPLEMENTINTERV_GEN_ALL_ED
Implemented All Fall with Harm Risk Interventions:  [place] to call system. Call bell, personal items and telephone within reach. Instruct patient to call for assistance. Room bathroom lighting operational. Non-slip footwear when patient is off stretcher. Physically safe environment: no spills, clutter or unnecessary equipment. Stretcher in lowest position, wheels locked, appropriate side rails in place. Provide visual cue, wrist band, yellow gown, etc. Monitor gait and stability. Monitor for mental status changes and reorient to person, place, and time. Review medications for side effects contributing to fall risk. Reinforce activity limits and safety measures with patient and family. Provide visual clues: red socks.

## 2018-08-31 LAB
ANION GAP SERPL CALC-SCNC: 11 MMOL/L — SIGNIFICANT CHANGE UP (ref 5–17)
ANION GAP SERPL CALC-SCNC: 8 MMOL/L — SIGNIFICANT CHANGE UP (ref 5–17)
ANION GAP SERPL CALC-SCNC: 9 MMOL/L — SIGNIFICANT CHANGE UP (ref 5–17)
B-OH-BUTYR SERPL-SCNC: 4.6 MMOL/L — HIGH
BUN SERPL-MCNC: 40 MG/DL — HIGH (ref 7–23)
BUN SERPL-MCNC: 43 MG/DL — HIGH (ref 7–23)
BUN SERPL-MCNC: 45 MG/DL — HIGH (ref 7–23)
CALCIUM SERPL-MCNC: 7.6 MG/DL — LOW (ref 8.5–10.1)
CALCIUM SERPL-MCNC: 8 MG/DL — LOW (ref 8.5–10.1)
CALCIUM SERPL-MCNC: 8 MG/DL — LOW (ref 8.5–10.1)
CHLORIDE SERPL-SCNC: 108 MMOL/L — SIGNIFICANT CHANGE UP (ref 96–108)
CHLORIDE SERPL-SCNC: 109 MMOL/L — HIGH (ref 96–108)
CHLORIDE SERPL-SCNC: 111 MMOL/L — HIGH (ref 96–108)
CHOLEST SERPL-MCNC: 73 MG/DL — SIGNIFICANT CHANGE UP (ref 10–199)
CO2 SERPL-SCNC: 23 MMOL/L — SIGNIFICANT CHANGE UP (ref 22–31)
CO2 SERPL-SCNC: 23 MMOL/L — SIGNIFICANT CHANGE UP (ref 22–31)
CO2 SERPL-SCNC: 24 MMOL/L — SIGNIFICANT CHANGE UP (ref 22–31)
CREAT SERPL-MCNC: 0.67 MG/DL — SIGNIFICANT CHANGE UP (ref 0.5–1.3)
CREAT SERPL-MCNC: 0.83 MG/DL — SIGNIFICANT CHANGE UP (ref 0.5–1.3)
CREAT SERPL-MCNC: 0.94 MG/DL — SIGNIFICANT CHANGE UP (ref 0.5–1.3)
CULTURE RESULTS: NO GROWTH — SIGNIFICANT CHANGE UP
GLUCOSE SERPL-MCNC: 255 MG/DL — HIGH (ref 70–99)
GLUCOSE SERPL-MCNC: 271 MG/DL — HIGH (ref 70–99)
GLUCOSE SERPL-MCNC: 341 MG/DL — HIGH (ref 70–99)
HBA1C BLD-MCNC: 7.9 % — HIGH (ref 4–5.6)
HCT VFR BLD CALC: 25.9 % — LOW (ref 39–50)
HDLC SERPL-MCNC: 34 MG/DL — LOW
HGB BLD-MCNC: 8.7 G/DL — LOW (ref 13–17)
HGB BLD-MCNC: 8.7 G/DL — LOW (ref 13–17)
LIPID PNL WITH DIRECT LDL SERPL: 27 MG/DL — SIGNIFICANT CHANGE UP
MAGNESIUM SERPL-MCNC: 1.6 MG/DL — SIGNIFICANT CHANGE UP (ref 1.6–2.6)
MAGNESIUM SERPL-MCNC: 1.8 MG/DL — SIGNIFICANT CHANGE UP (ref 1.6–2.6)
MCHC RBC-ENTMCNC: 31.5 PG — SIGNIFICANT CHANGE UP (ref 27–34)
MCHC RBC-ENTMCNC: 33.6 GM/DL — SIGNIFICANT CHANGE UP (ref 32–36)
MCV RBC AUTO: 93.8 FL — SIGNIFICANT CHANGE UP (ref 80–100)
NRBC # BLD: 0 /100 WBCS — SIGNIFICANT CHANGE UP (ref 0–0)
PHOSPHATE SERPL-MCNC: 1.9 MG/DL — LOW (ref 2.5–4.5)
PHOSPHATE SERPL-MCNC: 2.1 MG/DL — LOW (ref 2.5–4.5)
PLATELET # BLD AUTO: 169 K/UL — SIGNIFICANT CHANGE UP (ref 150–400)
POTASSIUM SERPL-MCNC: 3.8 MMOL/L — SIGNIFICANT CHANGE UP (ref 3.5–5.3)
POTASSIUM SERPL-MCNC: 4.3 MMOL/L — SIGNIFICANT CHANGE UP (ref 3.5–5.3)
POTASSIUM SERPL-MCNC: 4.3 MMOL/L — SIGNIFICANT CHANGE UP (ref 3.5–5.3)
POTASSIUM SERPL-SCNC: 3.8 MMOL/L — SIGNIFICANT CHANGE UP (ref 3.5–5.3)
POTASSIUM SERPL-SCNC: 4.3 MMOL/L — SIGNIFICANT CHANGE UP (ref 3.5–5.3)
POTASSIUM SERPL-SCNC: 4.3 MMOL/L — SIGNIFICANT CHANGE UP (ref 3.5–5.3)
RBC # BLD: 2.76 M/UL — LOW (ref 4.2–5.8)
RBC # FLD: 13.3 % — SIGNIFICANT CHANGE UP (ref 10.3–14.5)
SODIUM SERPL-SCNC: 140 MMOL/L — SIGNIFICANT CHANGE UP (ref 135–145)
SODIUM SERPL-SCNC: 141 MMOL/L — SIGNIFICANT CHANGE UP (ref 135–145)
SODIUM SERPL-SCNC: 145 MMOL/L — SIGNIFICANT CHANGE UP (ref 135–145)
SPECIMEN SOURCE: SIGNIFICANT CHANGE UP
TOTAL CHOLESTEROL/HDL RATIO MEASUREMENT: 2.1 RATIO — LOW (ref 3.4–9.6)
TRIGL SERPL-MCNC: 59 MG/DL — SIGNIFICANT CHANGE UP (ref 10–149)
WBC # BLD: 14.39 K/UL — HIGH (ref 3.8–10.5)
WBC # FLD AUTO: 14.39 K/UL — HIGH (ref 3.8–10.5)

## 2018-08-31 RX ORDER — MINERAL OIL
133 OIL (ML) MISCELLANEOUS ONCE
Qty: 0 | Refills: 0 | Status: COMPLETED | OUTPATIENT
Start: 2018-08-31 | End: 2018-08-31

## 2018-08-31 RX ORDER — DIVALPROEX SODIUM 500 MG/1
1000 TABLET, DELAYED RELEASE ORAL DAILY
Qty: 0 | Refills: 0 | Status: DISCONTINUED | OUTPATIENT
Start: 2018-08-31 | End: 2018-09-10

## 2018-08-31 RX ORDER — INSULIN GLARGINE 100 [IU]/ML
20 INJECTION, SOLUTION SUBCUTANEOUS EVERY MORNING
Qty: 0 | Refills: 0 | Status: DISCONTINUED | OUTPATIENT
Start: 2018-09-01 | End: 2018-09-02

## 2018-08-31 RX ORDER — INSULIN LISPRO 100/ML
VIAL (ML) SUBCUTANEOUS
Qty: 0 | Refills: 0 | Status: DISCONTINUED | OUTPATIENT
Start: 2018-08-31 | End: 2018-09-10

## 2018-08-31 RX ORDER — DEXTROSE 50 % IN WATER 50 %
15 SYRINGE (ML) INTRAVENOUS ONCE
Qty: 0 | Refills: 0 | Status: DISCONTINUED | OUTPATIENT
Start: 2018-08-31 | End: 2018-09-09

## 2018-08-31 RX ORDER — INSULIN LISPRO 100/ML
VIAL (ML) SUBCUTANEOUS AT BEDTIME
Qty: 0 | Refills: 0 | Status: DISCONTINUED | OUTPATIENT
Start: 2018-08-31 | End: 2018-09-10

## 2018-08-31 RX ORDER — INSULIN LISPRO 100/ML
VIAL (ML) SUBCUTANEOUS EVERY 6 HOURS
Qty: 0 | Refills: 0 | Status: DISCONTINUED | OUTPATIENT
Start: 2018-08-31 | End: 2018-08-31

## 2018-08-31 RX ORDER — SODIUM CHLORIDE 9 MG/ML
1000 INJECTION, SOLUTION INTRAVENOUS
Qty: 0 | Refills: 0 | Status: DISCONTINUED | OUTPATIENT
Start: 2018-08-31 | End: 2018-09-09

## 2018-08-31 RX ORDER — DIVALPROEX SODIUM 500 MG/1
1000 TABLET, DELAYED RELEASE ORAL AT BEDTIME
Qty: 0 | Refills: 0 | Status: DISCONTINUED | OUTPATIENT
Start: 2018-08-31 | End: 2018-09-10

## 2018-08-31 RX ORDER — INSULIN LISPRO 100/ML
10 VIAL (ML) SUBCUTANEOUS ONCE
Qty: 0 | Refills: 0 | Status: COMPLETED | OUTPATIENT
Start: 2018-08-31 | End: 2018-08-31

## 2018-08-31 RX ORDER — GLUCAGON INJECTION, SOLUTION 0.5 MG/.1ML
1 INJECTION, SOLUTION SUBCUTANEOUS ONCE
Qty: 0 | Refills: 0 | Status: DISCONTINUED | OUTPATIENT
Start: 2018-08-31 | End: 2018-09-09

## 2018-08-31 RX ORDER — INSULIN GLARGINE 100 [IU]/ML
20 INJECTION, SOLUTION SUBCUTANEOUS AT BEDTIME
Qty: 0 | Refills: 0 | Status: DISCONTINUED | OUTPATIENT
Start: 2018-08-31 | End: 2018-09-03

## 2018-08-31 RX ORDER — INSULIN GLARGINE 100 [IU]/ML
20 INJECTION, SOLUTION SUBCUTANEOUS ONCE
Qty: 0 | Refills: 0 | Status: COMPLETED | OUTPATIENT
Start: 2018-08-31 | End: 2018-08-31

## 2018-08-31 RX ORDER — POLYETHYLENE GLYCOL 3350 17 G/17G
17 POWDER, FOR SOLUTION ORAL
Qty: 0 | Refills: 0 | Status: DISCONTINUED | OUTPATIENT
Start: 2018-08-31 | End: 2018-09-10

## 2018-08-31 RX ADMIN — QUETIAPINE FUMARATE 50 MILLIGRAM(S): 200 TABLET, FILM COATED ORAL at 12:50

## 2018-08-31 RX ADMIN — CARBIDOPA AND LEVODOPA 1 TABLET(S): 25; 100 TABLET ORAL at 13:45

## 2018-08-31 RX ADMIN — PANTOPRAZOLE SODIUM 10 MG/HR: 20 TABLET, DELAYED RELEASE ORAL at 10:30

## 2018-08-31 RX ADMIN — CARBIDOPA AND LEVODOPA 1 TABLET(S): 25; 100 TABLET ORAL at 17:50

## 2018-08-31 RX ADMIN — LEVETIRACETAM 250 MILLIGRAM(S): 250 TABLET, FILM COATED ORAL at 07:22

## 2018-08-31 RX ADMIN — INSULIN GLARGINE 20 UNIT(S): 100 INJECTION, SOLUTION SUBCUTANEOUS at 10:53

## 2018-08-31 RX ADMIN — Medication 8: at 16:06

## 2018-08-31 RX ADMIN — Medication 133 MILLILITER(S): at 14:05

## 2018-08-31 RX ADMIN — Medication 10: at 12:53

## 2018-08-31 RX ADMIN — CARBIDOPA AND LEVODOPA 1 TABLET(S): 25; 100 TABLET ORAL at 10:27

## 2018-08-31 RX ADMIN — PANTOPRAZOLE SODIUM 10 MG/HR: 20 TABLET, DELAYED RELEASE ORAL at 20:08

## 2018-08-31 RX ADMIN — ROPINIROLE 0.75 MILLIGRAM(S): 8 TABLET, FILM COATED, EXTENDED RELEASE ORAL at 22:10

## 2018-08-31 RX ADMIN — CARBIDOPA AND LEVODOPA 1 TABLET(S): 25; 100 TABLET ORAL at 07:22

## 2018-08-31 RX ADMIN — INSULIN GLARGINE 20 UNIT(S): 100 INJECTION, SOLUTION SUBCUTANEOUS at 22:20

## 2018-08-31 RX ADMIN — ROPINIROLE 0.75 MILLIGRAM(S): 8 TABLET, FILM COATED, EXTENDED RELEASE ORAL at 13:45

## 2018-08-31 RX ADMIN — Medication 100 MILLIGRAM(S): at 07:22

## 2018-08-31 RX ADMIN — ESCITALOPRAM OXALATE 20 MILLIGRAM(S): 10 TABLET, FILM COATED ORAL at 12:53

## 2018-08-31 RX ADMIN — ROPINIROLE 0.75 MILLIGRAM(S): 8 TABLET, FILM COATED, EXTENDED RELEASE ORAL at 07:22

## 2018-08-31 RX ADMIN — POLYETHYLENE GLYCOL 3350 17 GRAM(S): 17 POWDER, FOR SOLUTION ORAL at 17:49

## 2018-08-31 RX ADMIN — ARIPIPRAZOLE 30 MILLIGRAM(S): 15 TABLET ORAL at 12:50

## 2018-08-31 RX ADMIN — LEVETIRACETAM 250 MILLIGRAM(S): 250 TABLET, FILM COATED ORAL at 17:50

## 2018-08-31 RX ADMIN — Medication 100 MILLIGRAM(S): at 17:52

## 2018-08-31 RX ADMIN — TAMSULOSIN HYDROCHLORIDE 0.4 MILLIGRAM(S): 0.4 CAPSULE ORAL at 22:11

## 2018-08-31 RX ADMIN — DIVALPROEX SODIUM 1000 MILLIGRAM(S): 500 TABLET, DELAYED RELEASE ORAL at 12:48

## 2018-08-31 NOTE — PROGRESS NOTE ADULT - COMMENTS
pt offers no complaints - poorly able to give detailed answers due to mental retardation - No CP, No SOB, NO NVD.  All other ROS are NEGATIVE.

## 2018-08-31 NOTE — PROGRESS NOTE ADULT - ASSESSMENT
67yo M admitted with esophagitis and uncontrolled hyperglycemia  No evidence of DKA  tolerating diet  Hemodynamics and respiratory function reasonable  No evidence of ongoing GI bleeding.    Plan at this time is for transfer to med surg floor  continue LA insulin and sliding scale  monitor for s/s of bleeding  PPI  diet  mobilize  GI input appreciated  supportive care 69yo M admitted with esophagitis and uncontrolled hyperglycemia  No evidence of DKA  tolerating diet  Hemodynamics and respiratory function reasonable  No evidence of ongoing GI bleeding.    Plan at this time is for transfer to med surg floor  continue LA insulin and sliding scale  monitor for s/s of bleeding  repeat labs 19:00 hrs  PPI  diet  mobilize  GI input appreciated  supportive care 67yo M admitted with esophagitis and uncontrolled hyperglycemia  No evidence of DKA  tolerating diet  Hemodynamics and respiratory function reasonable  No evidence of ongoing GI bleeding.    Plan at this time is for transfer to med surg floor  continue LA insulin and sliding scale  monitor for s/s of bleeding  repeat labs 19:00 hrs  PPI  diet  mobilize  GI input appreciated  supportive care    D/w Dr ABDI Reid of the hospitalist service who will assume care for the patient.

## 2018-08-31 NOTE — CONSULT NOTE ADULT - ASSESSMENT
69 y/o male admitted for vomiting coffee ground emesis and DKA 2/2 DM.  Hx of MR, DM, seizures, HTN.     Impression:  CG emesis ?UGIB-none noted since hospitalized?   Esophagitis.   Stercoral colitis.    Plan:  Advance diet.  Continue IV PPI for now.  H/H stable.   No further emesis.   Check occult stool.   Ct scan/sonogram reviewed.   If pt continues to vomit, may consider GES to r/o gastroparesis as noted distended stomach.   Start miralax BID, and mineral oil enema for stercoral colitis.   Will continue to monitor closely if becomes symptomatic, will need EGD.   I am covering for Dr. Shields today, Dr. Shields to follow pt moving forward.     Discussed with Dr. Petersen, and Dr. Navarrete.
67M w/PMH of T2DM, Parkinson's, seizures, HTN, GERD, incontinence presents S/P hematemesis x1 this AM. Pt was noted to have a , bicarb 26, anion gap 15. ICU consult called to evaluate for DKA.    #T2DM  - Less likely DKA given bicarb of 26 and anion gap WNL  - S/P Humalog 5U, currently on IVF and insulin drip  - Recommend D/C insulin drip, starting Humulin R and Lantus now, F/U VBG  - Given recurrent episodes of hyperglycemia, recommend starting Lantus on discharge    #Hematemesis  - Hgb currently 13.3  - Recommend trending Hgb and workup for GI bleeds

## 2018-08-31 NOTE — PROGRESS NOTE ADULT - SUBJECTIVE AND OBJECTIVE BOX
67 yo M with hx. of HTN, mental retardation from group home, Type II Diabetes,  admitted  with hematemesis and dark stools.    Also noted to by hyperglycemic but no AG, HCO3 26.    Last week pt was at [facility] for uncontrolled diabetes and hyperkalemia - sent home on same dose metformin.            U/A negative, CXR clear, CT demonstrated esophagitis    : pt was treated with IV hydration and insulin infusion - now transitioned to lantus and sliding scale, d/w GI - no acute intervention planned, diet resumed.  Hemodynamics and respiratory function reasonable and Hgb stable at present.    Allergies    No Known Allergies        ICU Vital Signs Last 24 Hrs  T(C): 37 (31 Aug 2018 08:00), Max: 37.8 (30 Aug 2018 17:45)  T(F): 98.6 (31 Aug 2018 08:00), Max: 100.1 (30 Aug 2018 17:45)  HR: 122 (31 Aug 2018 11:00) (106 - 139)  BP: 108/80 (31 Aug 2018 11:00) (99/73 - 133/94)  BP(mean): 89 (31 Aug 2018 11:00) (82 - 107)  RR: 19 (31 Aug 2018 11:00) (16 - 27)  SpO2: 94% (31 Aug 2018 11:00) (92% - 100%)          I&O's Summary    30 Aug 2018 07:  -  31 Aug 2018 07:00  --------------------------------------------------------  IN: 1677 mL / OUT: 0 mL / NET: 1677 mL    31 Aug 2018 07:  -  31 Aug 2018 12:25  --------------------------------------------------------  IN: 440 mL / OUT: 0 mL / NET: 440 mL                              8.7    x     )-----------( x        ( 31 Aug 2018 10:56 )             x              140  |  108  |  45<H>  ----------------------------<  341<H>  4.3   |  24  |  0.83    Ca    8.0<L>      31 Aug 2018 10:56  Phos  2.1       Mg     1.6         TPro  6.7  /  Alb  3.2<L>  /  TBili  0.5  /  DBili  x   /  AST  14<L>  /  ALT  15  /  AlkPhos  62        CAPILLARY BLOOD GLUCOSE      POCT Blood Glucose.: 276 mg/dL (31 Aug 2018 06:13)  POCT Blood Glucose.: 154 mg/dL (31 Aug 2018 02:06)  POCT Blood Glucose.: 131 mg/dL (31 Aug 2018 00:39)  POCT Blood Glucose.: 103 mg/dL (30 Aug 2018 23:32)  POCT Blood Glucose.: 111 mg/dL (30 Aug 2018 22:15)  POCT Blood Glucose.: 169 mg/dL (30 Aug 2018 20:11)  POCT Blood Glucose.: 211 mg/dL (30 Aug 2018 19:00)  POCT Blood Glucose.: 266 mg/dL (30 Aug 2018 18:08)  POCT Blood Glucose.: 274 mg/dL (30 Aug 2018 17:00)  POCT Blood Glucose.: 306 mg/dL (30 Aug 2018 15:53)  POCT Blood Glucose.: 341 mg/dL (30 Aug 2018 14:38)  POCT Blood Glucose.: 386 mg/dL (30 Aug 2018 13:36)      LIVER FUNCTIONS - ( 30 Aug 2018 08:45 )  Alb: 3.2 g/dL / Pro: 6.7 gm/dL / ALK PHOS: 62 U/L / ALT: 15 U/L / AST: 14 U/L / GGT: x             PT/INR - ( 30 Aug 2018 08:45 )   PT: 12.2 sec;   INR: 1.13 ratio         PTT - ( 30 Aug 2018 08:45 )  PTT:34.9 sec      Urinalysis Basic - ( 30 Aug 2018 08:45 )    Color: Yellow / Appearance: Clear / S.010 / pH: x  Gluc: x / Ketone: Large  / Bili: Negative / Urobili: Negative mg/dL   Blood: x / Protein: Negative mg/dL / Nitrite: Negative   Leuk Esterase: Trace / RBC: Negative /HPF / WBC 0-2   Sq Epi: x / Non Sq Epi: Occasional / Bacteria: Occasional        MEDICATIONS  (STANDING):  ARIPiprazole 30 milliGRAM(s) Oral daily  carbidopa/levodopa  25/100 1 Tablet(s) Oral four times a day  dextrose 5% + sodium chloride 0.45%. 1000 milliLiter(s) (100 mL/Hr) IV Continuous <Continuous>  dextrose 5%. 1000 milliLiter(s) (50 mL/Hr) IV Continuous <Continuous>  dextrose 50% Injectable 50 milliLiter(s) IV Push every 15 minutes  dextrose 50% Injectable 25 milliLiter(s) IV Push every 15 minutes  diVALproex ER 1000 milliGRAM(s) Oral daily  diVALproex ER 1000 milliGRAM(s) Oral at bedtime  docusate sodium 100 milliGRAM(s) Oral two times a day  escitalopram 20 milliGRAM(s) Oral daily  insulin glargine Injectable (LANTUS) 20 Unit(s) SubCutaneous at bedtime  insulin Infusion 6 Unit(s)/Hr (6 mL/Hr) IV Continuous <Continuous>  insulin lispro (HumaLOG) corrective regimen sliding scale   SubCutaneous three times a day before meals  insulin lispro (HumaLOG) corrective regimen sliding scale   SubCutaneous at bedtime  levETIRAcetam 250 milliGRAM(s) Oral two times a day  mineral oil enema 133 milliLiter(s) Rectal once  oxybutynin 10 milliGRAM(s) Oral at bedtime  pantoprazole Infusion 8 mG/Hr (10 mL/Hr) IV Continuous <Continuous>  polyethylene glycol 3350 17 Gram(s) Oral two times a day  QUEtiapine 50 milliGRAM(s) Oral daily  rOPINIRole 0.75 milliGRAM(s) Oral three times a day  tamsulosin 0.4 milliGRAM(s) Oral at bedtime    MEDICATIONS  (PRN):  dextrose 40% Gel 15 Gram(s) Oral once PRN Blood Glucose LESS THAN 70 milliGRAM(s)/deciliter  glucagon  Injectable 1 milliGRAM(s) IntraMuscular once PRN Glucose LESS THAN 70 milligrams/deciliter 67 yo M with hx. of HTN, mental retardation from group home, Type II Diabetes,  admitted  with hematemesis and dark stools.    Also noted to by hyperglycemic but no AG, HCO3 26.    Last week pt was at [facility] for uncontrolled diabetes and hyperkalemia - sent home on same dose metformin.            U/A negative, CXR clear, CT demonstrated esophagitis    : pt was treated with IV hydration and insulin infusion - now transitioned to lantus and sliding scale, d/w GI - no acute intervention planned, diet resumed.  Hemodynamics and respiratory function reasonable and Hgb stable at present.    Allergies    No Known Allergies        ICU Vital Signs Last 24 Hrs  T(C): 37 (31 Aug 2018 08:00), Max: 37.8 (30 Aug 2018 17:45)  T(F): 98.6 (31 Aug 2018 08:00), Max: 100.1 (30 Aug 2018 17:45)  HR: 122 (31 Aug 2018 11:00) (106 - 139)  BP: 108/80 (31 Aug 2018 11:00) (99/73 - 133/94)  BP(mean): 89 (31 Aug 2018 11:00) (82 - 107)  RR: 19 (31 Aug 2018 11:00) (16 - 27)  SpO2: 94% (31 Aug 2018 11:00) (92% - 100%)          I&O's Summary    30 Aug 2018 07:  -  31 Aug 2018 07:00  --------------------------------------------------------  IN: 1677 mL / OUT: 0 mL / NET: 1677 mL    31 Aug 2018 07:  -  31 Aug 2018 12:25  --------------------------------------------------------  IN: 440 mL / OUT: 0 mL / NET: 440 mL                              8.7    x     )-----------( x        ( 31 Aug 2018 10:56 )             x              140  |  108  |  45<H>  ----------------------------<  341<H>  4.3   |  24  |  0.83    Ca    8.0<L>      31 Aug 2018 10:56  Phos  2.1       Mg     1.6         TPro  6.7  /  Alb  3.2<L>  /  TBili  0.5  /  DBili  x   /  AST  14<L>  /  ALT  15  /  AlkPhos  62        CAPILLARY BLOOD GLUCOSE      POCT Blood Glucose.: 276 mg/dL (31 Aug 2018 06:13)  POCT Blood Glucose.: 154 mg/dL (31 Aug 2018 02:06)  POCT Blood Glucose.: 131 mg/dL (31 Aug 2018 00:39)  POCT Blood Glucose.: 103 mg/dL (30 Aug 2018 23:32)  POCT Blood Glucose.: 111 mg/dL (30 Aug 2018 22:15)  POCT Blood Glucose.: 169 mg/dL (30 Aug 2018 20:11)  POCT Blood Glucose.: 211 mg/dL (30 Aug 2018 19:00)  POCT Blood Glucose.: 266 mg/dL (30 Aug 2018 18:08)  POCT Blood Glucose.: 274 mg/dL (30 Aug 2018 17:00)  POCT Blood Glucose.: 306 mg/dL (30 Aug 2018 15:53)  POCT Blood Glucose.: 341 mg/dL (30 Aug 2018 14:38)  POCT Blood Glucose.: 386 mg/dL (30 Aug 2018 13:36)      LIVER FUNCTIONS - ( 30 Aug 2018 08:45 )  Alb: 3.2 g/dL / Pro: 6.7 gm/dL / ALK PHOS: 62 U/L / ALT: 15 U/L / AST: 14 U/L / GGT: x             PT/INR - ( 30 Aug 2018 08:45 )   PT: 12.2 sec;   INR: 1.13 ratio         PTT - ( 30 Aug 2018 08:45 )  PTT:34.9 sec      Urinalysis Basic - ( 30 Aug 2018 08:45 )    Color: Yellow / Appearance: Clear / S.010 / pH: x  Gluc: x / Ketone: Large  / Bili: Negative / Urobili: Negative mg/dL   Blood: x / Protein: Negative mg/dL / Nitrite: Negative   Leuk Esterase: Trace / RBC: Negative /HPF / WBC 0-2   Sq Epi: x / Non Sq Epi: Occasional / Bacteria: Occasional        MEDICATIONS  (STANDING):  ARIPiprazole 30 milliGRAM(s) Oral daily  carbidopa/levodopa  25/100 1 Tablet(s) Oral four times a day  dextrose 5% + sodium chloride 0.45%. 1000 milliLiter(s) (100 mL/Hr) IV Continuous <Continuous>  dextrose 5%. 1000 milliLiter(s) (50 mL/Hr) IV Continuous <Continuous>  dextrose 50% Injectable 50 milliLiter(s) IV Push every 15 minutes  dextrose 50% Injectable 25 milliLiter(s) IV Push every 15 minutes  diVALproex ER 1000 milliGRAM(s) Oral daily  diVALproex ER 1000 milliGRAM(s) Oral at bedtime  docusate sodium 100 milliGRAM(s) Oral two times a day  escitalopram 20 milliGRAM(s) Oral daily  insulin glargine Injectable (LANTUS) 20 Unit(s) SubCutaneous at bedtime  insulin Infusion 6 Unit(s)/Hr (6 mL/Hr) IV Continuous <Continuous>  insulin lispro (HumaLOG) corrective regimen sliding scale   SubCutaneous three times a day before meals  insulin lispro (HumaLOG) corrective regimen sliding scale   SubCutaneous at bedtime  levETIRAcetam 250 milliGRAM(s) Oral two times a day  mineral oil enema 133 milliLiter(s) Rectal once  oxybutynin 10 milliGRAM(s) Oral at bedtime  pantoprazole Infusion 8 mG/Hr (10 mL/Hr) IV Continuous <Continuous>  polyethylene glycol 3350 17 Gram(s) Oral two times a day  QUEtiapine 50 milliGRAM(s) Oral daily  rOPINIRole 0.75 milliGRAM(s) Oral three times a day  tamsulosin 0.4 milliGRAM(s) Oral at bedtime    MEDICATIONS  (PRN):  dextrose 40% Gel 15 Gram(s) Oral once PRN Blood Glucose LESS THAN 70 milliGRAM(s)/deciliter  glucagon  Injectable 1 milliGRAM(s) IntraMuscular once PRN Glucose LESS THAN 70 milligrams/deciliter        DVT prophylaxis: Chemoprophy contra due to GI bleeding, venodynes.

## 2018-08-31 NOTE — CONSULT NOTE ADULT - SUBJECTIVE AND OBJECTIVE BOX
Patient is a 68y old  Male who presents with a chief complaint of vomiting coffee grounds, poor po intact (30 Aug 2018 15:44)    HPI:  see consult note  Patient with MR vomitting with poorly controlled diabetes, admitted uncontrolled blood sugars, DKA worsening. (30 Aug 2018 15:44)    8/31/2018-  Pt with .   Answers yes and no to questions asked.  States yes to hunger, no to abdominal pain.  Aid at bedside overnight reports no emesis.     PAST MEDICAL & SURGICAL HISTORY:  Hypertension  Seizures  Parkinson disease  H/O foot surgery    MEDICATIONS  (STANDING):  ARIPiprazole 30 milliGRAM(s) Oral daily  carbidopa/levodopa  25/100 1 Tablet(s) Oral four times a day  dextrose 5% + sodium chloride 0.45%. 1000 milliLiter(s) (100 mL/Hr) IV Continuous <Continuous>  dextrose 5%. 1000 milliLiter(s) (50 mL/Hr) IV Continuous <Continuous>  dextrose 50% Injectable 50 milliLiter(s) IV Push every 15 minutes  dextrose 50% Injectable 25 milliLiter(s) IV Push every 15 minutes  diVALproex ER 1000 milliGRAM(s) Oral daily  diVALproex ER 1000 milliGRAM(s) Oral at bedtime  docusate sodium 100 milliGRAM(s) Oral two times a day  escitalopram 20 milliGRAM(s) Oral daily  insulin glargine Injectable (LANTUS) 20 Unit(s) SubCutaneous at bedtime  insulin glargine Injectable (LANTUS) 20 Unit(s) SubCutaneous once  insulin Infusion 6 Unit(s)/Hr (6 mL/Hr) IV Continuous <Continuous>  insulin lispro (HumaLOG) corrective regimen sliding scale   SubCutaneous every 6 hours  levETIRAcetam 250 milliGRAM(s) Oral two times a day  mineral oil enema 133 milliLiter(s) Rectal once  oxybutynin 10 milliGRAM(s) Oral at bedtime  pantoprazole Infusion 8 mG/Hr (10 mL/Hr) IV Continuous <Continuous>  polyethylene glycol 3350 17 Gram(s) Oral two times a day  QUEtiapine 50 milliGRAM(s) Oral daily  rOPINIRole 0.75 milliGRAM(s) Oral three times a day  tamsulosin 0.4 milliGRAM(s) Oral at bedtime    MEDICATIONS  (PRN):  dextrose 40% Gel 15 Gram(s) Oral once PRN Blood Glucose LESS THAN 70 milliGRAM(s)/deciliter  glucagon  Injectable 1 milliGRAM(s) IntraMuscular once PRN Glucose LESS THAN 70 milligrams/deciliter    Allergies  No Known Allergies    FAMILY HISTORY:  No pertinent family history in first degree relatives    REVIEW OF SYSTEMS:  Difficulty to obtain accurate information 2/2   Does answer yes and no to questions asked.     Vital Signs Last 24 Hrs  T(C): 37 (31 Aug 2018 08:00), Max: 37.8 (30 Aug 2018 17:45)  T(F): 98.6 (31 Aug 2018 08:00), Max: 100.1 (30 Aug 2018 17:45)  HR: 124 (31 Aug 2018 09:00) (106 - 139)  BP: 122/88 (31 Aug 2018 09:00) (99/73 - 133/94)  BP(mean): 97 (31 Aug 2018 09:00) (82 - 107)  RR: 27 (31 Aug 2018 09:00) (16 - 27)  SpO2: 98% (31 Aug 2018 09:00) (92% - 100%)    PHYSICAL EXAM:  Constitutional: Pt with MR, appears comfortable, in nad.   Respiratory: CTAB  Cardiovascular: S1 and S2, RRR, no M/G/R  Gastrointestinal: BS+, soft, NT/ND    LABS:                        8.7    14.39 )-----------( 169      ( 31 Aug 2018 06:00 )             25.9     08-31    141  |  109<H>  |  43<H>  ----------------------------<  271<H>  4.3   |  23  |  0.67    Ca    7.6<L>      31 Aug 2018 06:00    TPro  6.7  /  Alb  3.2<L>  /  TBili  0.5  /  DBili  x   /  AST  14<L>  /  ALT  15  /  AlkPhos  62  08-30    PT/INR - ( 30 Aug 2018 08:45 )   PT: 12.2 sec;   INR: 1.13 ratio         PTT - ( 30 Aug 2018 08:45 )  PTT:34.9 sec  LIVER FUNCTIONS - ( 30 Aug 2018 08:45 )  Alb: 3.2 g/dL / Pro: 6.7 gm/dL / ALK PHOS: 62 U/L / ALT: 15 U/L / AST: 14 U/L / GGT: x             RADIOLOGY & ADDITIONAL STUDIES:
67M w/PMH of T2DM, Parkinson's, seizures, HTN, GERD, incontinence presents S/P hematemesis x1 this AM. Pt is AOx0, hx from bedside aid and facility nurse via phone. Pt was found to have "prune juice" colored emesis this AM. Pt was not noted to have any other recent symptoms, aid denies pt weakness, previous emesis, fevers. Hx significant for admission last Tuesday to [facility] for elevated blood glucose in the 400s. Pt was given insulin while inpatient, but was not discharged on outpt insulin. Diabetes regiment currently only metformin 1000mg bid.     In the ED, pt was noted to have a , bicarb 26, anion gap 15. Pt received Humalog 5U and started on IVF and insulin drip. Pt tachycardic, otherwise VSS. Consult called to evaluate for DKA.    REVIEW OF SYSTEMS:  Unable to obtain due to pt status    PHYSICAL EXAM:  Vital Signs Last 24 Hrs  T(C): 36.8 (30 Aug 2018 11:31), Max: 37.3 (30 Aug 2018 09:51)  T(F): 98.3 (30 Aug 2018 11:31), Max: 99.1 (30 Aug 2018 09:51)  HR: 132 (30 Aug 2018 11:55) (110 - 132)  BP: 108/67 (30 Aug 2018 11:55) (108/67 - 121/90)  BP(mean): --  RR: 18 (30 Aug 2018 11:55) (18 - 19)  SpO2: 96% (30 Aug 2018 11:55) (96% - 100%)    Constitutional: Pt lying in bed, awake, NAD  HEENT: EOMI, normal hearing, dark staining around lips  Neck: Soft and supple, no JVD  Respiratory: CTABL, No wheezing, rales or rhonchi  Cardiovascular: S1S2+, tachycardic, no M/G/R  Gastrointestinal: BS+, soft, NT/ND, no guarding, no rebound  Extremities: B/L LE edema 1+  Vascular: 2+ peripheral pulses  Neurological: AAOx0, no focal deficits. Resting tremor.  Musculoskeletal: 5/5 strength b/l upper and lower extremities  Skin: No rashes    MEDICATIONS:  MEDICATIONS  (STANDING):  dextrose 50% Injectable 50 milliLiter(s) IV Push every 15 minutes  dextrose 50% Injectable 25 milliLiter(s) IV Push every 15 minutes  insulin Infusion 6 Unit(s)/Hr (6 mL/Hr) IV Continuous <Continuous>  pantoprazole Infusion 8 mG/Hr (10 mL/Hr) IV Continuous <Continuous>  sodium chloride 0.9%. 1000 milliLiter(s) (1000 mL/Hr) IV Continuous <Continuous>      LABS: All Labs Reviewed:                        13.3   16.18 )-----------( 189      ( 30 Aug 2018 08:45 )             39.4     08-30    136  |  95<L>  |  19  ----------------------------<  343<H>  4.0   |  26  |  0.89    Ca    9.4      30 Aug 2018 08:45    TPro  6.7  /  Alb  3.2<L>  /  TBili  0.5  /  DBili  x   /  AST  14<L>  /  ALT  15  /  AlkPhos  62  08-30    PT/INR - ( 30 Aug 2018 08:45 )   PT: 12.2 sec;   INR: 1.13 ratio         PTT - ( 30 Aug 2018 08:45 )  PTT:34.9 sec      POCT Blood Glucose.: 358 mg/dL (30 Aug 2018 12:11)      RADIOLOGY/EKG:  < from: CT Abdomen and Pelvis w/ IV Cont (08.30.18 @ 09:54) >  IMPRESSION:     Suspect severe distal esophagitis and reflux. Distended stomach,   correlate for gastroparesis.    Severe rectosigmoid fecal impaction and probable stercoral colitis.    < end of copied text >

## 2018-09-01 LAB
ANION GAP SERPL CALC-SCNC: 8 MMOL/L — SIGNIFICANT CHANGE UP (ref 5–17)
BLD GP AB SCN SERPL QL: SIGNIFICANT CHANGE UP
BUN SERPL-MCNC: 38 MG/DL — HIGH (ref 7–23)
CALCIUM SERPL-MCNC: 8.5 MG/DL — SIGNIFICANT CHANGE UP (ref 8.5–10.1)
CHLORIDE SERPL-SCNC: 112 MMOL/L — HIGH (ref 96–108)
CO2 SERPL-SCNC: 26 MMOL/L — SIGNIFICANT CHANGE UP (ref 22–31)
CREAT SERPL-MCNC: 0.77 MG/DL — SIGNIFICANT CHANGE UP (ref 0.5–1.3)
GLUCOSE SERPL-MCNC: 126 MG/DL — HIGH (ref 70–99)
HCT VFR BLD CALC: 22.5 % — LOW (ref 39–50)
HCT VFR BLD CALC: 22.9 % — LOW (ref 39–50)
HCT VFR BLD CALC: 23.5 % — LOW (ref 39–50)
HGB BLD-MCNC: 7.4 G/DL — LOW (ref 13–17)
HGB BLD-MCNC: 7.6 G/DL — LOW (ref 13–17)
HGB BLD-MCNC: 7.7 G/DL — LOW (ref 13–17)
MAGNESIUM SERPL-MCNC: 1.9 MG/DL — SIGNIFICANT CHANGE UP (ref 1.6–2.6)
MCHC RBC-ENTMCNC: 31.5 PG — SIGNIFICANT CHANGE UP (ref 27–34)
MCHC RBC-ENTMCNC: 31.8 PG — SIGNIFICANT CHANGE UP (ref 27–34)
MCHC RBC-ENTMCNC: 32.3 PG — SIGNIFICANT CHANGE UP (ref 27–34)
MCHC RBC-ENTMCNC: 32.8 GM/DL — SIGNIFICANT CHANGE UP (ref 32–36)
MCHC RBC-ENTMCNC: 32.9 GM/DL — SIGNIFICANT CHANGE UP (ref 32–36)
MCHC RBC-ENTMCNC: 33.2 GM/DL — SIGNIFICANT CHANGE UP (ref 32–36)
MCV RBC AUTO: 95.7 FL — SIGNIFICANT CHANGE UP (ref 80–100)
MCV RBC AUTO: 97.1 FL — SIGNIFICANT CHANGE UP (ref 80–100)
MCV RBC AUTO: 97.4 FL — SIGNIFICANT CHANGE UP (ref 80–100)
NRBC # BLD: 0 /100 WBCS — SIGNIFICANT CHANGE UP (ref 0–0)
PHOSPHATE SERPL-MCNC: 2.3 MG/DL — LOW (ref 2.5–4.5)
PLATELET # BLD AUTO: 139 K/UL — LOW (ref 150–400)
PLATELET # BLD AUTO: 142 K/UL — LOW (ref 150–400)
PLATELET # BLD AUTO: 150 K/UL — SIGNIFICANT CHANGE UP (ref 150–400)
POTASSIUM SERPL-MCNC: 3.5 MMOL/L — SIGNIFICANT CHANGE UP (ref 3.5–5.3)
POTASSIUM SERPL-SCNC: 3.5 MMOL/L — SIGNIFICANT CHANGE UP (ref 3.5–5.3)
RBC # BLD: 2.35 M/UL — LOW (ref 4.2–5.8)
RBC # BLD: 2.35 M/UL — LOW (ref 4.2–5.8)
RBC # BLD: 2.42 M/UL — LOW (ref 4.2–5.8)
RBC # FLD: 13.7 % — SIGNIFICANT CHANGE UP (ref 10.3–14.5)
RBC # FLD: 13.7 % — SIGNIFICANT CHANGE UP (ref 10.3–14.5)
RBC # FLD: 13.9 % — SIGNIFICANT CHANGE UP (ref 10.3–14.5)
SODIUM SERPL-SCNC: 146 MMOL/L — HIGH (ref 135–145)
TYPE + AB SCN PNL BLD: SIGNIFICANT CHANGE UP
WBC # BLD: 8.57 K/UL — SIGNIFICANT CHANGE UP (ref 3.8–10.5)
WBC # BLD: 8.59 K/UL — SIGNIFICANT CHANGE UP (ref 3.8–10.5)
WBC # BLD: 8.87 K/UL — SIGNIFICANT CHANGE UP (ref 3.8–10.5)
WBC # FLD AUTO: 8.57 K/UL — SIGNIFICANT CHANGE UP (ref 3.8–10.5)
WBC # FLD AUTO: 8.59 K/UL — SIGNIFICANT CHANGE UP (ref 3.8–10.5)
WBC # FLD AUTO: 8.87 K/UL — SIGNIFICANT CHANGE UP (ref 3.8–10.5)

## 2018-09-01 PROCEDURE — 71045 X-RAY EXAM CHEST 1 VIEW: CPT | Mod: 26

## 2018-09-01 RX ORDER — SODIUM,POTASSIUM PHOSPHATES 278-250MG
1 POWDER IN PACKET (EA) ORAL ONCE
Qty: 0 | Refills: 0 | Status: COMPLETED | OUTPATIENT
Start: 2018-09-01 | End: 2018-09-01

## 2018-09-01 RX ADMIN — ARIPIPRAZOLE 30 MILLIGRAM(S): 15 TABLET ORAL at 11:48

## 2018-09-01 RX ADMIN — LEVETIRACETAM 250 MILLIGRAM(S): 250 TABLET, FILM COATED ORAL at 17:44

## 2018-09-01 RX ADMIN — ROPINIROLE 0.75 MILLIGRAM(S): 8 TABLET, FILM COATED, EXTENDED RELEASE ORAL at 05:33

## 2018-09-01 RX ADMIN — POLYETHYLENE GLYCOL 3350 17 GRAM(S): 17 POWDER, FOR SOLUTION ORAL at 17:43

## 2018-09-01 RX ADMIN — DIVALPROEX SODIUM 1000 MILLIGRAM(S): 500 TABLET, DELAYED RELEASE ORAL at 14:38

## 2018-09-01 RX ADMIN — ESCITALOPRAM OXALATE 20 MILLIGRAM(S): 10 TABLET, FILM COATED ORAL at 11:49

## 2018-09-01 RX ADMIN — ROPINIROLE 0.75 MILLIGRAM(S): 8 TABLET, FILM COATED, EXTENDED RELEASE ORAL at 21:25

## 2018-09-01 RX ADMIN — Medication 1 TABLET(S): at 05:32

## 2018-09-01 RX ADMIN — Medication 10 MILLIGRAM(S): at 03:31

## 2018-09-01 RX ADMIN — ROPINIROLE 0.75 MILLIGRAM(S): 8 TABLET, FILM COATED, EXTENDED RELEASE ORAL at 14:39

## 2018-09-01 RX ADMIN — PANTOPRAZOLE SODIUM 10 MG/HR: 20 TABLET, DELAYED RELEASE ORAL at 18:32

## 2018-09-01 RX ADMIN — QUETIAPINE FUMARATE 50 MILLIGRAM(S): 200 TABLET, FILM COATED ORAL at 11:50

## 2018-09-01 RX ADMIN — TAMSULOSIN HYDROCHLORIDE 0.4 MILLIGRAM(S): 0.4 CAPSULE ORAL at 21:25

## 2018-09-01 RX ADMIN — CARBIDOPA AND LEVODOPA 1 TABLET(S): 25; 100 TABLET ORAL at 14:39

## 2018-09-01 RX ADMIN — Medication 100 MILLIGRAM(S): at 05:34

## 2018-09-01 RX ADMIN — POLYETHYLENE GLYCOL 3350 17 GRAM(S): 17 POWDER, FOR SOLUTION ORAL at 05:34

## 2018-09-01 RX ADMIN — DIVALPROEX SODIUM 1000 MILLIGRAM(S): 500 TABLET, DELAYED RELEASE ORAL at 22:07

## 2018-09-01 RX ADMIN — DIVALPROEX SODIUM 1000 MILLIGRAM(S): 500 TABLET, DELAYED RELEASE ORAL at 00:12

## 2018-09-01 RX ADMIN — CARBIDOPA AND LEVODOPA 1 TABLET(S): 25; 100 TABLET ORAL at 17:44

## 2018-09-01 RX ADMIN — INSULIN GLARGINE 20 UNIT(S): 100 INJECTION, SOLUTION SUBCUTANEOUS at 21:25

## 2018-09-01 RX ADMIN — Medication 5 MILLIGRAM(S): at 15:09

## 2018-09-01 RX ADMIN — INSULIN GLARGINE 20 UNIT(S): 100 INJECTION, SOLUTION SUBCUTANEOUS at 08:31

## 2018-09-01 RX ADMIN — Medication 2: at 17:43

## 2018-09-01 RX ADMIN — Medication 100 MILLIGRAM(S): at 17:43

## 2018-09-01 RX ADMIN — CARBIDOPA AND LEVODOPA 1 TABLET(S): 25; 100 TABLET ORAL at 11:47

## 2018-09-01 RX ADMIN — CARBIDOPA AND LEVODOPA 1 TABLET(S): 25; 100 TABLET ORAL at 05:33

## 2018-09-01 RX ADMIN — PANTOPRAZOLE SODIUM 10 MG/HR: 20 TABLET, DELAYED RELEASE ORAL at 07:38

## 2018-09-01 RX ADMIN — LEVETIRACETAM 250 MILLIGRAM(S): 250 TABLET, FILM COATED ORAL at 05:32

## 2018-09-01 RX ADMIN — Medication 10 MILLIGRAM(S): at 21:25

## 2018-09-01 NOTE — PROGRESS NOTE ADULT - SUBJECTIVE AND OBJECTIVE BOX
Patient is a 68y old  Male who presents with a chief complaint of vomiting coffee grounds, poor po intact       SUBJECTIVE:   HPI:  see consult note  Patient with moderate MR admitted with uncontrolled diabetes and hematemesis.   Pt was treated in ICU with insulin gtt. No evidence of DKA. No AG. PT sugars now better controlled.   CTAP c/w stercoral colitis/esophagitis  No GI intervention planned      9/1: pt complaining of cough/congestion. Hgb dropped from 837 to 7.7. No melena or hematochezia or hematemesis o/n.           REVIEW OF SYSTEMS:    CONSTITUTIONAL: No weakness, fevers or chills  EYES/ENT: No visual changes;  No vertigo or throat pain   NECK: No pain or stiffness  RESPIRATORY: No cough, wheezing, hemoptysis; No shortness of breath  CARDIOVASCULAR: No chest pain or palpitations  GASTROINTESTINAL: No abdominal or epigastric pain. No nausea, vomiting, or hematemesis; No diarrhea or constipation. No melena or hematochezia.  GENITOURINARY: No dysuria, frequency or hematuria  NEUROLOGICAL: No numbness or weakness  SKIN: No itching, burning, rashes, or lesions   All other review of systems is negative unless indicated above      ICU Vital Signs Last 24 Hrs  T(C): 36.7 (01 Sep 2018 10:53), Max: 36.8 (31 Aug 2018 16:37)  T(F): 98 (01 Sep 2018 10:53), Max: 98.2 (31 Aug 2018 16:37)  HR: 94 (01 Sep 2018 10:53) (91 - 119)  BP: 118/74 (01 Sep 2018 10:53) (94/61 - 125/75)  BP(mean): 94 (31 Aug 2018 16:00) (75 - 94)  ABP: --  ABP(mean): --  RR: 16 (01 Sep 2018 10:53) (16 - 26)  SpO2: 96% (01 Sep 2018 10:53) (92% - 96%)        POCT Blood Glucose.: 132 mg/dL (01 Sep 2018 11:57)  POCT Blood Glucose.: 128 mg/dL (01 Sep 2018 08:05)  POCT Blood Glucose.: 230 mg/dL (31 Aug 2018 22:14)  POCT Blood Glucose.: 286 mg/dL (31 Aug 2018 17:46)  POCT Blood Glucose.: 345 mg/dL (31 Aug 2018 16:04)      PHYSICAL EXAM:    Constitutional: NAD, awake  HEENT: PERR, EOMI, Normal Hearing, MMM  Neck: Soft and supple, No LAD, No JVD  Respiratory: Breath sounds are clear bilaterally, No wheezing, rales or rhonchi  Cardiovascular: S1 and S2, regular rate and rhythm, no Murmurs, gallops or rubs  Gastrointestinal: Bowel Sounds present, soft, mildly distended, BS in all 4 quadrants  Extremities: No peripheral edema  Vascular: 2+ peripheral pulses  Neurological: A/O x 3, no focal deficits  Musculoskeletal: 5/5 strength b/l upper and lower extremities  Skin: No rashes    MEDICATIONS:  MEDICATIONS  (STANDING):  ARIPiprazole 30 milliGRAM(s) Oral daily  carbidopa/levodopa  25/100 1 Tablet(s) Oral four times a day  dextrose 5%. 1000 milliLiter(s) (50 mL/Hr) IV Continuous <Continuous>  dextrose 50% Injectable 50 milliLiter(s) IV Push every 15 minutes  dextrose 50% Injectable 25 milliLiter(s) IV Push every 15 minutes  diVALproex ER 1000 milliGRAM(s) Oral daily  diVALproex ER 1000 milliGRAM(s) Oral at bedtime  docusate sodium 100 milliGRAM(s) Oral two times a day  escitalopram 20 milliGRAM(s) Oral daily  insulin glargine Injectable (LANTUS) 20 Unit(s) SubCutaneous every morning  insulin glargine Injectable (LANTUS) 20 Unit(s) SubCutaneous at bedtime  insulin lispro (HumaLOG) corrective regimen sliding scale   SubCutaneous three times a day before meals  insulin lispro (HumaLOG) corrective regimen sliding scale   SubCutaneous at bedtime  levETIRAcetam 250 milliGRAM(s) Oral two times a day  oxybutynin 10 milliGRAM(s) Oral at bedtime  pantoprazole Infusion 8 mG/Hr (10 mL/Hr) IV Continuous <Continuous>  polyethylene glycol 3350 17 Gram(s) Oral two times a day  QUEtiapine 50 milliGRAM(s) Oral daily  rOPINIRole 0.75 milliGRAM(s) Oral three times a day  tamsulosin 0.4 milliGRAM(s) Oral at bedtime      LABS: All Labs Reviewed:                        7.7    8.87  )-----------( 142      ( 01 Sep 2018 07:31 )             23.5     09-01    146<H>  |  112<H>  |  38<H>  ----------------------------<  126<H>  3.5   |  26  |  0.77    Ca    8.5      01 Sep 2018 07:31  Phos  2.3     09-01  Mg     1.9     09-01                Blood Culture: 08-30 @ 08:45  Organism --  Gram Stain Blood -- Gram Stain --  Specimen Source .Urine None  Culture-Blood --        RADIOLOGY/EKG:    < from: CT Abdomen and Pelvis w/ IV Cont (08.30.18 @ 09:54) >    IMPRESSION:     Suspect severe distal esophagitis and reflux. Distended stomach,   correlate for gastroparesis.    < end of copied text >

## 2018-09-01 NOTE — PROGRESS NOTE ADULT - ASSESSMENT
Patient of Dr pruett  Anemia–drop in hematocrit noted, serial H&H's    Esophagitis by CT scan would continue treatment with clinical follow-up, no evidence of blood in stool.  Abdominal distention if he does not have a bowel movement, would consider laxatives.

## 2018-09-01 NOTE — PROGRESS NOTE ADULT - SUBJECTIVE AND OBJECTIVE BOX
Patient is a 68y old  Male who presents with a chief complaint of vomiting coffee grounds, poor po intact (30 Aug 2018 15:44)      HPI:    Patient with MR vomitting with poorly controlled diabetes, admitted uncontrolled blood sugars, DKA worsening. (30 Aug 2018 15:44)    History per patient, aid and RN  Denies any abdominal pain, loose BM. Negative blood in stool.  Negative chest pain  poor historian    PAST MEDICAL & SURGICAL HISTORY:  Hypertension  Seizures  Parkinson disease  H/O foot surgery      MEDICATIONS  (STANDING):  ARIPiprazole 30 milliGRAM(s) Oral daily  carbidopa/levodopa  25/100 1 Tablet(s) Oral four times a day  dextrose 5%. 1000 milliLiter(s) (50 mL/Hr) IV Continuous <Continuous>  dextrose 50% Injectable 50 milliLiter(s) IV Push every 15 minutes  dextrose 50% Injectable 25 milliLiter(s) IV Push every 15 minutes  diVALproex ER 1000 milliGRAM(s) Oral daily  diVALproex ER 1000 milliGRAM(s) Oral at bedtime  docusate sodium 100 milliGRAM(s) Oral two times a day  escitalopram 20 milliGRAM(s) Oral daily  insulin glargine Injectable (LANTUS) 20 Unit(s) SubCutaneous every morning  insulin glargine Injectable (LANTUS) 20 Unit(s) SubCutaneous at bedtime  insulin lispro (HumaLOG) corrective regimen sliding scale   SubCutaneous three times a day before meals  insulin lispro (HumaLOG) corrective regimen sliding scale   SubCutaneous at bedtime  levETIRAcetam 250 milliGRAM(s) Oral two times a day  oxybutynin 10 milliGRAM(s) Oral at bedtime  pantoprazole Infusion 8 mG/Hr (10 mL/Hr) IV Continuous <Continuous>  polyethylene glycol 3350 17 Gram(s) Oral two times a day  QUEtiapine 50 milliGRAM(s) Oral daily  rOPINIRole 0.75 milliGRAM(s) Oral three times a day  tamsulosin 0.4 milliGRAM(s) Oral at bedtime    MEDICATIONS  (PRN):  dextrose 40% Gel 15 Gram(s) Oral once PRN Blood Glucose LESS THAN 70 milliGRAM(s)/deciliter  glucagon  Injectable 1 milliGRAM(s) IntraMuscular once PRN Glucose LESS THAN 70 milligrams/deciliter      Allergies    No Known Allergies    Intolerances        SOCIAL HISTORY:NC    FAMILY HISTORY:  No pertinent family history in first degree relatives      REVIEW OF SYSTEMS:    CONSTITUTIONAL: No weakness, fevers or chills  EYES/ENT: No visual changes;  No vertigo or throat pain   NECK: No pain or stiffness  RESPIRATORY: No cough, wheezing, hemoptysis; No shortness of breath  CARDIOVASCULAR: No chest pain or palpitations  GENITOURINARY: No dysuria, frequency or hematuria  NEUROLOGICAL: No numbness or weakness  SKIN: No itching, burning, rashes, or lesions   All other review of systems is negative unless indicated above.    Vital Signs Last 24 Hrs  T(C): 36.7 (01 Sep 2018 10:53), Max: 36.8 (31 Aug 2018 16:37)  T(F): 98 (01 Sep 2018 10:53), Max: 98.2 (31 Aug 2018 16:37)  HR: 94 (01 Sep 2018 10:53) (91 - 119)  BP: 118/74 (01 Sep 2018 10:53) (94/61 - 125/75)  BP(mean): 94 (31 Aug 2018 16:00) (75 - 94)  RR: 16 (01 Sep 2018 10:53) (16 - 26)  SpO2: 96% (01 Sep 2018 10:53) (92% - 96%)    PHYSICAL EXAM:    Constitutional: NAD, well-developed  HEENT: EOMI, throat clear  Neck: No LAD, supple  Respiratory: CTA and P  Cardiovascular: S1 and S2, RRR, no M  Gastrointestinal: BS+, soft, NT/distended and tympanitic, neg HSM,  Extremities: No peripheral edema, neg clubing, cyanosis  Vascular: 2+ peripheral pulses  Neurological: A/O x 3, no focal deficits  Psychiatric: Normal mood, normal affect  Skin: No rashes    LABS:  CBC Full  -  ( 01 Sep 2018 07:31 )  WBC Count : 8.87 K/uL  Hemoglobin : 7.7 g/dL  Hematocrit : 23.5 %  Platelet Count - Automated : 142 K/uL  Mean Cell Volume : 97.1 fl  Mean Cell Hemoglobin : 31.8 pg  Mean Cell Hemoglobin Concentration : 32.8 gm/dL  Auto Neutrophil # : x  Auto Lymphocyte # : x  Auto Monocyte # : x  Auto Eosinophil # : x  Auto Basophil # : x  Auto Neutrophil % : x  Auto Lymphocyte % : x  Auto Monocyte % : x  Auto Eosinophil % : x  Auto Basophil % : x    09-01    146<H>  |  112<H>  |  38<H>  ----------------------------<  126<H>  3.5   |  26  |  0.77    Ca    8.5      01 Sep 2018 07:31  Phos  2.3     09-01  Mg     1.9     09-01              RADIOLOGY & ADDITIONAL STUDIES:  < from: CT Abdomen and Pelvis w/ IV Cont (08.30.18 @ 09:54) >  EXAM:  CT ABDOMEN AND PELVIS IC                            PROCEDURE DATE:  08/30/2018          INTERPRETATION:  Clinical information: Abdominal pain, nausea and vomiting    Comparison: 2017    PROCEDURE:   CT of the Abdomen and Pelvis was performed with intravenous contrast.  90ml of Omnipaque 350 was injected intravenously. 10cc was discarded.    FINDINGS:    LOWER CHEST: Thickened fluid distended distal esophagus with a small   hiatal hernia.    ABDOMEN:  LIVER: Subcentimeter hypodensities.  BILE DUCTS: Normal caliber  GALLBLADDER: No calcified gallstones. Normal caliber wall  PANCREAS: Within normal limits  SPLEEN: Within normal limits  ADRENALS: Within normal limits  KIDNEYS: 15 cm left renal cyst and bilateral smaller cysts.    PELVIS:  REPRODUCTIVE ORGANS: No pelvic masses  BLADDER: Within normal limits    PERITONEUM:No ascites, no free air.  BOWEL: Severe rectosigmoid fecal impaction. Distended stomach. No small   bowel obstruction.  VESSELS: Within normal limits  RETROPERITONEUM: No retroperitoneal or pelvic adenopathy  ABDOMINAL WALL: Within normal limits  MUSCULOSKELETAL: Multiple spinal compression fractures. Left hip   orthopedic hardware.    IMPRESSION:     Suspect severe distal esophagitis and reflux. Distended stomach,   correlate for gastroparesis.    Severe rectosigmoid fecal impaction and probable stercoral colitis.                    < end of copied text >

## 2018-09-01 NOTE — CDI QUERY NOTE - NSCDIOTHERTXTBX2_GEN_ALL_CORE_FT
Patient with mental retardation  Per nursing patient is incontinent, needs assist, cognitive limitations,  alert and orientated, follows commands but confused.    Please clarify the suspected degree of mental retardation ?  a) Mild ?  b) moderate ?  c) Severe ?  d) Unable to determine ?

## 2018-09-01 NOTE — CDI QUERY NOTE - NSCDIOTHERTXTBX_GEN_ALL_CORE_HH
Per documentation patient admitted with Hyperglycemia , DM 2, No evidence of DKA    lactate= 3.4 > 4.3 > 3.1    Can the above lab values be further clarified ?  a) Lactic acidosis ?  b) Lacticemia ?  c) Labs are not clinically significant ?  d) Other ? Please clarify the specific diagnosis

## 2018-09-02 LAB
HCT VFR BLD CALC: 20.9 % — CRITICAL LOW (ref 39–50)
HGB BLD-MCNC: 7 G/DL — CRITICAL LOW (ref 13–17)
MCHC RBC-ENTMCNC: 31.8 PG — SIGNIFICANT CHANGE UP (ref 27–34)
MCHC RBC-ENTMCNC: 33.5 GM/DL — SIGNIFICANT CHANGE UP (ref 32–36)
MCV RBC AUTO: 95 FL — SIGNIFICANT CHANGE UP (ref 80–100)
NRBC # BLD: 0 /100 WBCS — SIGNIFICANT CHANGE UP (ref 0–0)
PLATELET # BLD AUTO: 138 K/UL — LOW (ref 150–400)
RBC # BLD: 2.2 M/UL — LOW (ref 4.2–5.8)
RBC # FLD: 13.8 % — SIGNIFICANT CHANGE UP (ref 10.3–14.5)
WBC # BLD: 7.59 K/UL — SIGNIFICANT CHANGE UP (ref 3.8–10.5)
WBC # FLD AUTO: 7.59 K/UL — SIGNIFICANT CHANGE UP (ref 3.8–10.5)

## 2018-09-02 RX ORDER — INSULIN GLARGINE 100 [IU]/ML
15 INJECTION, SOLUTION SUBCUTANEOUS AT BEDTIME
Qty: 0 | Refills: 0 | Status: DISCONTINUED | OUTPATIENT
Start: 2018-09-02 | End: 2018-09-03

## 2018-09-02 RX ADMIN — INSULIN GLARGINE 20 UNIT(S): 100 INJECTION, SOLUTION SUBCUTANEOUS at 21:48

## 2018-09-02 RX ADMIN — ESCITALOPRAM OXALATE 20 MILLIGRAM(S): 10 TABLET, FILM COATED ORAL at 11:19

## 2018-09-02 RX ADMIN — ROPINIROLE 0.75 MILLIGRAM(S): 8 TABLET, FILM COATED, EXTENDED RELEASE ORAL at 21:47

## 2018-09-02 RX ADMIN — DIVALPROEX SODIUM 1000 MILLIGRAM(S): 500 TABLET, DELAYED RELEASE ORAL at 21:48

## 2018-09-02 RX ADMIN — Medication 5 MILLIGRAM(S): at 08:49

## 2018-09-02 RX ADMIN — ROPINIROLE 0.75 MILLIGRAM(S): 8 TABLET, FILM COATED, EXTENDED RELEASE ORAL at 14:00

## 2018-09-02 RX ADMIN — INSULIN GLARGINE 15 UNIT(S): 100 INJECTION, SOLUTION SUBCUTANEOUS at 21:48

## 2018-09-02 RX ADMIN — CARBIDOPA AND LEVODOPA 1 TABLET(S): 25; 100 TABLET ORAL at 04:32

## 2018-09-02 RX ADMIN — ARIPIPRAZOLE 30 MILLIGRAM(S): 15 TABLET ORAL at 11:19

## 2018-09-02 RX ADMIN — PANTOPRAZOLE SODIUM 10 MG/HR: 20 TABLET, DELAYED RELEASE ORAL at 05:29

## 2018-09-02 RX ADMIN — DIVALPROEX SODIUM 1000 MILLIGRAM(S): 500 TABLET, DELAYED RELEASE ORAL at 11:20

## 2018-09-02 RX ADMIN — TAMSULOSIN HYDROCHLORIDE 0.4 MILLIGRAM(S): 0.4 CAPSULE ORAL at 21:47

## 2018-09-02 RX ADMIN — CARBIDOPA AND LEVODOPA 1 TABLET(S): 25; 100 TABLET ORAL at 17:06

## 2018-09-02 RX ADMIN — Medication 10 MILLIGRAM(S): at 21:47

## 2018-09-02 RX ADMIN — PANTOPRAZOLE SODIUM 10 MG/HR: 20 TABLET, DELAYED RELEASE ORAL at 17:07

## 2018-09-02 RX ADMIN — CARBIDOPA AND LEVODOPA 1 TABLET(S): 25; 100 TABLET ORAL at 14:00

## 2018-09-02 RX ADMIN — ROPINIROLE 0.75 MILLIGRAM(S): 8 TABLET, FILM COATED, EXTENDED RELEASE ORAL at 04:32

## 2018-09-02 RX ADMIN — Medication 100 MILLIGRAM(S): at 04:33

## 2018-09-02 RX ADMIN — LEVETIRACETAM 250 MILLIGRAM(S): 250 TABLET, FILM COATED ORAL at 04:32

## 2018-09-02 RX ADMIN — POLYETHYLENE GLYCOL 3350 17 GRAM(S): 17 POWDER, FOR SOLUTION ORAL at 04:34

## 2018-09-02 RX ADMIN — LEVETIRACETAM 250 MILLIGRAM(S): 250 TABLET, FILM COATED ORAL at 17:05

## 2018-09-02 RX ADMIN — QUETIAPINE FUMARATE 50 MILLIGRAM(S): 200 TABLET, FILM COATED ORAL at 11:19

## 2018-09-02 RX ADMIN — CARBIDOPA AND LEVODOPA 1 TABLET(S): 25; 100 TABLET ORAL at 10:41

## 2018-09-02 RX ADMIN — Medication 100 MILLIGRAM(S): at 17:05

## 2018-09-02 RX ADMIN — POLYETHYLENE GLYCOL 3350 17 GRAM(S): 17 POWDER, FOR SOLUTION ORAL at 17:04

## 2018-09-02 NOTE — PROGRESS NOTE ADULT - ASSESSMENT
GI bleeding, continue Protonix.  Drop in hematocrit noted.  No further bowel movements with blood.  CT scan does show esophagitis.    Continue PPI  Consider endoscopy, however, phosphorus 4, have not been able to reach family members.     blood cell transfusion

## 2018-09-02 NOTE — PROGRESS NOTE ADULT - SUBJECTIVE AND OBJECTIVE BOX
Patient is a 68y old  Male who presents with a chief complaint of vomiting coffee grounds, poor po intact (30 Aug 2018 15:44)      HPI:  see consult note  Patient with MR vomitting with poorly controlled diabetes, admitted uncontrolled blood sugars, DKA worsening. (30 Aug 2018 15:44)    Patient comfortable. Tolerating diet. No further bowel movements. Drop in hemoglobin noted.  Patient is a poor historian and cannot relate history well.  History is per the patient's assistant as well as RN, case discussed with hospitalist      PAST MEDICAL & SURGICAL HISTORY:  Hypertension  Seizures  Parkinson disease  H/O foot surgery      MEDICATIONS  (STANDING):  ARIPiprazole 30 milliGRAM(s) Oral daily  carbidopa/levodopa  25/100 1 Tablet(s) Oral four times a day  dextrose 5%. 1000 milliLiter(s) (50 mL/Hr) IV Continuous <Continuous>  dextrose 50% Injectable 50 milliLiter(s) IV Push every 15 minutes  dextrose 50% Injectable 25 milliLiter(s) IV Push every 15 minutes  diVALproex ER 1000 milliGRAM(s) Oral daily  diVALproex ER 1000 milliGRAM(s) Oral at bedtime  docusate sodium 100 milliGRAM(s) Oral two times a day  escitalopram 20 milliGRAM(s) Oral daily  insulin glargine Injectable (LANTUS) 15 Unit(s) SubCutaneous at bedtime  insulin glargine Injectable (LANTUS) 20 Unit(s) SubCutaneous at bedtime  insulin lispro (HumaLOG) corrective regimen sliding scale   SubCutaneous three times a day before meals  insulin lispro (HumaLOG) corrective regimen sliding scale   SubCutaneous at bedtime  levETIRAcetam 250 milliGRAM(s) Oral two times a day  oxybutynin 10 milliGRAM(s) Oral at bedtime  pantoprazole Infusion 8 mG/Hr (10 mL/Hr) IV Continuous <Continuous>  polyethylene glycol 3350 17 Gram(s) Oral two times a day  QUEtiapine 50 milliGRAM(s) Oral daily  rOPINIRole 0.75 milliGRAM(s) Oral three times a day  tamsulosin 0.4 milliGRAM(s) Oral at bedtime    MEDICATIONS  (PRN):  bisacodyl 5 milliGRAM(s) Oral every 12 hours PRN Constipation  dextrose 40% Gel 15 Gram(s) Oral once PRN Blood Glucose LESS THAN 70 milliGRAM(s)/deciliter  glucagon  Injectable 1 milliGRAM(s) IntraMuscular once PRN Glucose LESS THAN 70 milligrams/deciliter      Allergies    No Known Allergies    Intolerances        SOCIAL HISTORY:NC    FAMILY HISTORY:  No pertinent family history in first degree relatives      REVIEW OF SYSTEMS:    CONSTITUTIONAL: No weakness, fevers or chills  EYES/ENT: No visual changes;  No vertigo or throat pain   NECK: No pain or stiffness  RESPIRATORY: No cough, wheezing, hemoptysis; No shortness of breath  CARDIOVASCULAR: No chest pain or palpitations  GENITOURINARY: No dysuria, frequency or hematuria  NEUROLOGICAL: No numbness or weakness  SKIN: No itching, burning, rashes, or lesions   All other review of systems is negative unless indicated above.    Vital Signs Last 24 Hrs  T(C): 36.7 (02 Sep 2018 11:30), Max: 36.8 (01 Sep 2018 21:00)  T(F): 98.1 (02 Sep 2018 11:30), Max: 98.3 (02 Sep 2018 11:05)  HR: 83 (02 Sep 2018 11:30) (82 - 89)  BP: 119/84 (02 Sep 2018 11:30) (100/69 - 127/63)  BP(mean): --  RR: 16 (02 Sep 2018 11:30) (16 - 18)  SpO2: 100% (02 Sep 2018 11:30) (93% - 100%)    PHYSICAL EXAM:    Constitutional: NAD, well-developed  HEENT: EOMI, throat clear  Neck: No LAD, supple  Respiratory: CTA and P  Cardiovascular: S1 and S2, RRR, no M  Gastrointestinal: BS+, soft, NT/ND, neg HSM,  Extremities: No peripheral edema, neg clubing, cyanosis  Vascular: 2+ peripheral pulses  Neurological: A/O x 1, no focal deficits  Psychiatric: Normal mood, normal affect  Skin: No rashes    LABS:  CBC Full  -  ( 02 Sep 2018 07:09 )  WBC Count : 7.59 K/uL  Hemoglobin : 7.0 g/dL  Hematocrit : 20.9 %  Platelet Count - Automated : 138 K/uL  Mean Cell Volume : 95.0 fl  Mean Cell Hemoglobin : 31.8 pg  Mean Cell Hemoglobin Concentration : 33.5 gm/dL  Auto Neutrophil # : x  Auto Lymphocyte # : x  Auto Monocyte # : x  Auto Eosinophil # : x  Auto Basophil # : x  Auto Neutrophil % : x  Auto Lymphocyte % : x  Auto Monocyte % : x  Auto Eosinophil % : x  Auto Basophil % : x    09-01    146<H>  |  112<H>  |  38<H>  ----------------------------<  126<H>  3.5   |  26  |  0.77    Ca    8.5      01 Sep 2018 07:31  Phos  2.3     09-01  Mg     1.9     09-01              RADIOLOGY & ADDITIONAL STUDIES:

## 2018-09-02 NOTE — PROGRESS NOTE ADULT - ASSESSMENT
69yo M pmh HTN, DM-2, PD, Sz dirorder, mod MR BPH, GERD, admitted with esophagitis and uncontrolled hyperglycemia  Patient with moderate MR admitted with uncontrolled diabetes and hematemesis.   Pt was treated in ICU with insulin gtt. No evidence of DKA. No AG. PT sugars now better controlled.   CTAP c/w stercoral colitis/esophagitis  No GI intervention planned      #Acute blood loss anemia   UGIB  Uncontrolled DM-2  Leukocytosis  Elevated lactate  Esophagitis  Stercoral colitis  HTN      Recc:  BGM now better controlled  No AG, elevated lactate was likely an early elevation d/t impending DKA  Continue present management  Leukocytosis- CXR and UA not suggestive of infection however now c/o cough.   Obtain CXR to r/o asp pna in setting of hematemesis  Cont PPI for esophagitis  Transfuse with 2 units PRBC now  Serial CBC  Plan for EGD tuesday per GI. D/W Dr Downs  BP control  PT consult    Total time during encounter: 60 min

## 2018-09-02 NOTE — PROGRESS NOTE ADULT - SUBJECTIVE AND OBJECTIVE BOX
Patient is a 68y old  Male who presents with a chief complaint of vomiting coffee grounds, poor po intact       SUBJECTIVE:   HPI:  see consult note  Patient with moderate MR admitted with uncontrolled diabetes and hematemesis.   Pt was treated in ICU with insulin gtt. No evidence of DKA. No AG. PT sugars now better controlled.   CTAP c/w stercoral colitis/esophagitis  No GI intervention planned      9/1: pt complaining of cough/congestion. Hgb dropped from 837 to 7.7. No melena or hematochezia or hematemesis o/n.   9/2: Hgb 7. No melena/hematochezia. Made multiple attempts to reach ADRIA Silva at [phone] and brother [phone] with no success. Called head supervisor Lupis Luke who is not at liberty to give consent for emergency blood products- Give the urgency of situation, 2 physician administrative consent has been obtained for PRBC transfusion.   Group home administrators will be attempting to notify family members      .    REVIEW OF SYSTEMS:    CONSTITUTIONAL: No weakness, fevers or chills  EYES/ENT: No visual changes;  No vertigo or throat pain   NECK: No pain or stiffness  RESPIRATORY: No cough, wheezing, hemoptysis; No shortness of breath  CARDIOVASCULAR: No chest pain or palpitations  GASTROINTESTINAL: No abdominal or epigastric pain. No nausea, vomiting, or hematemesis; No diarrhea or constipation. No melena or hematochezia.  GENITOURINARY: No dysuria, frequency or hematuria  NEUROLOGICAL: No numbness or weakness  SKIN: No itching, burning, rashes, or lesions   All other review of systems is negative unless indicated above      ICU Vital Signs Last 24 Hrs  T(C): 36.6 (02 Sep 2018 04:37), Max: 36.8 (01 Sep 2018 21:00)  T(F): 97.8 (02 Sep 2018 04:37), Max: 98.2 (01 Sep 2018 21:00)  HR: 82 (02 Sep 2018 04:37) (82 - 94)  BP: 127/63 (02 Sep 2018 04:37) (100/69 - 127/63)  BP(mean): --  ABP: --  ABP(mean): --  RR: 18 (02 Sep 2018 04:37) (16 - 18)  SpO2: 93% (02 Sep 2018 04:37) (93% - 98%)          POCT Blood Glucose.: 132 mg/dL (01 Sep 2018 11:57)  POCT Blood Glucose.: 128 mg/dL (01 Sep 2018 08:05)  POCT Blood Glucose.: 230 mg/dL (31 Aug 2018 22:14)  POCT Blood Glucose.: 286 mg/dL (31 Aug 2018 17:46)  POCT Blood Glucose.: 345 mg/dL (31 Aug 2018 16:04)      PHYSICAL EXAM:    Constitutional: NAD, awake  HEENT: PERR, EOMI, Normal Hearing, MMM  Neck: Soft and supple, No LAD, No JVD  Respiratory: Breath sounds are clear bilaterally, No wheezing, rales or rhonchi  Cardiovascular: S1 and S2, regular rate and rhythm, no Murmurs, gallops or rubs  Gastrointestinal: Bowel Sounds present, soft, mildly distended, BS in all 4 quadrants  Extremities: No peripheral edema  Vascular: 2+ peripheral pulses  Neurological: A/O x 2, no focal deficits  Musculoskeletal: 5/5 strength b/l upper and lower extremities  Skin: No rashes    MEDICATIONS:  MEDICATIONS  (STANDING):  ARIPiprazole 30 milliGRAM(s) Oral daily  carbidopa/levodopa  25/100 1 Tablet(s) Oral four times a day  dextrose 5%. 1000 milliLiter(s) (50 mL/Hr) IV Continuous <Continuous>  dextrose 50% Injectable 50 milliLiter(s) IV Push every 15 minutes  dextrose 50% Injectable 25 milliLiter(s) IV Push every 15 minutes  diVALproex ER 1000 milliGRAM(s) Oral daily  diVALproex ER 1000 milliGRAM(s) Oral at bedtime  docusate sodium 100 milliGRAM(s) Oral two times a day  escitalopram 20 milliGRAM(s) Oral daily  insulin glargine Injectable (LANTUS) 20 Unit(s) SubCutaneous every morning  insulin glargine Injectable (LANTUS) 20 Unit(s) SubCutaneous at bedtime  insulin lispro (HumaLOG) corrective regimen sliding scale   SubCutaneous three times a day before meals  insulin lispro (HumaLOG) corrective regimen sliding scale   SubCutaneous at bedtime  levETIRAcetam 250 milliGRAM(s) Oral two times a day  oxybutynin 10 milliGRAM(s) Oral at bedtime  pantoprazole Infusion 8 mG/Hr (10 mL/Hr) IV Continuous <Continuous>  polyethylene glycol 3350 17 Gram(s) Oral two times a day  QUEtiapine 50 milliGRAM(s) Oral daily  rOPINIRole 0.75 milliGRAM(s) Oral three times a day  tamsulosin 0.4 milliGRAM(s) Oral at bedtime      LABS: All Labs Reviewed:                        7.7    8.87  )-----------( 142      ( 01 Sep 2018 07:31 )             23.5     09-01    146<H>  |  112<H>  |  38<H>  ----------------------------<  126<H>  3.5   |  26  |  0.77    Ca    8.5      01 Sep 2018 07:31  Phos  2.3     09-01  Mg     1.9     09-01                Blood Culture: 08-30 @ 08:45  Organism --  Gram Stain Blood -- Gram Stain --  Specimen Source .Urine None  Culture-Blood --        RADIOLOGY/EKG:    < from: CT Abdomen and Pelvis w/ IV Cont (08.30.18 @ 09:54) >    IMPRESSION:     Suspect severe distal esophagitis and reflux. Distended stomach,   correlate for gastroparesis.    < end of copied text >

## 2018-09-03 LAB
ANION GAP SERPL CALC-SCNC: 7 MMOL/L — SIGNIFICANT CHANGE UP (ref 5–17)
BUN SERPL-MCNC: 23 MG/DL — SIGNIFICANT CHANGE UP (ref 7–23)
CALCIUM SERPL-MCNC: 8.4 MG/DL — LOW (ref 8.5–10.1)
CHLORIDE SERPL-SCNC: 110 MMOL/L — HIGH (ref 96–108)
CO2 SERPL-SCNC: 28 MMOL/L — SIGNIFICANT CHANGE UP (ref 22–31)
CREAT SERPL-MCNC: 0.57 MG/DL — SIGNIFICANT CHANGE UP (ref 0.5–1.3)
GLUCOSE SERPL-MCNC: 54 MG/DL — LOW (ref 70–99)
HCT VFR BLD CALC: 30.1 % — LOW (ref 39–50)
HGB BLD-MCNC: 10.2 G/DL — LOW (ref 13–17)
MCHC RBC-ENTMCNC: 31.1 PG — SIGNIFICANT CHANGE UP (ref 27–34)
MCHC RBC-ENTMCNC: 33.9 GM/DL — SIGNIFICANT CHANGE UP (ref 32–36)
MCV RBC AUTO: 91.8 FL — SIGNIFICANT CHANGE UP (ref 80–100)
NRBC # BLD: 0 /100 WBCS — SIGNIFICANT CHANGE UP (ref 0–0)
PLATELET # BLD AUTO: 159 K/UL — SIGNIFICANT CHANGE UP (ref 150–400)
POTASSIUM SERPL-MCNC: 3.4 MMOL/L — LOW (ref 3.5–5.3)
POTASSIUM SERPL-SCNC: 3.4 MMOL/L — LOW (ref 3.5–5.3)
RBC # BLD: 3.28 M/UL — LOW (ref 4.2–5.8)
RBC # FLD: 15.4 % — HIGH (ref 10.3–14.5)
SODIUM SERPL-SCNC: 145 MMOL/L — SIGNIFICANT CHANGE UP (ref 135–145)
WBC # BLD: 7.82 K/UL — SIGNIFICANT CHANGE UP (ref 3.8–10.5)
WBC # FLD AUTO: 7.82 K/UL — SIGNIFICANT CHANGE UP (ref 3.8–10.5)

## 2018-09-03 PROCEDURE — 74019 RADEX ABDOMEN 2 VIEWS: CPT | Mod: 26

## 2018-09-03 RX ORDER — MINERAL OIL
133 OIL (ML) MISCELLANEOUS
Qty: 0 | Refills: 0 | Status: DISCONTINUED | OUTPATIENT
Start: 2018-09-03 | End: 2018-09-08

## 2018-09-03 RX ORDER — PIPERACILLIN AND TAZOBACTAM 4; .5 G/20ML; G/20ML
3.38 INJECTION, POWDER, LYOPHILIZED, FOR SOLUTION INTRAVENOUS EVERY 8 HOURS
Qty: 0 | Refills: 0 | Status: DISCONTINUED | OUTPATIENT
Start: 2018-09-03 | End: 2018-09-07

## 2018-09-03 RX ORDER — VANCOMYCIN HCL 1 G
1000 VIAL (EA) INTRAVENOUS ONCE
Qty: 0 | Refills: 0 | Status: COMPLETED | OUTPATIENT
Start: 2018-09-03 | End: 2018-09-03

## 2018-09-03 RX ORDER — INSULIN GLARGINE 100 [IU]/ML
7 INJECTION, SOLUTION SUBCUTANEOUS AT BEDTIME
Qty: 0 | Refills: 0 | Status: DISCONTINUED | OUTPATIENT
Start: 2018-09-03 | End: 2018-09-09

## 2018-09-03 RX ORDER — POTASSIUM CHLORIDE 20 MEQ
40 PACKET (EA) ORAL ONCE
Qty: 0 | Refills: 0 | Status: COMPLETED | OUTPATIENT
Start: 2018-09-03 | End: 2018-09-03

## 2018-09-03 RX ADMIN — CARBIDOPA AND LEVODOPA 1 TABLET(S): 25; 100 TABLET ORAL at 13:18

## 2018-09-03 RX ADMIN — LEVETIRACETAM 250 MILLIGRAM(S): 250 TABLET, FILM COATED ORAL at 17:02

## 2018-09-03 RX ADMIN — CARBIDOPA AND LEVODOPA 1 TABLET(S): 25; 100 TABLET ORAL at 05:33

## 2018-09-03 RX ADMIN — ROPINIROLE 0.75 MILLIGRAM(S): 8 TABLET, FILM COATED, EXTENDED RELEASE ORAL at 21:57

## 2018-09-03 RX ADMIN — POLYETHYLENE GLYCOL 3350 17 GRAM(S): 17 POWDER, FOR SOLUTION ORAL at 17:03

## 2018-09-03 RX ADMIN — PIPERACILLIN AND TAZOBACTAM 25 GRAM(S): 4; .5 INJECTION, POWDER, LYOPHILIZED, FOR SOLUTION INTRAVENOUS at 14:50

## 2018-09-03 RX ADMIN — DIVALPROEX SODIUM 1000 MILLIGRAM(S): 500 TABLET, DELAYED RELEASE ORAL at 21:56

## 2018-09-03 RX ADMIN — Medication 2: at 11:50

## 2018-09-03 RX ADMIN — TAMSULOSIN HYDROCHLORIDE 0.4 MILLIGRAM(S): 0.4 CAPSULE ORAL at 21:56

## 2018-09-03 RX ADMIN — CARBIDOPA AND LEVODOPA 1 TABLET(S): 25; 100 TABLET ORAL at 10:14

## 2018-09-03 RX ADMIN — Medication 40 MILLIEQUIVALENT(S): at 10:14

## 2018-09-03 RX ADMIN — Medication 250 MILLIGRAM(S): at 14:05

## 2018-09-03 RX ADMIN — Medication 100 MILLIGRAM(S): at 17:02

## 2018-09-03 RX ADMIN — Medication 10 MILLIGRAM(S): at 21:57

## 2018-09-03 RX ADMIN — Medication 133 MILLILITER(S): at 17:02

## 2018-09-03 RX ADMIN — ARIPIPRAZOLE 30 MILLIGRAM(S): 15 TABLET ORAL at 11:01

## 2018-09-03 RX ADMIN — Medication 5 MILLIGRAM(S): at 17:03

## 2018-09-03 RX ADMIN — Medication 100 MILLIGRAM(S): at 05:33

## 2018-09-03 RX ADMIN — ESCITALOPRAM OXALATE 20 MILLIGRAM(S): 10 TABLET, FILM COATED ORAL at 11:49

## 2018-09-03 RX ADMIN — LEVETIRACETAM 250 MILLIGRAM(S): 250 TABLET, FILM COATED ORAL at 05:33

## 2018-09-03 RX ADMIN — PIPERACILLIN AND TAZOBACTAM 25 GRAM(S): 4; .5 INJECTION, POWDER, LYOPHILIZED, FOR SOLUTION INTRAVENOUS at 21:57

## 2018-09-03 RX ADMIN — Medication 5 MILLIGRAM(S): at 05:33

## 2018-09-03 RX ADMIN — QUETIAPINE FUMARATE 50 MILLIGRAM(S): 200 TABLET, FILM COATED ORAL at 11:02

## 2018-09-03 RX ADMIN — PANTOPRAZOLE SODIUM 10 MG/HR: 20 TABLET, DELAYED RELEASE ORAL at 13:19

## 2018-09-03 RX ADMIN — DIVALPROEX SODIUM 1000 MILLIGRAM(S): 500 TABLET, DELAYED RELEASE ORAL at 11:02

## 2018-09-03 RX ADMIN — Medication 2: at 16:52

## 2018-09-03 RX ADMIN — ROPINIROLE 0.75 MILLIGRAM(S): 8 TABLET, FILM COATED, EXTENDED RELEASE ORAL at 13:17

## 2018-09-03 RX ADMIN — ROPINIROLE 0.75 MILLIGRAM(S): 8 TABLET, FILM COATED, EXTENDED RELEASE ORAL at 05:33

## 2018-09-03 RX ADMIN — CARBIDOPA AND LEVODOPA 1 TABLET(S): 25; 100 TABLET ORAL at 17:02

## 2018-09-03 RX ADMIN — POLYETHYLENE GLYCOL 3350 17 GRAM(S): 17 POWDER, FOR SOLUTION ORAL at 05:33

## 2018-09-03 RX ADMIN — PANTOPRAZOLE SODIUM 10 MG/HR: 20 TABLET, DELAYED RELEASE ORAL at 02:11

## 2018-09-03 NOTE — PROGRESS NOTE ADULT - ASSESSMENT
GI bleed–status post back or blood transfusion and appears to be stable. No further evidence of bleeding.  Endoscopy would be reasonable however, I cannot obtain consent as family members are not responding.    Discussed with RN as well as with the hospitalist to assist with obtaining consent if possible.    Constipation and fecal impaction, would proceed with mineral oil enemas as well as MiraLAX    Dr Shields to resume care in AM  NPO P MN

## 2018-09-03 NOTE — PROGRESS NOTE ADULT - SUBJECTIVE AND OBJECTIVE BOX
Patient is a 68y old  Male who presents with a chief complaint of vomiting coffee grounds, poor po intact (30 Aug 2018 15:44)      HPI:  see consult note  Patient with MR vomitting with poorly controlled diabetes, admitted uncontrolled blood sugars, DKA worsening. (30 Aug 2018 15:44)    Patient cannot relate history, poor historian.  I have attempted: Patient's wife however, her voice knows full now.  History is per patient and RN as well as assistant in the room.  Abdominal distention is noted. Has not had any bowel movements. No further evidence of bleeding.        PAST MEDICAL & SURGICAL HISTORY:  Hypertension  Seizures  Parkinson disease  H/O foot surgery      MEDICATIONS  (STANDING):  ARIPiprazole 30 milliGRAM(s) Oral daily  carbidopa/levodopa  25/100 1 Tablet(s) Oral four times a day  dextrose 5%. 1000 milliLiter(s) (50 mL/Hr) IV Continuous <Continuous>  dextrose 50% Injectable 50 milliLiter(s) IV Push every 15 minutes  dextrose 50% Injectable 25 milliLiter(s) IV Push every 15 minutes  diVALproex ER 1000 milliGRAM(s) Oral daily  diVALproex ER 1000 milliGRAM(s) Oral at bedtime  docusate sodium 100 milliGRAM(s) Oral two times a day  escitalopram 20 milliGRAM(s) Oral daily  insulin glargine Injectable (LANTUS) 7 Unit(s) SubCutaneous at bedtime  insulin lispro (HumaLOG) corrective regimen sliding scale   SubCutaneous three times a day before meals  insulin lispro (HumaLOG) corrective regimen sliding scale   SubCutaneous at bedtime  levETIRAcetam 250 milliGRAM(s) Oral two times a day  oxybutynin 10 milliGRAM(s) Oral at bedtime  pantoprazole Infusion 8 mG/Hr (10 mL/Hr) IV Continuous <Continuous>  polyethylene glycol 3350 17 Gram(s) Oral two times a day  QUEtiapine 50 milliGRAM(s) Oral daily  rOPINIRole 0.75 milliGRAM(s) Oral three times a day  tamsulosin 0.4 milliGRAM(s) Oral at bedtime    MEDICATIONS  (PRN):  bisacodyl 5 milliGRAM(s) Oral every 12 hours PRN Constipation  dextrose 40% Gel 15 Gram(s) Oral once PRN Blood Glucose LESS THAN 70 milliGRAM(s)/deciliter  glucagon  Injectable 1 milliGRAM(s) IntraMuscular once PRN Glucose LESS THAN 70 milligrams/deciliter      Allergies    No Known Allergies    Intolerances        SOCIAL HISTORY:    FAMILY HISTORY:  No pertinent family history in first degree relatives      REVIEW OF SYSTEMS:    CONSTITUTIONAL: No weakness, fevers or chills  EYES/ENT: No visual changes;  No vertigo or throat pain   NECK: No pain or stiffness  RESPIRATORY: No cough, wheezing, hemoptysis; No shortness of breath  CARDIOVASCULAR: No chest pain or palpitations  GENITOURINARY: No dysuria, frequency or hematuria  NEUROLOGICAL: No numbness or weakness  SKIN: No itching, burning, rashes, or lesions   All other review of systems is negative unless indicated above.    Vital Signs Last 24 Hrs  T(C): 36.3 (03 Sep 2018 11:20), Max: 36.8 (02 Sep 2018 14:30)  T(F): 97.4 (03 Sep 2018 11:20), Max: 98.3 (02 Sep 2018 14:30)  HR: 81 (03 Sep 2018 11:20) (81 - 97)  BP: 96/64 (03 Sep 2018 11:20) (96/64 - 129/75)  BP(mean): --  RR: 16 (03 Sep 2018 11:20) (16 - 18)  SpO2: 97% (03 Sep 2018 11:20) (93% - 100%)    PHYSICAL EXAM:    Constitutional: NAD, well-developed  HEENT: EOMI, throat clear  Neck: No LAD, supple  Respiratory: CTA and P  Cardiovascular: S1 and S2, RRR, no M  Gastrointestinal: BS+, soft, mild distiension/ND, neg HSM,  Extremities: No peripheral edema, neg clubing, cyanosis  Vascular: 2+ peripheral pulses  Neurological: A/O x 3, no focal deficits  Psychiatric: Normal mood, normal affect  Skin: No rashes    LABS:  CBC Full  -  ( 03 Sep 2018 08:03 )  WBC Count : 7.82 K/uL  Hemoglobin : 10.2 g/dL  Hematocrit : 30.1 %  Platelet Count - Automated : 159 K/uL  Mean Cell Volume : 91.8 fl  Mean Cell Hemoglobin : 31.1 pg  Mean Cell Hemoglobin Concentration : 33.9 gm/dL  Auto Neutrophil # : x  Auto Lymphocyte # : x  Auto Monocyte # : x  Auto Eosinophil # : x  Auto Basophil # : x  Auto Neutrophil % : x  Auto Lymphocyte % : x  Auto Monocyte % : x  Auto Eosinophil % : x  Auto Basophil % : x    09-03    145  |  110<H>  |  23  ----------------------------<  54<L>  3.4<L>   |  28  |  0.57    Ca    8.4<L>      03 Sep 2018 08:03              RADIOLOGY & ADDITIONAL STUDIES:  < from: CT Abdomen and Pelvis w/ IV Cont (08.30.18 @ 09:54) >  EXAM:  CT ABDOMEN AND PELVIS IC                            PROCEDURE DATE:  08/30/2018          INTERPRETATION:  Clinical information: Abdominal pain, nausea and vomiting    Comparison: 2017    PROCEDURE:   CT of the Abdomen and Pelvis was performed with intravenous contrast.  90ml of Omnipaque 350 was injected intravenously. 10cc was discarded.    FINDINGS:    LOWER CHEST: Thickened fluid distended distal esophagus with a small   hiatal hernia.    ABDOMEN:  LIVER: Subcentimeter hypodensities.  BILE DUCTS: Normal caliber  GALLBLADDER: No calcified gallstones. Normal caliber wall  PANCREAS: Within normal limits  SPLEEN: Within normal limits  ADRENALS: Within normal limits  KIDNEYS: 15 cm left renal cyst and bilateral smaller cysts.    PELVIS:  REPRODUCTIVE ORGANS: No pelvic masses  BLADDER: Within normal limits    PERITONEUM:No ascites, no free air.  BOWEL: Severe rectosigmoid fecal impaction. Distended stomach. No small   bowel obstruction.  VESSELS: Within normal limits  RETROPERITONEUM: No retroperitoneal or pelvic adenopathy  ABDOMINAL WALL: Within normal limits  MUSCULOSKELETAL: Multiple spinal compression fractures. Left hip   orthopedic hardware.    IMPRESSION:     Suspect severe distal esophagitis and reflux. Distended stomach,   correlate for gastroparesis.    Severe rectosigmoid fecal impaction and probable stercoral colitis.                    SYMONE REEDER   This document has been electronically signed. Aug 30 2018 10:17AM    < end of copied text >

## 2018-09-03 NOTE — PROGRESS NOTE ADULT - ASSESSMENT
67yo M pmh HTN, DM-2, PD, Sz dirorder, mod MR BPH, GERD, admitted with esophagitis and uncontrolled hyperglycemia  Patient with moderate MR admitted with uncontrolled diabetes and hematemesis.   Pt was treated in ICU with insulin gtt. No evidence of DKA. No AG. PT sugars now better controlled.   CTAP c/w stercoral colitis/esophagitis  No GI intervention planned      #Acute blood loss anemia   LLL infiltrate suspected asp pna  UGIB  Uncontrolled DM-2  Leukocytosis  Elevated lactate  Esophagitis  Constipation  Stercoral colitis  HTN      Recc:  Give one dose vanco and start zosyn for suspected GN rods and other poss resistant organisms  Transfused with 2 units PRBC. Hgb stable at 10.   Plan for EGD in am after consent obtained D/W HCP Charu [phone] who will await to discuss same with GI in am  AXR for distended abd and r/o obstructive pattern  Mineral oil enema  BGM now better controlled  Cont PPI for esophagitis        Total time during encounter: 60 min

## 2018-09-03 NOTE — PROGRESS NOTE ADULT - SUBJECTIVE AND OBJECTIVE BOX
Patient is a 68y old  Male who presents with a chief complaint of vomiting coffee grounds, poor po intact       SUBJECTIVE:   HPI:  see consult note  Patient with moderate MR admitted with uncontrolled diabetes and hematemesis.   Pt was treated in ICU with insulin gtt. No evidence of DKA. No AG. PT sugars now better controlled.   CTAP c/w stercoral colitis/esophagitis  No GI intervention planned      9/1: pt complaining of cough/congestion. Hgb dropped from 837 to 7.7. No melena or hematochezia or hematemesis o/n.   9/2: Hgb 7. No melena/hematochezia. Made multiple attempts to reach ADRIA Silva at [phone] and brother [phone] with no success. Called head supervisor Lupis Luke who is not at liberty to give consent for emergency blood products- Give the urgency of situation, 2 physician administrative consent has been obtained for PRBC transfusion.   Group home administrators will be attempting to notify family members  9/3: Hgb stable. No BM yet. No abd pain or vomiting. abd distended Good bowel sounds. Spoke with Shira HCP and willing to consent to EGD      .    REVIEW OF SYSTEMS:    CONSTITUTIONAL: No weakness, fevers or chills  EYES/ENT: No visual changes;  No vertigo or throat pain   NECK: No pain or stiffness  RESPIRATORY: No cough, wheezing, hemoptysis; No shortness of breath  CARDIOVASCULAR: No chest pain or palpitations  GASTROINTESTINAL: No abdominal or epigastric pain. No nausea, vomiting, or hematemesis; No diarrhea or constipation. No melena or hematochezia.  GENITOURINARY: No dysuria, frequency or hematuria  NEUROLOGICAL: No numbness or weakness  SKIN: No itching, burning, rashes, or lesions   All other review of systems is negative unless indicated above    ICU Vital Signs Last 24 Hrs  T(C): 36.3 (03 Sep 2018 11:20), Max: 36.8 (02 Sep 2018 14:30)  T(F): 97.4 (03 Sep 2018 11:20), Max: 98.3 (02 Sep 2018 14:30)  HR: 81 (03 Sep 2018 11:20) (81 - 97)  BP: 96/64 (03 Sep 2018 11:20) (96/64 - 129/75)  BP(mean): --  ABP: --  ABP(mean): --  RR: 16 (03 Sep 2018 11:20) (16 - 18)  SpO2: 97% (03 Sep 2018 11:20) (93% - 100%)        PHYSICAL EXAM:    Constitutional: NAD, awake  HEENT: PERR, EOMI, Normal Hearing, MMM  Neck: Soft and supple, No LAD, No JVD  Respiratory: Breath sounds are clear bilaterally, No wheezing, rales or rhonchi  Cardiovascular: S1 and S2, regular rate and rhythm, no Murmurs, gallops or rubs  Gastrointestinal: Bowel Sounds present, soft, mildly distended, BS in all 4 quadrants  Extremities: No peripheral edema  Vascular: 2+ peripheral pulses  Neurological: A/O x 2, no focal deficits  Musculoskeletal: 5/5 strength b/l upper and lower extremities  Skin: No rashes    MEDICATIONS:  MEDICATIONS  (STANDING):  ARIPiprazole 30 milliGRAM(s) Oral daily  carbidopa/levodopa  25/100 1 Tablet(s) Oral four times a day  dextrose 5%. 1000 milliLiter(s) (50 mL/Hr) IV Continuous <Continuous>  dextrose 50% Injectable 50 milliLiter(s) IV Push every 15 minutes  dextrose 50% Injectable 25 milliLiter(s) IV Push every 15 minutes  diVALproex ER 1000 milliGRAM(s) Oral daily  diVALproex ER 1000 milliGRAM(s) Oral at bedtime  docusate sodium 100 milliGRAM(s) Oral two times a day  escitalopram 20 milliGRAM(s) Oral daily  insulin glargine Injectable (LANTUS) 20 Unit(s) SubCutaneous every morning  insulin glargine Injectable (LANTUS) 20 Unit(s) SubCutaneous at bedtime  insulin lispro (HumaLOG) corrective regimen sliding scale   SubCutaneous three times a day before meals  insulin lispro (HumaLOG) corrective regimen sliding scale   SubCutaneous at bedtime  levETIRAcetam 250 milliGRAM(s) Oral two times a day  oxybutynin 10 milliGRAM(s) Oral at bedtime  pantoprazole Infusion 8 mG/Hr (10 mL/Hr) IV Continuous <Continuous>  polyethylene glycol 3350 17 Gram(s) Oral two times a day  QUEtiapine 50 milliGRAM(s) Oral daily  rOPINIRole 0.75 milliGRAM(s) Oral three times a day  tamsulosin 0.4 milliGRAM(s) Oral at bedtime      LABS: All Labs Reviewed:                        7.7    8.87  )-----------( 142      ( 01 Sep 2018 07:31 )             23.5     09-01    146<H>  |  112<H>  |  38<H>  ----------------------------<  126<H>  3.5   |  26  |  0.77    Ca    8.5      01 Sep 2018 07:31  Phos  2.3     09-01  Mg     1.9     09-01                Blood Culture: 08-30 @ 08:45  Organism --  Gram Stain Blood -- Gram Stain --  Specimen Source .Urine None  Culture-Blood --        RADIOLOGY/EKG:    < from: CT Abdomen and Pelvis w/ IV Cont (08.30.18 @ 09:54) >    IMPRESSION:     Suspect severe distal esophagitis and reflux. Distended stomach,   correlate for gastroparesis.    < end of copied text >

## 2018-09-04 LAB
ANION GAP SERPL CALC-SCNC: 8 MMOL/L — SIGNIFICANT CHANGE UP (ref 5–17)
BUN SERPL-MCNC: 18 MG/DL — SIGNIFICANT CHANGE UP (ref 7–23)
CALCIUM SERPL-MCNC: 8 MG/DL — LOW (ref 8.5–10.1)
CHLORIDE SERPL-SCNC: 108 MMOL/L — SIGNIFICANT CHANGE UP (ref 96–108)
CO2 SERPL-SCNC: 28 MMOL/L — SIGNIFICANT CHANGE UP (ref 22–31)
CREAT SERPL-MCNC: 0.58 MG/DL — SIGNIFICANT CHANGE UP (ref 0.5–1.3)
GLUCOSE SERPL-MCNC: 88 MG/DL — SIGNIFICANT CHANGE UP (ref 70–99)
HCT VFR BLD CALC: 29.9 % — LOW (ref 39–50)
HGB BLD-MCNC: 10 G/DL — LOW (ref 13–17)
MCHC RBC-ENTMCNC: 30.9 PG — SIGNIFICANT CHANGE UP (ref 27–34)
MCHC RBC-ENTMCNC: 33.4 GM/DL — SIGNIFICANT CHANGE UP (ref 32–36)
MCV RBC AUTO: 92.3 FL — SIGNIFICANT CHANGE UP (ref 80–100)
NRBC # BLD: 0 /100 WBCS — SIGNIFICANT CHANGE UP (ref 0–0)
PLATELET # BLD AUTO: 180 K/UL — SIGNIFICANT CHANGE UP (ref 150–400)
POTASSIUM SERPL-MCNC: 4.3 MMOL/L — SIGNIFICANT CHANGE UP (ref 3.5–5.3)
POTASSIUM SERPL-SCNC: 4.3 MMOL/L — SIGNIFICANT CHANGE UP (ref 3.5–5.3)
RBC # BLD: 3.24 M/UL — LOW (ref 4.2–5.8)
RBC # FLD: 14.8 % — HIGH (ref 10.3–14.5)
SODIUM SERPL-SCNC: 144 MMOL/L — SIGNIFICANT CHANGE UP (ref 135–145)
WBC # BLD: 7.12 K/UL — SIGNIFICANT CHANGE UP (ref 3.8–10.5)
WBC # FLD AUTO: 7.12 K/UL — SIGNIFICANT CHANGE UP (ref 3.8–10.5)

## 2018-09-04 RX ADMIN — QUETIAPINE FUMARATE 50 MILLIGRAM(S): 200 TABLET, FILM COATED ORAL at 11:04

## 2018-09-04 RX ADMIN — Medication 10 MILLIGRAM(S): at 20:51

## 2018-09-04 RX ADMIN — LEVETIRACETAM 250 MILLIGRAM(S): 250 TABLET, FILM COATED ORAL at 04:37

## 2018-09-04 RX ADMIN — CARBIDOPA AND LEVODOPA 1 TABLET(S): 25; 100 TABLET ORAL at 13:56

## 2018-09-04 RX ADMIN — ROPINIROLE 0.75 MILLIGRAM(S): 8 TABLET, FILM COATED, EXTENDED RELEASE ORAL at 04:38

## 2018-09-04 RX ADMIN — ARIPIPRAZOLE 30 MILLIGRAM(S): 15 TABLET ORAL at 11:04

## 2018-09-04 RX ADMIN — ROPINIROLE 0.75 MILLIGRAM(S): 8 TABLET, FILM COATED, EXTENDED RELEASE ORAL at 20:50

## 2018-09-04 RX ADMIN — PIPERACILLIN AND TAZOBACTAM 25 GRAM(S): 4; .5 INJECTION, POWDER, LYOPHILIZED, FOR SOLUTION INTRAVENOUS at 04:38

## 2018-09-04 RX ADMIN — ESCITALOPRAM OXALATE 20 MILLIGRAM(S): 10 TABLET, FILM COATED ORAL at 11:04

## 2018-09-04 RX ADMIN — CARBIDOPA AND LEVODOPA 1 TABLET(S): 25; 100 TABLET ORAL at 09:39

## 2018-09-04 RX ADMIN — PIPERACILLIN AND TAZOBACTAM 25 GRAM(S): 4; .5 INJECTION, POWDER, LYOPHILIZED, FOR SOLUTION INTRAVENOUS at 13:48

## 2018-09-04 RX ADMIN — LEVETIRACETAM 250 MILLIGRAM(S): 250 TABLET, FILM COATED ORAL at 18:21

## 2018-09-04 RX ADMIN — CARBIDOPA AND LEVODOPA 1 TABLET(S): 25; 100 TABLET ORAL at 04:37

## 2018-09-04 RX ADMIN — CARBIDOPA AND LEVODOPA 1 TABLET(S): 25; 100 TABLET ORAL at 18:20

## 2018-09-04 RX ADMIN — DIVALPROEX SODIUM 1000 MILLIGRAM(S): 500 TABLET, DELAYED RELEASE ORAL at 20:51

## 2018-09-04 RX ADMIN — ROPINIROLE 0.75 MILLIGRAM(S): 8 TABLET, FILM COATED, EXTENDED RELEASE ORAL at 13:50

## 2018-09-04 RX ADMIN — DIVALPROEX SODIUM 1000 MILLIGRAM(S): 500 TABLET, DELAYED RELEASE ORAL at 11:04

## 2018-09-04 RX ADMIN — PANTOPRAZOLE SODIUM 10 MG/HR: 20 TABLET, DELAYED RELEASE ORAL at 02:54

## 2018-09-04 RX ADMIN — PANTOPRAZOLE SODIUM 10 MG/HR: 20 TABLET, DELAYED RELEASE ORAL at 21:59

## 2018-09-04 RX ADMIN — PANTOPRAZOLE SODIUM 10 MG/HR: 20 TABLET, DELAYED RELEASE ORAL at 12:22

## 2018-09-04 RX ADMIN — TAMSULOSIN HYDROCHLORIDE 0.4 MILLIGRAM(S): 0.4 CAPSULE ORAL at 20:51

## 2018-09-04 RX ADMIN — Medication 133 MILLILITER(S): at 04:38

## 2018-09-04 RX ADMIN — INSULIN GLARGINE 7 UNIT(S): 100 INJECTION, SOLUTION SUBCUTANEOUS at 20:51

## 2018-09-04 RX ADMIN — Medication 100 MILLIGRAM(S): at 18:21

## 2018-09-04 RX ADMIN — Medication 4: at 18:26

## 2018-09-04 RX ADMIN — POLYETHYLENE GLYCOL 3350 17 GRAM(S): 17 POWDER, FOR SOLUTION ORAL at 18:21

## 2018-09-04 RX ADMIN — PIPERACILLIN AND TAZOBACTAM 25 GRAM(S): 4; .5 INJECTION, POWDER, LYOPHILIZED, FOR SOLUTION INTRAVENOUS at 20:53

## 2018-09-04 NOTE — PROGRESS NOTE ADULT - ASSESSMENT
67yo M pmh HTN, DM-2, PD, Sz dirorder, mod MR BPH, GERD, admitted with esophagitis and uncontrolled hyperglycemia  Patient with moderate MR admitted with uncontrolled diabetes and hematemesis.   Pt was treated in ICU with insulin gtt. No evidence of DKA. No AG. PT sugars now better controlled.   CTAP c/w stercoral colitis/esophagitis    #Acute blood loss anemia   LLL infiltrate suspected asp pna  UGIB  Uncontrolled DM-2  Leukocytosis  Elevated lactate  Esophagitis  Constipation  Stercoral colitis  HTN      Recc:  Give one dose vanco and start zosyn for suspected GN rods and other poss resistant organisms  - day #2 abx, if afebrile will transition to PO tomorrow.   - check AM lactate for clearance.  - s/p 2 u RBC with stable Hb ~10 --> await GI recs for EGD.     Plan for EGD in am after consent obtained D/W HCP Charu [phone] who will await to discuss same with GI in am  - continue bowel regimen: Mineral oil enema  BGM now better controlled  Cont PPI for esophagitis    Dispo; remain inpatient.   Discussed w/ staff.     Total time during encounter: 40 min

## 2018-09-04 NOTE — PROGRESS NOTE ADULT - SUBJECTIVE AND OBJECTIVE BOX
CC: anemia    HPI: Patient with moderate MR admitted with uncontrolled diabetes and hematemesis.   Pt was treated in ICU with insulin gtt. No evidence of DKA. No AG. PT sugars now better controlled.   CTAP c/w stercoral colitis/esophagitis --> awaiting EGD this week.     9/1: pt complaining of cough/congestion. Hgb dropped from 837 to 7.7. No melena or hematochezia or hematemesis o/n.   9/2: Hgb 7. No melena/hematochezia. Made multiple attempts to reach ADRIA Silva at [phone] and brother [phone] with no success. Called head supervisor Lupis Luke who is not at liberty to give consent for emergency blood products- Give the urgency of situation, 2 physician administrative consent has been obtained for PRBC transfusion.   Group home administrators will be attempting to notify family members  9/3: Hgb stable. No BM yet. No abd pain or vomiting. abd distended Good bowel sounds. Spoke with Shira HCP and willing to consent to EGD    9/4: Chart reviewed, seen today w/ bedside nursing, eating lunch, no complaints. Denies abd pain. Had large BM this Am per aide.  ROS: neg unless stated above.     Vital Signs Last 24 Hrs  T(C): 36.9 (04 Sep 2018 13:28), Max: 36.9 (03 Sep 2018 21:53)  T(F): 98.4 (04 Sep 2018 13:28), Max: 98.4 (03 Sep 2018 21:53)  HR: 90 (04 Sep 2018 13:28) (76 - 90)  BP: 135/93 (04 Sep 2018 13:28) (109/80 - 135/93)  BP(mean): --  RR: 17 (04 Sep 2018 13:28) (17 - 18)  SpO2: 98% (04 Sep 2018 13:28) (94% - 98%)    PHYSICAL EXAM:  Constitutional: NAD, awake elderly male slow speech, appears comfortable.  HEENT: PERR, EOMI, Normal Hearing, MMM  Neck: Soft and supple, No LAD, No JVD  Respiratory: Breath sounds are clear bilaterally, No wheezing, rales or rhonchi  Cardiovascular: S1 and S2, regular rate and rhythm, no Murmurs, gallops or rubs  Gastrointestinal: Bowel Sounds present, soft, mildly distended --> nontender to palpation, BS in all 4 quadrants  Extremities: No peripheral edema  Vascular: 2+ peripheral pulses  Neurological: A/O x 2, no focal deficits  Musculoskeletal: moves extremities spontaneously, 5/5 RUE strength, left is flaccid   Skin: No rashes    MEDICATIONS  (STANDING):  ARIPiprazole 30 milliGRAM(s) Oral daily  carbidopa/levodopa  25/100 1 Tablet(s) Oral four times a day  dextrose 5%. 1000 milliLiter(s) (50 mL/Hr) IV Continuous <Continuous>  dextrose 50% Injectable 50 milliLiter(s) IV Push every 15 minutes  dextrose 50% Injectable 25 milliLiter(s) IV Push every 15 minutes  diVALproex ER 1000 milliGRAM(s) Oral daily  diVALproex ER 1000 milliGRAM(s) Oral at bedtime  docusate sodium 100 milliGRAM(s) Oral two times a day  escitalopram 20 milliGRAM(s) Oral daily  insulin glargine Injectable (LANTUS) 7 Unit(s) SubCutaneous at bedtime  insulin lispro (HumaLOG) corrective regimen sliding scale   SubCutaneous three times a day before meals  insulin lispro (HumaLOG) corrective regimen sliding scale   SubCutaneous at bedtime  levETIRAcetam 250 milliGRAM(s) Oral two times a day  mineral oil enema 133 milliLiter(s) Rectal two times a day  oxybutynin 10 milliGRAM(s) Oral at bedtime  pantoprazole Infusion 8 mG/Hr (10 mL/Hr) IV Continuous <Continuous>  piperacillin/tazobactam IVPB. 3.375 Gram(s) IV Intermittent every 8 hours  polyethylene glycol 3350 17 Gram(s) Oral two times a day  QUEtiapine 50 milliGRAM(s) Oral daily  rOPINIRole 0.75 milliGRAM(s) Oral three times a day  tamsulosin 0.4 milliGRAM(s) Oral at bedtime      LABS: All Labs Reviewed:                        10.0   7.12  )-----------( 180      ( 04 Sep 2018 07:24 )             29.9                           7.7    8.87  )-----------( 142      ( 01 Sep 2018 07:31 )             23.5     09-04    144  |  108  |  18  ----------------------------<  88  4.3   |  28  |  0.58    Ca    8.0<L>      04 Sep 2018 07:24      09-01    146<H>  |  112<H>  |  38<H>  ----------------------------<  126<H>  3.5   |  26  |  0.77    Ca    8.5      01 Sep 2018 07:31  Phos  2.3     09-01  Mg     1.9     09-01      RADIOLOGY/EKG:  CT Abdomen and Pelvis w/ IV Cont (08.30.18 @ 09:54) IMPRESSION:     Suspect severe distal esophagitis and reflux. Distended stomach,   correlate for gastroparesis.      Xray Abdomen 2 Views (09.03.18 @ 15:28)   Massive stool-filled distention of the rectosigmoid, unchanged since   August 30, 2018.

## 2018-09-04 NOTE — PROGRESS NOTE ADULT - SUBJECTIVE AND OBJECTIVE BOX
Patient is a 68y old  Male who presents with a chief complaint of vomiting coffee grounds, poor po intact (30 Aug 2018 15:44)      Subective:  No bleeding. Tolerating POs.  No complaints  Reportedly having BMs.    PAST MEDICAL & SURGICAL HISTORY:  Hypertension  Seizures  Parkinson disease  H/O foot surgery      MEDICATIONS  (STANDING):  ARIPiprazole 30 milliGRAM(s) Oral daily  carbidopa/levodopa  25/100 1 Tablet(s) Oral four times a day  dextrose 5%. 1000 milliLiter(s) (50 mL/Hr) IV Continuous <Continuous>  dextrose 50% Injectable 50 milliLiter(s) IV Push every 15 minutes  dextrose 50% Injectable 25 milliLiter(s) IV Push every 15 minutes  diVALproex ER 1000 milliGRAM(s) Oral daily  diVALproex ER 1000 milliGRAM(s) Oral at bedtime  docusate sodium 100 milliGRAM(s) Oral two times a day  escitalopram 20 milliGRAM(s) Oral daily  insulin glargine Injectable (LANTUS) 7 Unit(s) SubCutaneous at bedtime  insulin lispro (HumaLOG) corrective regimen sliding scale   SubCutaneous three times a day before meals  insulin lispro (HumaLOG) corrective regimen sliding scale   SubCutaneous at bedtime  levETIRAcetam 250 milliGRAM(s) Oral two times a day  mineral oil enema 133 milliLiter(s) Rectal two times a day  oxybutynin 10 milliGRAM(s) Oral at bedtime  pantoprazole Infusion 8 mG/Hr (10 mL/Hr) IV Continuous <Continuous>  piperacillin/tazobactam IVPB. 3.375 Gram(s) IV Intermittent every 8 hours  polyethylene glycol 3350 17 Gram(s) Oral two times a day  QUEtiapine 50 milliGRAM(s) Oral daily  rOPINIRole 0.75 milliGRAM(s) Oral three times a day  tamsulosin 0.4 milliGRAM(s) Oral at bedtime    MEDICATIONS  (PRN):  bisacodyl 5 milliGRAM(s) Oral every 12 hours PRN Constipation  dextrose 40% Gel 15 Gram(s) Oral once PRN Blood Glucose LESS THAN 70 milliGRAM(s)/deciliter  glucagon  Injectable 1 milliGRAM(s) IntraMuscular once PRN Glucose LESS THAN 70 milligrams/deciliter      REVIEW OF SYSTEMS:    RESPIRATORY: No shortness of breath  CARDIOVASCULAR: No chest pain  All other review of systems is negative unless indicated above.    Vital Signs Last 24 Hrs  T(C): 36.5 (04 Sep 2018 04:59), Max: 36.9 (03 Sep 2018 21:53)  T(F): 97.7 (04 Sep 2018 04:59), Max: 98.4 (03 Sep 2018 21:53)  HR: 76 (04 Sep 2018 04:59) (76 - 82)  BP: 123/73 (04 Sep 2018 04:59) (96/64 - 123/73)  BP(mean): --  RR: 18 (04 Sep 2018 04:59) (16 - 18)  SpO2: 95% (04 Sep 2018 04:59) (94% - 97%)    PHYSICAL EXAM:    Constitutional: NAD, well-developed  Respiratory: CTAB  Cardiovascular: S1 and S2, RRR  Gastrointestinal: BS+, soft, moderately distended  Extremities: No peripheral edema  Psychiatric: Normal mood, normal affect    LABS:                        10.2   7.82  )-----------( 159      ( 03 Sep 2018 08:03 )             30.1     09-03    145  |  110<H>  |  23  ----------------------------<  54<L>  3.4<L>   |  28  |  0.57    Ca    8.4<L>      03 Sep 2018 08:03            RADIOLOGY & ADDITIONAL STUDIES:

## 2018-09-05 LAB
HCT VFR BLD CALC: 31.9 % — LOW (ref 39–50)
HGB BLD-MCNC: 10.6 G/DL — LOW (ref 13–17)
LACTATE SERPL-SCNC: 1.4 MMOL/L — SIGNIFICANT CHANGE UP (ref 0.7–2)
MCHC RBC-ENTMCNC: 30.9 PG — SIGNIFICANT CHANGE UP (ref 27–34)
MCHC RBC-ENTMCNC: 33.2 GM/DL — SIGNIFICANT CHANGE UP (ref 32–36)
MCV RBC AUTO: 93 FL — SIGNIFICANT CHANGE UP (ref 80–100)
NRBC # BLD: 0 /100 WBCS — SIGNIFICANT CHANGE UP (ref 0–0)
PLATELET # BLD AUTO: 186 K/UL — SIGNIFICANT CHANGE UP (ref 150–400)
RBC # BLD: 3.43 M/UL — LOW (ref 4.2–5.8)
RBC # FLD: 14.4 % — SIGNIFICANT CHANGE UP (ref 10.3–14.5)
WBC # BLD: 6.62 K/UL — SIGNIFICANT CHANGE UP (ref 3.8–10.5)
WBC # FLD AUTO: 6.62 K/UL — SIGNIFICANT CHANGE UP (ref 3.8–10.5)

## 2018-09-05 RX ORDER — PANTOPRAZOLE SODIUM 20 MG/1
40 TABLET, DELAYED RELEASE ORAL
Qty: 0 | Refills: 0 | Status: DISCONTINUED | OUTPATIENT
Start: 2018-09-05 | End: 2018-09-06

## 2018-09-05 RX ADMIN — PIPERACILLIN AND TAZOBACTAM 25 GRAM(S): 4; .5 INJECTION, POWDER, LYOPHILIZED, FOR SOLUTION INTRAVENOUS at 06:50

## 2018-09-05 RX ADMIN — QUETIAPINE FUMARATE 50 MILLIGRAM(S): 200 TABLET, FILM COATED ORAL at 12:17

## 2018-09-05 RX ADMIN — CARBIDOPA AND LEVODOPA 1 TABLET(S): 25; 100 TABLET ORAL at 17:35

## 2018-09-05 RX ADMIN — Medication 10 MILLIGRAM(S): at 21:48

## 2018-09-05 RX ADMIN — POLYETHYLENE GLYCOL 3350 17 GRAM(S): 17 POWDER, FOR SOLUTION ORAL at 17:36

## 2018-09-05 RX ADMIN — Medication 100 MILLIGRAM(S): at 06:49

## 2018-09-05 RX ADMIN — PIPERACILLIN AND TAZOBACTAM 25 GRAM(S): 4; .5 INJECTION, POWDER, LYOPHILIZED, FOR SOLUTION INTRAVENOUS at 14:10

## 2018-09-05 RX ADMIN — LEVETIRACETAM 250 MILLIGRAM(S): 250 TABLET, FILM COATED ORAL at 17:36

## 2018-09-05 RX ADMIN — ARIPIPRAZOLE 30 MILLIGRAM(S): 15 TABLET ORAL at 12:17

## 2018-09-05 RX ADMIN — Medication 100 MILLIGRAM(S): at 17:36

## 2018-09-05 RX ADMIN — LEVETIRACETAM 250 MILLIGRAM(S): 250 TABLET, FILM COATED ORAL at 06:49

## 2018-09-05 RX ADMIN — Medication 2: at 12:16

## 2018-09-05 RX ADMIN — PANTOPRAZOLE SODIUM 40 MILLIGRAM(S): 20 TABLET, DELAYED RELEASE ORAL at 18:13

## 2018-09-05 RX ADMIN — DIVALPROEX SODIUM 1000 MILLIGRAM(S): 500 TABLET, DELAYED RELEASE ORAL at 12:30

## 2018-09-05 RX ADMIN — Medication 2: at 21:51

## 2018-09-05 RX ADMIN — INSULIN GLARGINE 7 UNIT(S): 100 INJECTION, SOLUTION SUBCUTANEOUS at 21:48

## 2018-09-05 RX ADMIN — PIPERACILLIN AND TAZOBACTAM 25 GRAM(S): 4; .5 INJECTION, POWDER, LYOPHILIZED, FOR SOLUTION INTRAVENOUS at 21:51

## 2018-09-05 RX ADMIN — CARBIDOPA AND LEVODOPA 1 TABLET(S): 25; 100 TABLET ORAL at 06:49

## 2018-09-05 RX ADMIN — ROPINIROLE 0.75 MILLIGRAM(S): 8 TABLET, FILM COATED, EXTENDED RELEASE ORAL at 06:49

## 2018-09-05 RX ADMIN — CARBIDOPA AND LEVODOPA 1 TABLET(S): 25; 100 TABLET ORAL at 14:10

## 2018-09-05 RX ADMIN — CARBIDOPA AND LEVODOPA 1 TABLET(S): 25; 100 TABLET ORAL at 09:53

## 2018-09-05 RX ADMIN — POLYETHYLENE GLYCOL 3350 17 GRAM(S): 17 POWDER, FOR SOLUTION ORAL at 06:49

## 2018-09-05 RX ADMIN — ESCITALOPRAM OXALATE 20 MILLIGRAM(S): 10 TABLET, FILM COATED ORAL at 12:17

## 2018-09-05 RX ADMIN — ROPINIROLE 0.75 MILLIGRAM(S): 8 TABLET, FILM COATED, EXTENDED RELEASE ORAL at 14:10

## 2018-09-05 RX ADMIN — ROPINIROLE 0.75 MILLIGRAM(S): 8 TABLET, FILM COATED, EXTENDED RELEASE ORAL at 21:49

## 2018-09-05 RX ADMIN — PANTOPRAZOLE SODIUM 10 MG/HR: 20 TABLET, DELAYED RELEASE ORAL at 08:48

## 2018-09-05 RX ADMIN — TAMSULOSIN HYDROCHLORIDE 0.4 MILLIGRAM(S): 0.4 CAPSULE ORAL at 21:48

## 2018-09-05 RX ADMIN — Medication 2: at 17:11

## 2018-09-05 NOTE — PROGRESS NOTE ADULT - ASSESSMENT
Imp:  Recent hematemesis and esophagitis on CT    Plan  EGD tomorrow  D/W sister in law (brother is not availabe) who agrees. Risks and benefits reviewed and consent obtained by phone

## 2018-09-05 NOTE — PROGRESS NOTE ADULT - ASSESSMENT
69yo M pmh HTN, DM-2, PD, Sz dirorder, mod MR BPH, GERD, admitted with esophagitis and uncontrolled hyperglycemia  Patient with moderate MR admitted with uncontrolled diabetes and hematemesis.   Pt was treated in ICU with insulin gtt. No evidence of DKA. No AG. PT sugars now better controlled.   CTAP c/w stercoral colitis/esophagitis    #Acute blood loss anemia   LLL infiltrate suspected asp pna  UGIB  Uncontrolled DM-2  Leukocytosis  Elevated lactate  Esophagitis  Constipation  Stercoral colitis  HTN      Recc:  Give one dose vanco and start zosyn for suspected GN rods and other poss resistant organisms  - day #2 abx, if afebrile will transition to PO tomorrow.   - check AM lactate for clearance.  - s/p 2 u RBC with stable Hb ~10 --> await GI recs for EGD.     Plan for EGD after consent obtained from [facility] Charu [phone] who will await to discuss same with GI- unable to contact  - continue bowel regimen: Mineral oil enema  BGM now better controlled  Cont PPI for esophagitis    Dispo; remain inpatient.   Discussed w/ staff.

## 2018-09-05 NOTE — PROGRESS NOTE ADULT - SUBJECTIVE AND OBJECTIVE BOX
Patient is a 68y old  Male who presents with a chief complaint of vomiting coffee grounds, poor po intact (05 Sep 2018 17:15)      Subective:  No issues noted, patient resting    PAST MEDICAL & SURGICAL HISTORY:  Hypertension  Seizures  Parkinson disease  H/O foot surgery      MEDICATIONS  (STANDING):  ARIPiprazole 30 milliGRAM(s) Oral daily  carbidopa/levodopa  25/100 1 Tablet(s) Oral four times a day  dextrose 5%. 1000 milliLiter(s) (50 mL/Hr) IV Continuous <Continuous>  dextrose 50% Injectable 50 milliLiter(s) IV Push every 15 minutes  dextrose 50% Injectable 25 milliLiter(s) IV Push every 15 minutes  diVALproex ER 1000 milliGRAM(s) Oral daily  diVALproex ER 1000 milliGRAM(s) Oral at bedtime  docusate sodium 100 milliGRAM(s) Oral two times a day  escitalopram 20 milliGRAM(s) Oral daily  insulin glargine Injectable (LANTUS) 7 Unit(s) SubCutaneous at bedtime  insulin lispro (HumaLOG) corrective regimen sliding scale   SubCutaneous three times a day before meals  insulin lispro (HumaLOG) corrective regimen sliding scale   SubCutaneous at bedtime  levETIRAcetam 250 milliGRAM(s) Oral two times a day  mineral oil enema 133 milliLiter(s) Rectal two times a day  oxybutynin 10 milliGRAM(s) Oral at bedtime  pantoprazole  Injectable 40 milliGRAM(s) IV Push two times a day  piperacillin/tazobactam IVPB. 3.375 Gram(s) IV Intermittent every 8 hours  polyethylene glycol 3350 17 Gram(s) Oral two times a day  QUEtiapine 50 milliGRAM(s) Oral daily  rOPINIRole 0.75 milliGRAM(s) Oral three times a day  tamsulosin 0.4 milliGRAM(s) Oral at bedtime    MEDICATIONS  (PRN):  bisacodyl 5 milliGRAM(s) Oral every 12 hours PRN Constipation  dextrose 40% Gel 15 Gram(s) Oral once PRN Blood Glucose LESS THAN 70 milliGRAM(s)/deciliter  glucagon  Injectable 1 milliGRAM(s) IntraMuscular once PRN Glucose LESS THAN 70 milligrams/deciliter      REVIEW OF SYSTEMS:    RESPIRATORY: No shortness of breath  CARDIOVASCULAR: No chest pain  All other review of systems is negative unless indicated above.    Vital Signs Last 24 Hrs  T(C): 36.5 (05 Sep 2018 10:59), Max: 36.7 (05 Sep 2018 04:05)  T(F): 97.7 (05 Sep 2018 10:59), Max: 98 (05 Sep 2018 04:05)  HR: 86 (05 Sep 2018 12:03) (82 - 86)  BP: 117/81 (05 Sep 2018 10:59) (117/81 - 131/98)  BP(mean): --  RR: 17 (05 Sep 2018 10:59) (17 - 18)  SpO2: 98% (05 Sep 2018 10:59) (98% - 98%)    PHYSICAL EXAM:    Constitutional: NAD, resting  Respiratory: CTAB  Cardiovascular: S1 and S2, RRR  Gastrointestinal: BS+, soft, NT/ND  Psychiatric: resting    LABS:                        10.6   6.62  )-----------( 186      ( 05 Sep 2018 07:15 )             31.9     09-04    144  |  108  |  18  ----------------------------<  88  4.3   |  28  |  0.58    Ca    8.0<L>      04 Sep 2018 07:24            RADIOLOGY & ADDITIONAL STUDIES:

## 2018-09-05 NOTE — PROGRESS NOTE ADULT - SUBJECTIVE AND OBJECTIVE BOX
INTERVAL HPI/OVERNIGHT EVENTS: Patient with moderate MR admitted with uncontrolled diabetes and hematemesis.   Pt was treated in ICU with insulin gtt. No evidence of DKA. No AG. PT sugars now better controlled.   CTAP c/w stercoral colitis/esophagitis --> awaiting EGD this week.     9/1: pt complaining of cough/congestion. Hgb dropped from 837 to 7.7. No melena or hematochezia or hematemesis o/n.   9/2: Hgb 7. No melena/hematochezia. Made multiple attempts to reach ADRIA Silva at [phone] and brother [phone] with no success. Called head supervisor Lupis Luke who is not at liberty to give consent for emergency blood products- Give the urgency of situation, 2 physician administrative consent has been obtained for PRBC transfusion.   Group home administrators will be attempting to notify family members  9/3: Hgb stable. No BM yet. No abd pain or vomiting. abd distended Good bowel sounds. Spoke with Shira HCP and willing to consent to EGD    9/4: Chart reviewed, seen today w/ bedside nursing, eating lunch, no complaints. Denies abd pain. Had large BM this Am per aide.  9/5: Patient seen and examined at bedside. Aide form home at bedside, states patient more alert and awake today.    MEDICATIONS  (STANDING):  ARIPiprazole 30 milliGRAM(s) Oral daily  carbidopa/levodopa  25/100 1 Tablet(s) Oral four times a day  dextrose 5%. 1000 milliLiter(s) (50 mL/Hr) IV Continuous <Continuous>  dextrose 50% Injectable 50 milliLiter(s) IV Push every 15 minutes  dextrose 50% Injectable 25 milliLiter(s) IV Push every 15 minutes  diVALproex ER 1000 milliGRAM(s) Oral daily  diVALproex ER 1000 milliGRAM(s) Oral at bedtime  docusate sodium 100 milliGRAM(s) Oral two times a day  escitalopram 20 milliGRAM(s) Oral daily  insulin glargine Injectable (LANTUS) 7 Unit(s) SubCutaneous at bedtime  insulin lispro (HumaLOG) corrective regimen sliding scale   SubCutaneous three times a day before meals  insulin lispro (HumaLOG) corrective regimen sliding scale   SubCutaneous at bedtime  levETIRAcetam 250 milliGRAM(s) Oral two times a day  mineral oil enema 133 milliLiter(s) Rectal two times a day  oxybutynin 10 milliGRAM(s) Oral at bedtime  pantoprazole Infusion 8 mG/Hr (10 mL/Hr) IV Continuous <Continuous>  piperacillin/tazobactam IVPB. 3.375 Gram(s) IV Intermittent every 8 hours  polyethylene glycol 3350 17 Gram(s) Oral two times a day  QUEtiapine 50 milliGRAM(s) Oral daily  rOPINIRole 0.75 milliGRAM(s) Oral three times a day  tamsulosin 0.4 milliGRAM(s) Oral at bedtime    MEDICATIONS  (PRN):  bisacodyl 5 milliGRAM(s) Oral every 12 hours PRN Constipation  dextrose 40% Gel 15 Gram(s) Oral once PRN Blood Glucose LESS THAN 70 milliGRAM(s)/deciliter  glucagon  Injectable 1 milliGRAM(s) IntraMuscular once PRN Glucose LESS THAN 70 milligrams/deciliter      Allergies    No Known Allergies    Intolerances      ROS:  CONSTITUTIONAL: + weakness, no fevers or chills  EYES/ENT: No visual changes;  No vertigo or throat pain   NECK: No pain or stiffness  RESPIRATORY: No cough, wheezing, No shortness of breath  CARDIOVASCULAR: No chest pain or palpitations  GASTROINTESTINAL: No abdominal or epigastric pain. No nausea, vomiting, or hematemesis; No diarrhea or constipation.   GENITOURINARY: No dysuria, frequency or hematuria  NEUROLOGICAL: No numbness  SKIN: No itching, burning, rashes, or lesions   All other review of systems is negative unless indicated above.    Vital Signs Last 24 Hrs  T(C): 36.5 (05 Sep 2018 10:59), Max: 36.7 (05 Sep 2018 04:05)  T(F): 97.7 (05 Sep 2018 10:59), Max: 98 (05 Sep 2018 04:05)  HR: 86 (05 Sep 2018 12:03) (82 - 86)  BP: 117/81 (05 Sep 2018 10:59) (117/81 - 131/98)  BP(mean): --  RR: 17 (05 Sep 2018 10:59) (17 - 18)  SpO2: 98% (05 Sep 2018 10:59) (98% - 98%)    Physical Exam:  General: WN/WD NAD  Neurology: A&Ox2, nonfocal, MCKNIGHT x 4  Respiratory: CTA B/L  CV: RRR, S1S2, no murmurs, rubs or gallops  Abdominal: Soft, NT, ND +BS  Extremities: No edema, + peripheral pulses      LABS:                        10.6   6.62  )-----------( 186      ( 05 Sep 2018 07:15 )             31.9     09-04    144  |  108  |  18  ----------------------------<  88  4.3   |  28  |  0.58    Ca    8.0<L>      04 Sep 2018 07:24            RADIOLOGY & ADDITIONAL TESTS:

## 2018-09-06 ENCOUNTER — RESULT REVIEW (OUTPATIENT)
Age: 68
End: 2018-09-06

## 2018-09-06 ENCOUNTER — TRANSCRIPTION ENCOUNTER (OUTPATIENT)
Age: 68
End: 2018-09-06

## 2018-09-06 LAB
HCT VFR BLD CALC: 29.6 % — LOW (ref 39–50)
HGB BLD-MCNC: 10.1 G/DL — LOW (ref 13–17)
MCHC RBC-ENTMCNC: 31.4 PG — SIGNIFICANT CHANGE UP (ref 27–34)
MCHC RBC-ENTMCNC: 34.1 GM/DL — SIGNIFICANT CHANGE UP (ref 32–36)
MCV RBC AUTO: 91.9 FL — SIGNIFICANT CHANGE UP (ref 80–100)
NRBC # BLD: 0 /100 WBCS — SIGNIFICANT CHANGE UP (ref 0–0)
PLATELET # BLD AUTO: 218 K/UL — SIGNIFICANT CHANGE UP (ref 150–400)
RBC # BLD: 3.22 M/UL — LOW (ref 4.2–5.8)
RBC # FLD: 13.8 % — SIGNIFICANT CHANGE UP (ref 10.3–14.5)
WBC # BLD: 6.08 K/UL — SIGNIFICANT CHANGE UP (ref 3.8–10.5)
WBC # FLD AUTO: 6.08 K/UL — SIGNIFICANT CHANGE UP (ref 3.8–10.5)

## 2018-09-06 PROCEDURE — 88305 TISSUE EXAM BY PATHOLOGIST: CPT | Mod: 26

## 2018-09-06 PROCEDURE — 88313 SPECIAL STAINS GROUP 2: CPT | Mod: 26

## 2018-09-06 PROCEDURE — 88312 SPECIAL STAINS GROUP 1: CPT | Mod: 26

## 2018-09-06 RX ORDER — PANTOPRAZOLE SODIUM 20 MG/1
1 TABLET, DELAYED RELEASE ORAL
Qty: 60 | Refills: 0 | OUTPATIENT
Start: 2018-09-06 | End: 2018-10-05

## 2018-09-06 RX ORDER — PANTOPRAZOLE SODIUM 20 MG/1
40 TABLET, DELAYED RELEASE ORAL
Qty: 0 | Refills: 0 | Status: DISCONTINUED | OUTPATIENT
Start: 2018-09-06 | End: 2018-09-10

## 2018-09-06 RX ORDER — CEFUROXIME AXETIL 250 MG
1 TABLET ORAL
Qty: 12 | Refills: 0 | OUTPATIENT
Start: 2018-09-06 | End: 2018-09-11

## 2018-09-06 RX ADMIN — Medication 100 MILLIGRAM(S): at 05:42

## 2018-09-06 RX ADMIN — PIPERACILLIN AND TAZOBACTAM 25 GRAM(S): 4; .5 INJECTION, POWDER, LYOPHILIZED, FOR SOLUTION INTRAVENOUS at 05:42

## 2018-09-06 RX ADMIN — CARBIDOPA AND LEVODOPA 1 TABLET(S): 25; 100 TABLET ORAL at 13:47

## 2018-09-06 RX ADMIN — ROPINIROLE 0.75 MILLIGRAM(S): 8 TABLET, FILM COATED, EXTENDED RELEASE ORAL at 05:41

## 2018-09-06 RX ADMIN — INSULIN GLARGINE 7 UNIT(S): 100 INJECTION, SOLUTION SUBCUTANEOUS at 22:05

## 2018-09-06 RX ADMIN — POLYETHYLENE GLYCOL 3350 17 GRAM(S): 17 POWDER, FOR SOLUTION ORAL at 17:16

## 2018-09-06 RX ADMIN — Medication 10 MILLIGRAM(S): at 22:06

## 2018-09-06 RX ADMIN — DIVALPROEX SODIUM 1000 MILLIGRAM(S): 500 TABLET, DELAYED RELEASE ORAL at 22:25

## 2018-09-06 RX ADMIN — CARBIDOPA AND LEVODOPA 1 TABLET(S): 25; 100 TABLET ORAL at 05:42

## 2018-09-06 RX ADMIN — QUETIAPINE FUMARATE 50 MILLIGRAM(S): 200 TABLET, FILM COATED ORAL at 12:11

## 2018-09-06 RX ADMIN — POLYETHYLENE GLYCOL 3350 17 GRAM(S): 17 POWDER, FOR SOLUTION ORAL at 05:41

## 2018-09-06 RX ADMIN — Medication 4: at 12:11

## 2018-09-06 RX ADMIN — LEVETIRACETAM 250 MILLIGRAM(S): 250 TABLET, FILM COATED ORAL at 17:16

## 2018-09-06 RX ADMIN — PANTOPRAZOLE SODIUM 40 MILLIGRAM(S): 20 TABLET, DELAYED RELEASE ORAL at 05:42

## 2018-09-06 RX ADMIN — CARBIDOPA AND LEVODOPA 1 TABLET(S): 25; 100 TABLET ORAL at 10:18

## 2018-09-06 RX ADMIN — Medication 5 MILLIGRAM(S): at 13:48

## 2018-09-06 RX ADMIN — PIPERACILLIN AND TAZOBACTAM 25 GRAM(S): 4; .5 INJECTION, POWDER, LYOPHILIZED, FOR SOLUTION INTRAVENOUS at 13:47

## 2018-09-06 RX ADMIN — ROPINIROLE 0.75 MILLIGRAM(S): 8 TABLET, FILM COATED, EXTENDED RELEASE ORAL at 22:05

## 2018-09-06 RX ADMIN — PANTOPRAZOLE SODIUM 40 MILLIGRAM(S): 20 TABLET, DELAYED RELEASE ORAL at 17:16

## 2018-09-06 RX ADMIN — CARBIDOPA AND LEVODOPA 1 TABLET(S): 25; 100 TABLET ORAL at 17:17

## 2018-09-06 RX ADMIN — ARIPIPRAZOLE 30 MILLIGRAM(S): 15 TABLET ORAL at 12:11

## 2018-09-06 RX ADMIN — PIPERACILLIN AND TAZOBACTAM 25 GRAM(S): 4; .5 INJECTION, POWDER, LYOPHILIZED, FOR SOLUTION INTRAVENOUS at 22:06

## 2018-09-06 RX ADMIN — Medication 2: at 10:16

## 2018-09-06 RX ADMIN — DIVALPROEX SODIUM 1000 MILLIGRAM(S): 500 TABLET, DELAYED RELEASE ORAL at 03:32

## 2018-09-06 RX ADMIN — DIVALPROEX SODIUM 1000 MILLIGRAM(S): 500 TABLET, DELAYED RELEASE ORAL at 12:11

## 2018-09-06 RX ADMIN — Medication 100 MILLIGRAM(S): at 17:17

## 2018-09-06 RX ADMIN — ESCITALOPRAM OXALATE 20 MILLIGRAM(S): 10 TABLET, FILM COATED ORAL at 12:11

## 2018-09-06 RX ADMIN — ROPINIROLE 0.75 MILLIGRAM(S): 8 TABLET, FILM COATED, EXTENDED RELEASE ORAL at 13:47

## 2018-09-06 RX ADMIN — TAMSULOSIN HYDROCHLORIDE 0.4 MILLIGRAM(S): 0.4 CAPSULE ORAL at 22:06

## 2018-09-06 RX ADMIN — LEVETIRACETAM 250 MILLIGRAM(S): 250 TABLET, FILM COATED ORAL at 05:41

## 2018-09-06 NOTE — DISCHARGE NOTE ADULT - ADDITIONAL INSTRUCTIONS
-Continue protonix 40mg twice a day  -Complete course of antibiotics  -Follow up with GI within 2 weeks of discharge  -Follow up with PCP within 2 weeks of discharge  -Resume all medications, diet and activities. Patient may return to program. -Continue protonix 40mg twice a day  -Complete course of antibiotics  -Follow up with GI within 2 weeks of discharge  -Follow up with PCP within 2 weeks of discharge  -Resume all medications, diet and activities. Patient may return to program.  -follow blood pressure at PCP

## 2018-09-06 NOTE — DISCHARGE NOTE ADULT - MEDICATION SUMMARY - MEDICATIONS TO TAKE
I will START or STAY ON the medications listed below when I get home from the hospital:    acetaminophen 500 mg oral tablet  -- 2 tab(s) by mouth every 8 hours, As Needed - for moderate pain  -- Indication: For pain    Aspirin Low Dose 81 mg oral delayed release tablet  -- 1 tab(s) by mouth once a day  -- Indication: For aspirin    tamsulosin 0.4 mg oral capsule  -- 1 cap(s) by mouth 2 times a day  -- Indication: For bph    Keppra 250 mg oral tablet  -- 1 tab(s) by mouth 2 times a day  -- Indication: For seizure    divalproex sodium 500 mg oral tablet, extended release  -- 2 tab(s) by mouth 2 times a day  -- Indication: For seizure    escitalopram 20 mg oral tablet  -- 2 tab(s) by mouth once a day (at bedtime)  -- Indication: For Depression    metFORMIN 1000 mg oral tablet  -- 1 tab(s) by mouth 2 times a day (with meals)  -- Indication: For Dm    loratadine 10 mg oral tablet  -- 1 tab(s) by mouth once a day  -- Indication: For allergies    rOPINIRole 0.25 mg oral tablet  -- 3 tab(s) by mouth 3 times a day  -- Indication: For parkinsons    carbidopa-levodopa 25 mg-100 mg oral tablet  -- 1 tab(s) by mouth 4 times a day [6 am, 10 am, 2 pm, 6 pm]  -- Indication: For parkinson    ARIPiprazole 30 mg oral tablet  -- 1 tab(s) by mouth once a day  -- Indication: For psych    QUEtiapine 50 mg oral tablet  -- 1 tab(s) by mouth once a day  -- Indication: For psych    ciclopirox 0.77% topical cream  -- Apply on skin to affected area 2 times a day  -- Indication: For rash    Balmex topical ointment  -- Apply on skin to affected area [diaper area and lower cortal area], As Needed - for rash with every diaper change  -- Indication: For rash    ketoconazole 2% topical cream  -- Apply on skin to affected area [face] 2 times a day  -- Indication: For rash    docusate sodium 100 mg oral capsule  -- 2 cap(s) by mouth once a day  -- Indication: For constipation    Sodium Chloride 1000 mg oral tablet, soluble  -- 1 tab(s) by mouth once a day  -- Indication: For minerals    Augmentin 875 mg-125 mg oral tablet  -- 1 tab(s) by mouth every 12 hours   -- Finish all this medication unless otherwise directed by prescriber.  Take with food or milk.    -- Indication: For pna    pantoprazole 40 mg oral delayed release tablet  -- 1 tab(s) by mouth 2 times a day  -- Indication: For esophagitis    oxybutynin 10 mg/24 hr oral tablet, extended release  -- 1 tab(s) by mouth once a day (at bedtime)  -- Indication: For bph    Calcium 600+D 600 mg-200 intl units oral tablet  -- 1 tab(s) by mouth 2 times a day  -- Indication: For vitamin    cholecalciferol 1000 intl units oral tablet  -- 1 tab(s) by mouth once a day  -- Indication: For vitamin I will START or STAY ON the medications listed below when I get home from the hospital:    acetaminophen 500 mg oral tablet  -- 2 tab(s) by mouth every 8 hours, As Needed - for moderate pain  -- Indication: For pain    Aspirin Low Dose 81 mg oral delayed release tablet  -- 1 tab(s) by mouth once a day  -- Indication: For aspirin    tamsulosin 0.4 mg oral capsule  -- 1 cap(s) by mouth 2 times a day  -- Indication: For bph    Keppra 250 mg oral tablet  -- 1 tab(s) by mouth 2 times a day  -- Indication: For seizure    divalproex sodium 500 mg oral tablet, extended release  -- 2 tab(s) by mouth 2 times a day  -- Indication: For seizure    escitalopram 20 mg oral tablet  -- 2 tab(s) by mouth once a day (at bedtime)  -- Indication: For Depression    metFORMIN 1000 mg oral tablet  -- 1 tab(s) by mouth 2 times a day (with meals)  -- Indication: For Dm    loratadine 10 mg oral tablet  -- 1 tab(s) by mouth once a day  -- Indication: For allergies    rOPINIRole 0.25 mg oral tablet  -- 3 tab(s) by mouth 3 times a day  -- Indication: For parkinsons    carbidopa-levodopa 25 mg-100 mg oral tablet  -- 1 tab(s) by mouth 4 times a day [6 am, 10 am, 2 pm, 6 pm]  -- Indication: For parkinson    ARIPiprazole 30 mg oral tablet  -- 1 tab(s) by mouth once a day  -- Indication: For psych    QUEtiapine 50 mg oral tablet  -- 1 tab(s) by mouth once a day  -- Indication: For psych    ciclopirox 0.77% topical cream  -- Apply on skin to affected area 2 times a day  -- Indication: For rash    Balmex topical ointment  -- Apply on skin to affected area [diaper area and lower cortal area], As Needed - for rash with every diaper change  -- Indication: For rash    ketoconazole 2% topical cream  -- Apply on skin to affected area [face] 2 times a day  -- Indication: For rash    docusate sodium 100 mg oral capsule  -- 2 cap(s) by mouth once a day  -- Indication: For constipation    Sodium Chloride 1000 mg oral tablet, soluble  -- 1 tab(s) by mouth once a day  -- Indication: For minerals    Augmentin 875 mg-125 mg oral tablet  -- 1 tab(s) by mouth every 12 hours   -- Finish all this medication unless otherwise directed by prescriber.  Take with food or milk.    -- Indication: For pna    lactobacillus acidophilus oral capsule  -- 1 cap(s) by mouth 3 times a day (with meals)   -- Indication: For GI ppx    pantoprazole 40 mg oral delayed release tablet  -- 1 tab(s) by mouth 2 times a day  -- Indication: For esophagitis    oxybutynin 10 mg/24 hr oral tablet, extended release  -- 1 tab(s) by mouth once a day (at bedtime)  -- Indication: For bph    Calcium 600+D 600 mg-200 intl units oral tablet  -- 1 tab(s) by mouth 2 times a day  -- Indication: For vitamin    cholecalciferol 1000 intl units oral tablet  -- 1 tab(s) by mouth once a day  -- Indication: For vitamin I will START or STAY ON the medications listed below when I get home from the hospital:    acetaminophen 500 mg oral tablet  -- 2 tab(s) by mouth every 8 hours, As Needed - for moderate pain  -- Indication: For pain    aspirin 81 mg oral delayed release tablet  -- 1 tab(s) by mouth once a day  -- Indication: For heart health    tamsulosin 0.4 mg oral capsule  -- 1 cap(s) by mouth 2 times a day  -- Indication: For bph    Keppra 250 mg oral tablet  -- 1 tab(s) by mouth 2 times a day  -- Indication: For seizure    divalproex sodium 500 mg oral tablet, extended release  -- 2 tab(s) by mouth 2 times a day  -- Indication: For seizure    escitalopram 20 mg oral tablet  -- 2 tab(s) by mouth once a day (at bedtime)  -- Indication: For Depression    metFORMIN 1000 mg oral tablet  -- 1 tab(s) by mouth 2 times a day (with meals)  -- Indication: For Dm    loratadine 10 mg oral tablet  -- 1 tab(s) by mouth once a day  -- Indication: For allergies    rOPINIRole 0.25 mg oral tablet  -- 3 tab(s) by mouth 3 times a day  -- Indication: For parkinsons    carbidopa-levodopa 25 mg-100 mg oral tablet  -- 1 tab(s) by mouth 4 times a day [6 am, 10 am, 2 pm, 6 pm]  -- Indication: For parkinson    ARIPiprazole 30 mg oral tablet  -- 1 tab(s) by mouth once a day  -- Indication: For psych    QUEtiapine 50 mg oral tablet  -- 1 tab(s) by mouth once a day  -- Indication: For psych    ciclopirox 0.77% topical cream  -- Apply on skin to affected area 2 times a day  -- Indication: For rash    Balmex topical ointment  -- Apply on skin to affected area [diaper area and lower cortal area], As Needed - for rash with every diaper change  -- Indication: For rash    ketoconazole 2% topical cream  -- Apply on skin to affected area [face] 2 times a day  -- Indication: For rash    docusate sodium 100 mg oral capsule  -- 2 cap(s) by mouth once a day  -- Indication: For constipation    Sodium Chloride 1000 mg oral tablet, soluble  -- 1 tab(s) by mouth once a day  -- Indication: For minerals    Augmentin 875 mg-125 mg oral tablet  -- 1 tab(s) by mouth every 12 hours   -- Finish all this medication unless otherwise directed by prescriber.  Take with food or milk.    -- Indication: For pna    lactobacillus acidophilus oral capsule  -- 1 cap(s) by mouth 3 times a day (with meals)   -- Indication: For GI ppx    pantoprazole 40 mg oral delayed release tablet  -- 1 tab(s) by mouth 2 times a day  -- Indication: For esophagitis    oxybutynin 10 mg/24 hr oral tablet, extended release  -- 1 tab(s) by mouth once a day (at bedtime)  -- Indication: For bph    Calcium 600+D 600 mg-200 intl units oral tablet  -- 1 tab(s) by mouth 2 times a day  -- Indication: For vitamin    cholecalciferol 1000 intl units oral tablet  -- 1 tab(s) by mouth once a day  -- Indication: For vitamin

## 2018-09-06 NOTE — DISCHARGE NOTE ADULT - MEDICATION SUMMARY - MEDICATIONS TO CHANGE
I will SWITCH the dose or number of times a day I take the medications listed below when I get home from the hospital:    ARIPiprazole 10 mg oral tablet  -- 1 tab(s) by mouth once a day    ARIPiprazole 20 mg oral tablet  -- 1 tab(s) by mouth once a day I will SWITCH the dose or number of times a day I take the medications listed below when I get home from the hospital:  None

## 2018-09-06 NOTE — DISCHARGE NOTE ADULT - CARE PROVIDER_API CALL
PCP at group home,   Phone: (   )    -  Fax: (   )    -    [facility]), Gastroenterology; Internal Medicine  [address] [place] [address]  Phone: [phone]  Fax: [phone]

## 2018-09-06 NOTE — DISCHARGE NOTE ADULT - PLAN OF CARE
no more GI bleeding -Continue protonix 40mg twice a day  -Follow up with GI within 2 weeks of discharge  -Follow up with PCP within 2 weeks of discharge  -Resume all medications, diet and activities. Patient may return to program. -Complete course of antibiotics  -Follow up with PCP within 2 weeks of discharge  -Resume all medications, diet and activities. Patient may return to program.

## 2018-09-06 NOTE — DISCHARGE NOTE ADULT - PATIENT PORTAL LINK FT
You can access the [facility] Patient Portal, offered by [facility], by registering with the following website: [email or web address]

## 2018-09-06 NOTE — PROGRESS NOTE ADULT - SUBJECTIVE AND OBJECTIVE BOX
EGD:  Severe reflux esophagitis  O/W normal    Rec:  Protonix or equivalent PPI bid indefinitely  Reconsult prn

## 2018-09-06 NOTE — DISCHARGE NOTE ADULT - HOSPITAL COURSE
Patient with moderate MR admitted with uncontrolled diabetes and hematemesis.   Pt was treated in ICU with insulin gtt. No evidence of DKA. No AG. PT sugars now better controlled.   CTAP c/w stercoral colitis/esophagitis --> awaiting EGD this week.     9/1: pt complaining of cough/congestion. Hgb dropped from 837 to 7.7. No melena or hematochezia or hematemesis o/n.   9/2: Hgb 7. No melena/hematochezia. Made multiple attempts to reach ADRIA Silva at [phone] and brother [phone] with no success. Called head supervisor Lupis Luke who is not at liberty to give consent for emergency blood products- Give the urgency of situation, 2 physician administrative consent has been obtained for PRBC transfusion.   Group home administrators will be attempting to notify family members  9/3: Hgb stable. No BM yet. No abd pain or vomiting. abd distended Good bowel sounds. Spoke with Shira HCP and willing to consent to EGD    9/4: Chart reviewed, seen today w/ bedside nursing, eating lunch, no complaints. Denies abd pain. Had large BM this Am per aide.  9/5: Patient seen and examined at bedside. Aide form home at bedside, states patient more alert and awake today.  9/6: Patient had EGD today showing severe esophagitis, no active bleeding. Patient states he feels well.    Physical Exam on day of discharge:  General: WN/WD NAD  Neurology: A&Ox2, nonfocal, MCKNIGHT x 4  Respiratory: CTA B/L  CV: RRR, S1S2, no murmurs, rubs or gallops  Abdominal: Soft, NT, ND +BS  Extremities: No edema, + peripheral pulses    #Acute blood loss anemia   LLL infiltrate suspected asp pna  UGIB  Uncontrolled DM-2  Leukocytosis  Elevated lactate  Esophagitis  Constipation  Stercoral colitis  HTN      Recc:  Give one dose vanco and start zosyn for suspected GN rods and other poss resistant organisms  - complete course of antibiotics.   - s/p 2 u RBC with stable Hb ~10 --> patient had EGD- showing esophagitis- no active bleeding, can start PO protonix BID, can restart ASA   BGM now better controlled    Total time spent on discharge: 48 minutes Patient with moderate MR admitted with uncontrolled diabetes and hematemesis.   Pt was treated in ICU with insulin gtt. No evidence of DKA. No AG. PT sugars now better controlled.   CTAP c/w stercoral colitis/esophagitis --> awaiting EGD this week.     9/1: pt complaining of cough/congestion. Hgb dropped from 837 to 7.7. No melena or hematochezia or hematemesis o/n.   9/2: Hgb 7. No melena/hematochezia. Made multiple attempts to reach ADRIA Silva at [phone] and brother [phone] with no success. Called head supervisor Lupis Luke who is not at liberty to give consent for emergency blood products- Give the urgency of situation, 2 physician administrative consent has been obtained for PRBC transfusion.   Group home administrators will be attempting to notify family members  9/3: Hgb stable. No BM yet. No abd pain or vomiting. abd distended Good bowel sounds. Spoke with Shira HCP and willing to consent to EGD    9/4: Chart reviewed, seen today w/ bedside nursing, eating lunch, no complaints. Denies abd pain. Had large BM this Am per aide.  9/5: Patient seen and examined at bedside. Aide form home at bedside, states patient more alert and awake today.  9/6: Patient had EGD today showing severe esophagitis, no active bleeding. Patient states he feels well.  9/7: Patient seen and examined at bedside. Feels well, no complaints. Patient noted to have increased redness on R wrist, possible IV infiltration?, recommended to elevate arm warm/cold compresses    Physical Exam on day of discharge:  General: WN/WD NAD  Neurology: A&Ox2, nonfocal, MCKNIGHT x 4  Respiratory: CTA B/L  CV: RRR, S1S2, no murmurs, rubs or gallops  Abdominal: Soft, NT, ND +BS  Extremities: No edema, + peripheral pulses, + erythema RUE    #Acute blood loss anemia   LLL infiltrate suspected asp pna  UGIB  Uncontrolled DM-2  Leukocytosis  Elevated lactate  Esophagitis  Constipation  Stercoral colitis  HTN      Recc:  Give one dose vanco and start zosyn for suspected GN rods and other poss resistant organisms  - complete course of antibiotics.   - s/p 2 u RBC with stable Hb ~10 --> patient had EGD- showing esophagitis- no active bleeding, can start PO protonix BID, can restart ASA   BGM now better controlled    Total time spent on discharge: 48 minutes Patient with moderate MR admitted with uncontrolled diabetes and hematemesis.   Pt was treated in ICU with insulin gtt. No evidence of DKA. No AG. PT sugars now better controlled.   CTAP c/w stercoral colitis/esophagitis --> awaiting EGD this week.     9/1: pt complaining of cough/congestion. Hgb dropped from 837 to 7.7. No melena or hematochezia or hematemesis o/n.   9/2: Hgb 7. No melena/hematochezia. Made multiple attempts to reach ADRIA Silva at [phone] and brother [phone] with no success. Called head supervisor Lupis Luke who is not at liberty to give consent for emergency blood products- Give the urgency of situation, 2 physician administrative consent has been obtained for PRBC transfusion.   Group home administrators will be attempting to notify family members  9/3: Hgb stable. No BM yet. No abd pain or vomiting. abd distended Good bowel sounds. Spoke with Shira HCP and willing to consent to EGD    9/4: Chart reviewed, seen today w/ bedside nursing, eating lunch, no complaints. Denies abd pain. Had large BM this Am per aide.  9/5: Patient seen and examined at bedside. Aide form home at bedside, states patient more alert and awake today.  9/6: Patient had EGD today showing severe esophagitis, no active bleeding. Patient states he feels well.  9/7: Patient seen and examined at bedside. Feels well, no complaints. Patient noted to have increased redness on R wrist, possible IV infiltration?, recommended to elevate arm warm/cold compresses    pt's group home was not amenable to  for 3 days, so discharge delayed    Physical Exam on day of discharge:  General: WN/WD NAD  Neurology: A&Ox2, nonfocal, MCKNIGHT x 4  Respiratory: CTA B/L  CV: RRR, S1S2, no murmurs, rubs or gallops  Abdominal: Soft, NT, ND +BS  Extremities: No edema, + peripheral pulses, + erythema RUE    #Acute blood loss anemia   LLL infiltrate suspected asp pna  UGIB  Uncontrolled DM-2  Leukocytosis  Elevated lactate  Esophagitis  Constipation  Stercoral colitis  HTN      Recc:  Give one dose vanco and start zosyn for suspected GN rods and other poss resistant organisms  - complete course of antibiotics.   - s/p 2 u RBC with stable Hb ~10 --> patient had EGD- showing esophagitis- no active bleeding, can start PO protonix BID, can restart ASA   BGM now better controlled    Total time spent on discharge: 48 minutes

## 2018-09-06 NOTE — PROGRESS NOTE ADULT - SUBJECTIVE AND OBJECTIVE BOX
INTERVAL HPI/OVERNIGHT EVENTS: Patient with moderate MR admitted with uncontrolled diabetes and hematemesis.   Pt was treated in ICU with insulin gtt. No evidence of DKA. No AG. PT sugars now better controlled.   CTAP c/w stercoral colitis/esophagitis --> awaiting EGD this week.     9/1: pt complaining of cough/congestion. Hgb dropped from 837 to 7.7. No melena or hematochezia or hematemesis o/n.   9/2: Hgb 7. No melena/hematochezia. Made multiple attempts to reach ADRIA Silva at [phone] and brother [phone] with no success. Called head supervisor Lupis Luke who is not at liberty to give consent for emergency blood products- Give the urgency of situation, 2 physician administrative consent has been obtained for PRBC transfusion.   Group home administrators will be attempting to notify family members  9/3: Hgb stable. No BM yet. No abd pain or vomiting. abd distended Good bowel sounds. Spoke with Shira HCP and willing to consent to EGD    9/4: Chart reviewed, seen today w/ bedside nursing, eating lunch, no complaints. Denies abd pain. Had large BM this Am per aide.  9/5: Patient seen and examined at bedside. Aide form home at bedside, states patient more alert and awake today.  9/6: Patient had EGD today showing severe esophagitis, no active bleeding. Patient states he feels well.      MEDICATIONS  (STANDING):  ARIPiprazole 30 milliGRAM(s) Oral daily  carbidopa/levodopa  25/100 1 Tablet(s) Oral four times a day  dextrose 5%. 1000 milliLiter(s) (50 mL/Hr) IV Continuous <Continuous>  dextrose 50% Injectable 50 milliLiter(s) IV Push every 15 minutes  dextrose 50% Injectable 25 milliLiter(s) IV Push every 15 minutes  diVALproex ER 1000 milliGRAM(s) Oral daily  diVALproex ER 1000 milliGRAM(s) Oral at bedtime  docusate sodium 100 milliGRAM(s) Oral two times a day  escitalopram 20 milliGRAM(s) Oral daily  insulin glargine Injectable (LANTUS) 7 Unit(s) SubCutaneous at bedtime  insulin lispro (HumaLOG) corrective regimen sliding scale   SubCutaneous three times a day before meals  insulin lispro (HumaLOG) corrective regimen sliding scale   SubCutaneous at bedtime  levETIRAcetam 250 milliGRAM(s) Oral two times a day  mineral oil enema 133 milliLiter(s) Rectal two times a day  oxybutynin 10 milliGRAM(s) Oral at bedtime  pantoprazole    Tablet 40 milliGRAM(s) Oral two times a day  piperacillin/tazobactam IVPB. 3.375 Gram(s) IV Intermittent every 8 hours  polyethylene glycol 3350 17 Gram(s) Oral two times a day  QUEtiapine 50 milliGRAM(s) Oral daily  rOPINIRole 0.75 milliGRAM(s) Oral three times a day  tamsulosin 0.4 milliGRAM(s) Oral at bedtime    MEDICATIONS  (PRN):  bisacodyl 5 milliGRAM(s) Oral every 12 hours PRN Constipation  dextrose 40% Gel 15 Gram(s) Oral once PRN Blood Glucose LESS THAN 70 milliGRAM(s)/deciliter  glucagon  Injectable 1 milliGRAM(s) IntraMuscular once PRN Glucose LESS THAN 70 milligrams/deciliter      Allergies    No Known Allergies    Intolerances      ROS limited 2/2 MR but denies SOB, CP, palpitations, abd pain.    Vital Signs Last 24 Hrs  T(C): 36.4 (06 Sep 2018 12:16), Max: 37.3 (06 Sep 2018 08:02)  T(F): 97.6 (06 Sep 2018 12:16), Max: 99.2 (06 Sep 2018 08:02)  HR: 89 (06 Sep 2018 12:16) (74 - 95)  BP: 108/70 (06 Sep 2018 12:16) (100/81 - 134/88)  BP(mean): --  RR: 16 (06 Sep 2018 12:16) (16 - 20)  SpO2: 97% (06 Sep 2018 12:16) (97% - 100%)    Physical Exam:  General: WN/WD NAD  Neurology: A&Ox2, nonfocal, MCKNIGHT x 4  Respiratory: CTA B/L  CV: RRR, S1S2, no murmurs, rubs or gallops  Abdominal: Soft, NT, ND +BS  Extremities: No edema, + peripheral pulses      LABS:                        10.1   6.08  )-----------( 218      ( 06 Sep 2018 06:41 )             29.6                 RADIOLOGY & ADDITIONAL TESTS:

## 2018-09-06 NOTE — PROGRESS NOTE ADULT - ASSESSMENT
67yo M pmh HTN, DM-2, PD, Sz dirorder, mod MR BPH, GERD, admitted with esophagitis and uncontrolled hyperglycemia  Patient with moderate MR admitted with uncontrolled diabetes and hematemesis.   Pt was treated in ICU with insulin gtt. No evidence of DKA. No AG. PT sugars now better controlled.   CTAP c/w stercoral colitis/esophagitis    #Acute blood loss anemia   LLL infiltrate suspected asp pna  UGIB  Uncontrolled DM-2  Leukocytosis  Elevated lactate  Esophagitis  Constipation  Stercoral colitis  HTN      Recc:  Give one dose vanco and start zosyn for suspected GN rods and other poss resistant organisms  - complete course of antibiotics.   - s/p 2 u RBC with stable Hb ~10 --> patient had EGD- showing esophagitis- no active bleeding, can start PO protonix BID, can restart ASA   BGM now better controlled

## 2018-09-06 NOTE — DISCHARGE NOTE ADULT - CARE PLAN
Principal Discharge DX:	GIB (gastrointestinal bleeding)  Secondary Diagnosis:	DKA (diabetic ketoacidoses) Principal Discharge DX:	GIB (gastrointestinal bleeding)  Goal:	no more GI bleeding  Assessment and plan of treatment:	-Continue protonix 40mg twice a day  -Follow up with GI within 2 weeks of discharge  -Follow up with PCP within 2 weeks of discharge  -Resume all medications, diet and activities. Patient may return to program.  Secondary Diagnosis:	Aspiration pneumonia  Assessment and plan of treatment:	-Complete course of antibiotics  -Follow up with PCP within 2 weeks of discharge  -Resume all medications, diet and activities. Patient may return to program.

## 2018-09-07 LAB
HCT VFR BLD CALC: 29 % — LOW (ref 39–50)
HGB BLD-MCNC: 9.8 G/DL — LOW (ref 13–17)
MCHC RBC-ENTMCNC: 31.5 PG — SIGNIFICANT CHANGE UP (ref 27–34)
MCHC RBC-ENTMCNC: 33.8 GM/DL — SIGNIFICANT CHANGE UP (ref 32–36)
MCV RBC AUTO: 93.2 FL — SIGNIFICANT CHANGE UP (ref 80–100)
NRBC # BLD: 0 /100 WBCS — SIGNIFICANT CHANGE UP (ref 0–0)
PLATELET # BLD AUTO: 216 K/UL — SIGNIFICANT CHANGE UP (ref 150–400)
RBC # BLD: 3.11 M/UL — LOW (ref 4.2–5.8)
RBC # FLD: 13.8 % — SIGNIFICANT CHANGE UP (ref 10.3–14.5)
SURGICAL PATHOLOGY FINAL REPORT - CH: SIGNIFICANT CHANGE UP
WBC # BLD: 5.79 K/UL — SIGNIFICANT CHANGE UP (ref 3.8–10.5)
WBC # FLD AUTO: 5.79 K/UL — SIGNIFICANT CHANGE UP (ref 3.8–10.5)

## 2018-09-07 RX ORDER — LACTOBACILLUS ACIDOPHILUS 100MM CELL
1 CAPSULE ORAL
Qty: 21 | Refills: 0 | OUTPATIENT
Start: 2018-09-07 | End: 2018-09-13

## 2018-09-07 RX ORDER — ASPIRIN/CALCIUM CARB/MAGNESIUM 324 MG
81 TABLET ORAL DAILY
Qty: 0 | Refills: 0 | Status: DISCONTINUED | OUTPATIENT
Start: 2018-09-07 | End: 2018-09-10

## 2018-09-07 RX ADMIN — Medication 100 MILLIGRAM(S): at 17:53

## 2018-09-07 RX ADMIN — POLYETHYLENE GLYCOL 3350 17 GRAM(S): 17 POWDER, FOR SOLUTION ORAL at 17:53

## 2018-09-07 RX ADMIN — Medication 1 TABLET(S): at 12:43

## 2018-09-07 RX ADMIN — ESCITALOPRAM OXALATE 20 MILLIGRAM(S): 10 TABLET, FILM COATED ORAL at 12:43

## 2018-09-07 RX ADMIN — CARBIDOPA AND LEVODOPA 1 TABLET(S): 25; 100 TABLET ORAL at 05:29

## 2018-09-07 RX ADMIN — TAMSULOSIN HYDROCHLORIDE 0.4 MILLIGRAM(S): 0.4 CAPSULE ORAL at 21:20

## 2018-09-07 RX ADMIN — Medication 2: at 08:45

## 2018-09-07 RX ADMIN — Medication 100 MILLIGRAM(S): at 05:25

## 2018-09-07 RX ADMIN — Medication 81 MILLIGRAM(S): at 17:53

## 2018-09-07 RX ADMIN — PIPERACILLIN AND TAZOBACTAM 25 GRAM(S): 4; .5 INJECTION, POWDER, LYOPHILIZED, FOR SOLUTION INTRAVENOUS at 05:29

## 2018-09-07 RX ADMIN — ROPINIROLE 0.75 MILLIGRAM(S): 8 TABLET, FILM COATED, EXTENDED RELEASE ORAL at 05:26

## 2018-09-07 RX ADMIN — DIVALPROEX SODIUM 1000 MILLIGRAM(S): 500 TABLET, DELAYED RELEASE ORAL at 12:43

## 2018-09-07 RX ADMIN — CARBIDOPA AND LEVODOPA 1 TABLET(S): 25; 100 TABLET ORAL at 17:53

## 2018-09-07 RX ADMIN — LEVETIRACETAM 250 MILLIGRAM(S): 250 TABLET, FILM COATED ORAL at 05:26

## 2018-09-07 RX ADMIN — PANTOPRAZOLE SODIUM 40 MILLIGRAM(S): 20 TABLET, DELAYED RELEASE ORAL at 05:26

## 2018-09-07 RX ADMIN — CARBIDOPA AND LEVODOPA 1 TABLET(S): 25; 100 TABLET ORAL at 12:43

## 2018-09-07 RX ADMIN — LEVETIRACETAM 250 MILLIGRAM(S): 250 TABLET, FILM COATED ORAL at 17:53

## 2018-09-07 RX ADMIN — ROPINIROLE 0.75 MILLIGRAM(S): 8 TABLET, FILM COATED, EXTENDED RELEASE ORAL at 13:23

## 2018-09-07 RX ADMIN — Medication 1 TABLET(S): at 17:53

## 2018-09-07 RX ADMIN — ARIPIPRAZOLE 30 MILLIGRAM(S): 15 TABLET ORAL at 12:43

## 2018-09-07 RX ADMIN — PANTOPRAZOLE SODIUM 40 MILLIGRAM(S): 20 TABLET, DELAYED RELEASE ORAL at 17:56

## 2018-09-07 RX ADMIN — DIVALPROEX SODIUM 1000 MILLIGRAM(S): 500 TABLET, DELAYED RELEASE ORAL at 21:20

## 2018-09-07 RX ADMIN — Medication 4: at 17:55

## 2018-09-07 RX ADMIN — CARBIDOPA AND LEVODOPA 1 TABLET(S): 25; 100 TABLET ORAL at 13:23

## 2018-09-07 RX ADMIN — Medication 6: at 12:49

## 2018-09-07 RX ADMIN — Medication 10 MILLIGRAM(S): at 21:20

## 2018-09-07 RX ADMIN — ROPINIROLE 0.75 MILLIGRAM(S): 8 TABLET, FILM COATED, EXTENDED RELEASE ORAL at 21:20

## 2018-09-07 RX ADMIN — INSULIN GLARGINE 7 UNIT(S): 100 INJECTION, SOLUTION SUBCUTANEOUS at 21:20

## 2018-09-07 RX ADMIN — QUETIAPINE FUMARATE 50 MILLIGRAM(S): 200 TABLET, FILM COATED ORAL at 12:43

## 2018-09-07 RX ADMIN — POLYETHYLENE GLYCOL 3350 17 GRAM(S): 17 POWDER, FOR SOLUTION ORAL at 05:26

## 2018-09-07 NOTE — PROGRESS NOTE ADULT - NSHPATTENDINGPLANDISCUSS_GEN_ALL_CORE
group home aide, nursing and patient
group home aide, nursing and patient
group home aide, nursing and patient, HCP
patient, RN
patient, RN
patient, SW

## 2018-09-07 NOTE — PROGRESS NOTE ADULT - ASSESSMENT
#Acute blood loss anemia   LLL infiltrate suspected asp pna  UGIB  Uncontrolled DM-2  Leukocytosis  Elevated lactate  Esophagitis  Constipation  Stercoral colitis  HTN      Recc:  Give one dose vanco and start zosyn for suspected GN rods and other poss resistant organisms  - continue augmentin BID  - elevate RUE, monitor for worsening erythema- cold compresses  - s/p 2 u RBC with stable Hb ~10 --> patient had EGD- showing esophagitis- no active bleeding, can start PO protonix BID, can restart ASA   BGM now better controlled    Dispo- patient was ready for discharge on 9/6/18, group home very hard to get in contact with yesterday and today- now stating they do not take patients back on the weekend (including Fridays)- patient to be discharged likely on 9/10/18

## 2018-09-07 NOTE — PROGRESS NOTE ADULT - SUBJECTIVE AND OBJECTIVE BOX
INTERVAL HPI/OVERNIGHT EVENTS:  Patient with moderate MR admitted with uncontrolled diabetes and hematemesis.   Pt was treated in ICU with insulin gtt. No evidence of DKA. No AG. PT sugars now better controlled.   CTAP c/w stercoral colitis/esophagitis --> awaiting EGD this week.     9/1: pt complaining of cough/congestion. Hgb dropped from 837 to 7.7. No melena or hematochezia or hematemesis o/n.   9/2: Hgb 7. No melena/hematochezia. Made multiple attempts to reach ADRIA Silva at [phone] and brother [phone] with no success. Called head supervisor Lupis Luke who is not at liberty to give consent for emergency blood products- Give the urgency of situation, 2 physician administrative consent has been obtained for PRBC transfusion.   Group home administrators will be attempting to notify family members  9/3: Hgb stable. No BM yet. No abd pain or vomiting. abd distended Good bowel sounds. Spoke with Shira HCP and willing to consent to EGD    9/4: Chart reviewed, seen today w/ bedside nursing, eating lunch, no complaints. Denies abd pain. Had large BM this Am per aide.  9/5: Patient seen and examined at bedside. Aide form home at bedside, states patient more alert and awake today.  9/6: Patient had EGD today showing severe esophagitis, no active bleeding. Patient states he feels well.  9/7: Patient seen and examined at bedside. Feels well, no complaints. Patient noted to have increased redness on R wrist, possible IV infiltration?, recommended to elevate arm warm/cold compresses      MEDICATIONS  (STANDING):  amoxicillin  875 milliGRAM(s)/clavulanate 1 Tablet(s) Oral two times a day  ARIPiprazole 30 milliGRAM(s) Oral daily  carbidopa/levodopa  25/100 1 Tablet(s) Oral four times a day  dextrose 5%. 1000 milliLiter(s) (50 mL/Hr) IV Continuous <Continuous>  dextrose 50% Injectable 50 milliLiter(s) IV Push every 15 minutes  dextrose 50% Injectable 25 milliLiter(s) IV Push every 15 minutes  diVALproex ER 1000 milliGRAM(s) Oral daily  diVALproex ER 1000 milliGRAM(s) Oral at bedtime  docusate sodium 100 milliGRAM(s) Oral two times a day  escitalopram 20 milliGRAM(s) Oral daily  insulin glargine Injectable (LANTUS) 7 Unit(s) SubCutaneous at bedtime  insulin lispro (HumaLOG) corrective regimen sliding scale   SubCutaneous three times a day before meals  insulin lispro (HumaLOG) corrective regimen sliding scale   SubCutaneous at bedtime  levETIRAcetam 250 milliGRAM(s) Oral two times a day  mineral oil enema 133 milliLiter(s) Rectal two times a day  oxybutynin 10 milliGRAM(s) Oral at bedtime  pantoprazole    Tablet 40 milliGRAM(s) Oral two times a day  polyethylene glycol 3350 17 Gram(s) Oral two times a day  QUEtiapine 50 milliGRAM(s) Oral daily  rOPINIRole 0.75 milliGRAM(s) Oral three times a day  tamsulosin 0.4 milliGRAM(s) Oral at bedtime    MEDICATIONS  (PRN):  bisacodyl 5 milliGRAM(s) Oral every 12 hours PRN Constipation  dextrose 40% Gel 15 Gram(s) Oral once PRN Blood Glucose LESS THAN 70 milliGRAM(s)/deciliter  glucagon  Injectable 1 milliGRAM(s) IntraMuscular once PRN Glucose LESS THAN 70 milligrams/deciliter      Allergies    No Known Allergies    Intolerances      ROS limited 2/2 MR but denies SOB, CP, palpitations, abd pain.      Vital Signs Last 24 Hrs  T(C): 36.7 (07 Sep 2018 12:49), Max: 36.8 (07 Sep 2018 04:45)  T(F): 98.1 (07 Sep 2018 12:49), Max: 98.3 (07 Sep 2018 04:45)  HR: 79 (07 Sep 2018 12:49) (76 - 81)  BP: 95/64 (07 Sep 2018 12:49) (92/56 - 110/84)  BP(mean): --  RR: 18 (07 Sep 2018 12:49) (16 - 18)  SpO2: 98% (07 Sep 2018 12:49) (98% - 99%)    09-07 @ 07:01  -  09-07 @ 16:58  --------------------------------------------------------  IN: 360 mL / OUT: 0 mL / NET: 360 mL        Physical Exam:  General: WN/WD NAD  Neurology: A&Ox2, nonfocal, MCKNIGHT x 4  Respiratory: CTA B/L  CV: RRR, S1S2, no murmurs, rubs or gallops  Abdominal: Soft, NT, ND +BS  Extremities: No edema, + peripheral pulses      LABS:                        9.8    5.79  )-----------( 216      ( 07 Sep 2018 07:42 )             29.0                 RADIOLOGY & ADDITIONAL TESTS:

## 2018-09-08 LAB
ANION GAP SERPL CALC-SCNC: 6 MMOL/L — SIGNIFICANT CHANGE UP (ref 5–17)
BUN SERPL-MCNC: 12 MG/DL — SIGNIFICANT CHANGE UP (ref 7–23)
CALCIUM SERPL-MCNC: 8.6 MG/DL — SIGNIFICANT CHANGE UP (ref 8.5–10.1)
CHLORIDE SERPL-SCNC: 101 MMOL/L — SIGNIFICANT CHANGE UP (ref 96–108)
CO2 SERPL-SCNC: 30 MMOL/L — SIGNIFICANT CHANGE UP (ref 22–31)
CREAT SERPL-MCNC: 0.56 MG/DL — SIGNIFICANT CHANGE UP (ref 0.5–1.3)
GLUCOSE SERPL-MCNC: 166 MG/DL — HIGH (ref 70–99)
HCT VFR BLD CALC: 30 % — LOW (ref 39–50)
HGB BLD-MCNC: 10.1 G/DL — LOW (ref 13–17)
MCHC RBC-ENTMCNC: 31.3 PG — SIGNIFICANT CHANGE UP (ref 27–34)
MCHC RBC-ENTMCNC: 33.7 GM/DL — SIGNIFICANT CHANGE UP (ref 32–36)
MCV RBC AUTO: 92.9 FL — SIGNIFICANT CHANGE UP (ref 80–100)
NRBC # BLD: 0 /100 WBCS — SIGNIFICANT CHANGE UP (ref 0–0)
PLATELET # BLD AUTO: 236 K/UL — SIGNIFICANT CHANGE UP (ref 150–400)
POTASSIUM SERPL-MCNC: 4 MMOL/L — SIGNIFICANT CHANGE UP (ref 3.5–5.3)
POTASSIUM SERPL-SCNC: 4 MMOL/L — SIGNIFICANT CHANGE UP (ref 3.5–5.3)
RBC # BLD: 3.23 M/UL — LOW (ref 4.2–5.8)
RBC # FLD: 13.4 % — SIGNIFICANT CHANGE UP (ref 10.3–14.5)
SODIUM SERPL-SCNC: 137 MMOL/L — SIGNIFICANT CHANGE UP (ref 135–145)
WBC # BLD: 5.22 K/UL — SIGNIFICANT CHANGE UP (ref 3.8–10.5)
WBC # FLD AUTO: 5.22 K/UL — SIGNIFICANT CHANGE UP (ref 3.8–10.5)

## 2018-09-08 RX ADMIN — PANTOPRAZOLE SODIUM 40 MILLIGRAM(S): 20 TABLET, DELAYED RELEASE ORAL at 18:31

## 2018-09-08 RX ADMIN — LEVETIRACETAM 250 MILLIGRAM(S): 250 TABLET, FILM COATED ORAL at 21:17

## 2018-09-08 RX ADMIN — POLYETHYLENE GLYCOL 3350 17 GRAM(S): 17 POWDER, FOR SOLUTION ORAL at 18:29

## 2018-09-08 RX ADMIN — Medication 81 MILLIGRAM(S): at 12:12

## 2018-09-08 RX ADMIN — Medication 1 TABLET(S): at 18:29

## 2018-09-08 RX ADMIN — Medication 100 MILLIGRAM(S): at 18:31

## 2018-09-08 RX ADMIN — CARBIDOPA AND LEVODOPA 1 TABLET(S): 25; 100 TABLET ORAL at 15:04

## 2018-09-08 RX ADMIN — LEVETIRACETAM 250 MILLIGRAM(S): 250 TABLET, FILM COATED ORAL at 06:26

## 2018-09-08 RX ADMIN — PANTOPRAZOLE SODIUM 40 MILLIGRAM(S): 20 TABLET, DELAYED RELEASE ORAL at 06:26

## 2018-09-08 RX ADMIN — QUETIAPINE FUMARATE 50 MILLIGRAM(S): 200 TABLET, FILM COATED ORAL at 12:11

## 2018-09-08 RX ADMIN — Medication 10 MILLIGRAM(S): at 21:20

## 2018-09-08 RX ADMIN — Medication 2: at 08:46

## 2018-09-08 RX ADMIN — POLYETHYLENE GLYCOL 3350 17 GRAM(S): 17 POWDER, FOR SOLUTION ORAL at 06:26

## 2018-09-08 RX ADMIN — ARIPIPRAZOLE 30 MILLIGRAM(S): 15 TABLET ORAL at 12:10

## 2018-09-08 RX ADMIN — Medication 4: at 13:52

## 2018-09-08 RX ADMIN — ROPINIROLE 0.75 MILLIGRAM(S): 8 TABLET, FILM COATED, EXTENDED RELEASE ORAL at 15:04

## 2018-09-08 RX ADMIN — Medication 1 TABLET(S): at 08:45

## 2018-09-08 RX ADMIN — TAMSULOSIN HYDROCHLORIDE 0.4 MILLIGRAM(S): 0.4 CAPSULE ORAL at 21:16

## 2018-09-08 RX ADMIN — CARBIDOPA AND LEVODOPA 1 TABLET(S): 25; 100 TABLET ORAL at 06:57

## 2018-09-08 RX ADMIN — Medication 2: at 21:19

## 2018-09-08 RX ADMIN — ESCITALOPRAM OXALATE 20 MILLIGRAM(S): 10 TABLET, FILM COATED ORAL at 12:10

## 2018-09-08 RX ADMIN — DIVALPROEX SODIUM 1000 MILLIGRAM(S): 500 TABLET, DELAYED RELEASE ORAL at 21:17

## 2018-09-08 RX ADMIN — Medication 4: at 18:27

## 2018-09-08 RX ADMIN — ROPINIROLE 0.75 MILLIGRAM(S): 8 TABLET, FILM COATED, EXTENDED RELEASE ORAL at 06:26

## 2018-09-08 RX ADMIN — DIVALPROEX SODIUM 1000 MILLIGRAM(S): 500 TABLET, DELAYED RELEASE ORAL at 12:10

## 2018-09-08 RX ADMIN — Medication 100 MILLIGRAM(S): at 06:39

## 2018-09-08 RX ADMIN — CARBIDOPA AND LEVODOPA 1 TABLET(S): 25; 100 TABLET ORAL at 18:31

## 2018-09-08 RX ADMIN — INSULIN GLARGINE 7 UNIT(S): 100 INJECTION, SOLUTION SUBCUTANEOUS at 21:19

## 2018-09-08 RX ADMIN — ROPINIROLE 0.75 MILLIGRAM(S): 8 TABLET, FILM COATED, EXTENDED RELEASE ORAL at 21:29

## 2018-09-09 RX ORDER — INSULIN GLARGINE 100 [IU]/ML
10 INJECTION, SOLUTION SUBCUTANEOUS AT BEDTIME
Qty: 0 | Refills: 0 | Status: DISCONTINUED | OUTPATIENT
Start: 2018-09-09 | End: 2018-09-10

## 2018-09-09 RX ORDER — METFORMIN HYDROCHLORIDE 850 MG/1
1000 TABLET ORAL
Qty: 0 | Refills: 0 | Status: DISCONTINUED | OUTPATIENT
Start: 2018-09-09 | End: 2018-09-10

## 2018-09-09 RX ORDER — SODIUM CHLORIDE 9 MG/ML
1000 INJECTION INTRAMUSCULAR; INTRAVENOUS; SUBCUTANEOUS
Qty: 0 | Refills: 0 | Status: DISCONTINUED | OUTPATIENT
Start: 2018-09-09 | End: 2018-09-10

## 2018-09-09 RX ADMIN — ARIPIPRAZOLE 30 MILLIGRAM(S): 15 TABLET ORAL at 11:16

## 2018-09-09 RX ADMIN — INSULIN GLARGINE 10 UNIT(S): 100 INJECTION, SOLUTION SUBCUTANEOUS at 21:48

## 2018-09-09 RX ADMIN — ROPINIROLE 0.75 MILLIGRAM(S): 8 TABLET, FILM COATED, EXTENDED RELEASE ORAL at 05:52

## 2018-09-09 RX ADMIN — Medication 1 TABLET(S): at 05:52

## 2018-09-09 RX ADMIN — ROPINIROLE 0.75 MILLIGRAM(S): 8 TABLET, FILM COATED, EXTENDED RELEASE ORAL at 13:13

## 2018-09-09 RX ADMIN — CARBIDOPA AND LEVODOPA 1 TABLET(S): 25; 100 TABLET ORAL at 05:52

## 2018-09-09 RX ADMIN — PANTOPRAZOLE SODIUM 40 MILLIGRAM(S): 20 TABLET, DELAYED RELEASE ORAL at 17:39

## 2018-09-09 RX ADMIN — POLYETHYLENE GLYCOL 3350 17 GRAM(S): 17 POWDER, FOR SOLUTION ORAL at 17:44

## 2018-09-09 RX ADMIN — Medication 100 MILLIGRAM(S): at 05:51

## 2018-09-09 RX ADMIN — CARBIDOPA AND LEVODOPA 1 TABLET(S): 25; 100 TABLET ORAL at 17:41

## 2018-09-09 RX ADMIN — Medication 2: at 18:03

## 2018-09-09 RX ADMIN — Medication 2: at 08:42

## 2018-09-09 RX ADMIN — DIVALPROEX SODIUM 1000 MILLIGRAM(S): 500 TABLET, DELAYED RELEASE ORAL at 21:42

## 2018-09-09 RX ADMIN — CARBIDOPA AND LEVODOPA 1 TABLET(S): 25; 100 TABLET ORAL at 10:59

## 2018-09-09 RX ADMIN — POLYETHYLENE GLYCOL 3350 17 GRAM(S): 17 POWDER, FOR SOLUTION ORAL at 05:52

## 2018-09-09 RX ADMIN — TAMSULOSIN HYDROCHLORIDE 0.4 MILLIGRAM(S): 0.4 CAPSULE ORAL at 21:42

## 2018-09-09 RX ADMIN — QUETIAPINE FUMARATE 50 MILLIGRAM(S): 200 TABLET, FILM COATED ORAL at 11:16

## 2018-09-09 RX ADMIN — Medication 10 MILLIGRAM(S): at 21:42

## 2018-09-09 RX ADMIN — ROPINIROLE 0.75 MILLIGRAM(S): 8 TABLET, FILM COATED, EXTENDED RELEASE ORAL at 21:42

## 2018-09-09 RX ADMIN — LEVETIRACETAM 250 MILLIGRAM(S): 250 TABLET, FILM COATED ORAL at 05:51

## 2018-09-09 RX ADMIN — Medication 100 MILLIGRAM(S): at 17:41

## 2018-09-09 RX ADMIN — DIVALPROEX SODIUM 1000 MILLIGRAM(S): 500 TABLET, DELAYED RELEASE ORAL at 11:17

## 2018-09-09 RX ADMIN — PANTOPRAZOLE SODIUM 40 MILLIGRAM(S): 20 TABLET, DELAYED RELEASE ORAL at 05:52

## 2018-09-09 RX ADMIN — Medication 2: at 13:14

## 2018-09-09 RX ADMIN — ESCITALOPRAM OXALATE 20 MILLIGRAM(S): 10 TABLET, FILM COATED ORAL at 11:15

## 2018-09-09 RX ADMIN — LEVETIRACETAM 250 MILLIGRAM(S): 250 TABLET, FILM COATED ORAL at 17:44

## 2018-09-09 RX ADMIN — Medication 81 MILLIGRAM(S): at 11:19

## 2018-09-09 RX ADMIN — CARBIDOPA AND LEVODOPA 1 TABLET(S): 25; 100 TABLET ORAL at 13:15

## 2018-09-09 RX ADMIN — METFORMIN HYDROCHLORIDE 1000 MILLIGRAM(S): 850 TABLET ORAL at 17:39

## 2018-09-09 RX ADMIN — SODIUM CHLORIDE 100 MILLILITER(S): 9 INJECTION INTRAMUSCULAR; INTRAVENOUS; SUBCUTANEOUS at 13:12

## 2018-09-09 NOTE — PROGRESS NOTE ADULT - SUBJECTIVE AND OBJECTIVE BOX
HOSPITAL COURSE AND ASSESSMENT    Patient with moderate MR admitted with uncontrolled diabetes and hematemesis. Pt was treated in ICU with insulin gtt for tight glycemic control.   CTAP c/w stercoral colitis/esophagitis. Course complicated by anemia, for which no NOK was able to be notified. Group home also unable to consent, so 2 physician consent completed for blood transfusion. EGD 9/6 showed severe esophagitis, no active bleeding.    Medically stable for discharge, but group home will not take patients Friday, Saturday, Sunday. Anticipate discharge Monday      #Acute blood loss anemia due to UGIB/severe esophagitis  -Hb stable s/p 2 unit PRBCS  -EGD 9/6  -ppi BID  -resumed ASA    *Probable aspiration pneumonia with leukocytosis  LLL infiltrate   vanco and start zosyn for suspected GN rods and other poss resistant organisms, deescalated to augmentin BID, now course complete    DM-2 with vascular complication  -A1C 7.9  -possible infection triggered hyperglycemia  -titrating insulin to medical goals    Constipation/Stercoral colitis-resolved      NOK Shira at [phone] and brother [phone]      ----------------------------------------------------------------------------------------------  CHIEF COMPLAINT:  hyperglycemia    SUBJECTIVE:     tolerating PO. confused.   no acute events    REVIEW OF SYSTEMS:  ltd due to mental status    Vital Signs Last 24 Hrs  T(C): 36.2 (09 Sep 2018 05:28), Max: 36.9 (08 Sep 2018 17:13)  T(F): 97.2 (09 Sep 2018 05:28), Max: 98.4 (08 Sep 2018 17:13)  HR: 73 (09 Sep 2018 05:28) (73 - 91)  BP: 103/71 (09 Sep 2018 05:28) (94/71 - 110/81)  BP(mean): --  RR: 17 (09 Sep 2018 05:28) (14 - 17)  SpO2: 99% (09 Sep 2018 05:28) (96% - 99%)  CAPILLARY BLOOD GLUCOSE      POCT Blood Glucose.: 168 mg/dL (09 Sep 2018 08:37)  POCT Blood Glucose.: 252 mg/dL (08 Sep 2018 20:55)  POCT Blood Glucose.: 217 mg/dL (08 Sep 2018 18:26)  POCT Blood Glucose.: 219 mg/dL (08 Sep 2018 13:26)      PHYSICAL EXAM:  Constitutional: NAD, NCAT  HEENT: EOMI, Normal Hearing  Neck: trachea midline, No JVD  Respiratory: Breath sounds are clear, unlabored respiration  Cardiovascular: S1 and S2, regular rate   Gastrointestinal: Bowel Sounds present, soft, nontender, nondistended  Extremities: No peripheral edema  Vascular: 2+ radial pulse  Neurological: awake, alert, follows commands, sensation grossly intact  Psych: appropriate affect, poor insight  Musculoskeletal: moves all extremities  Skin: No rashes    ALLERGIES  No Known Allergies      MEDICATIONS  (STANDING):  amoxicillin  875 milliGRAM(s)/clavulanate 1 Tablet(s) Oral two times a day  ARIPiprazole 30 milliGRAM(s) Oral daily  aspirin enteric coated 81 milliGRAM(s) Oral daily  carbidopa/levodopa  25/100 1 Tablet(s) Oral four times a day  dextrose 5%. 1000 milliLiter(s) (50 mL/Hr) IV Continuous <Continuous>  dextrose 50% Injectable 50 milliLiter(s) IV Push every 15 minutes  dextrose 50% Injectable 25 milliLiter(s) IV Push every 15 minutes  diVALproex ER 1000 milliGRAM(s) Oral daily  diVALproex ER 1000 milliGRAM(s) Oral at bedtime  docusate sodium 100 milliGRAM(s) Oral two times a day  escitalopram 20 milliGRAM(s) Oral daily  insulin glargine Injectable (LANTUS) 7 Unit(s) SubCutaneous at bedtime  insulin lispro (HumaLOG) corrective regimen sliding scale   SubCutaneous three times a day before meals  insulin lispro (HumaLOG) corrective regimen sliding scale   SubCutaneous at bedtime  levETIRAcetam 250 milliGRAM(s) Oral two times a day  oxybutynin 10 milliGRAM(s) Oral at bedtime  pantoprazole    Tablet 40 milliGRAM(s) Oral two times a day  polyethylene glycol 3350 17 Gram(s) Oral two times a day  QUEtiapine 50 milliGRAM(s) Oral daily  rOPINIRole 0.75 milliGRAM(s) Oral three times a day  tamsulosin 0.4 milliGRAM(s) Oral at bedtime      LABS: All Labs Reviewed:                        10.1   5.22  )-----------( 236      ( 08 Sep 2018 05:04 )             30.0     09-08    137  |  101  |  12  ----------------------------<  166<H>  4.0   |  30  |  0.56    Ca    8.6      08 Sep 2018 05:04            Blood Culture:

## 2018-09-10 VITALS — SYSTOLIC BLOOD PRESSURE: 92 MMHG | DIASTOLIC BLOOD PRESSURE: 60 MMHG | TEMPERATURE: 98 F | HEART RATE: 83 BPM

## 2018-09-10 RX ORDER — ASPIRIN/CALCIUM CARB/MAGNESIUM 324 MG
1 TABLET ORAL
Qty: 0 | Refills: 0 | COMMUNITY

## 2018-09-10 RX ORDER — ASPIRIN/CALCIUM CARB/MAGNESIUM 324 MG
1 TABLET ORAL
Qty: 0 | Refills: 0 | COMMUNITY
Start: 2018-09-10

## 2018-09-10 RX ADMIN — CARBIDOPA AND LEVODOPA 1 TABLET(S): 25; 100 TABLET ORAL at 05:30

## 2018-09-10 RX ADMIN — METFORMIN HYDROCHLORIDE 1000 MILLIGRAM(S): 850 TABLET ORAL at 05:34

## 2018-09-10 RX ADMIN — ROPINIROLE 0.75 MILLIGRAM(S): 8 TABLET, FILM COATED, EXTENDED RELEASE ORAL at 05:29

## 2018-09-10 RX ADMIN — LEVETIRACETAM 250 MILLIGRAM(S): 250 TABLET, FILM COATED ORAL at 05:31

## 2018-09-10 RX ADMIN — Medication 100 MILLIGRAM(S): at 05:30

## 2018-09-10 RX ADMIN — PANTOPRAZOLE SODIUM 40 MILLIGRAM(S): 20 TABLET, DELAYED RELEASE ORAL at 05:31

## 2018-09-10 RX ADMIN — CARBIDOPA AND LEVODOPA 1 TABLET(S): 25; 100 TABLET ORAL at 11:12

## 2018-09-10 NOTE — PROGRESS NOTE ADULT - PROVIDER SPECIALTY LIST ADULT
Critical Care
Gastroenterology
Hospitalist
Gastroenterology
Critical Care
Hospitalist

## 2018-09-10 NOTE — PROGRESS NOTE ADULT - REASON FOR ADMISSION
vomiting coffee grounds, poor po intact

## 2018-09-10 NOTE — PROGRESS NOTE ADULT - SUBJECTIVE AND OBJECTIVE BOX
pt seen and examined. Medically stable for discharge. to resume medications and compression hose. Medically stable for discharge, but group home will not take patients Friday, Saturday, Sunday. See summary for further details. to resume compression stockings on discharge.     Patient with moderate MR admitted with uncontrolled diabetes and hematemesis. Pt was treated in ICU with insulin gtt for tight glycemic control.   CTAP c/w stercoral colitis/esophagitis. Course complicated by anemia, for which no NOK was able to be notified. Group home also unable to consent, so 2 physician consent completed for blood transfusion. EGD 9/6 showed severe esophagitis, no active bleeding.        #Acute blood loss anemia due to UGIB/severe esophagitis  -Hb stable s/p 2 unit PRBCS  -EGD 9/6  -ppi BID  -resumed ASA    *Probable aspiration pneumonia with leukocytosis  LLL infiltrate   vanco and start zosyn for suspected GN rods and other poss resistant organisms, deescalated to augmentin BID, now course complete    DM-2 with vascular complication  -A1C 7.9  -possible infection triggered hyperglycemia  -titrating insulin to medical goals    Constipation/Stercoral colitis-resolved      NOK Shira at [phone] and brother [phone]      total time, including coordination of care: 32 minutes  GEN: NAD  EYES: eomi  CV: no edema  RESP: unlabored

## 2018-09-12 ENCOUNTER — RX RENEWAL (OUTPATIENT)
Age: 68
End: 2018-09-12

## 2018-09-13 DIAGNOSIS — E11.65 TYPE 2 DIABETES MELLITUS WITH HYPERGLYCEMIA: ICD-10-CM

## 2018-09-13 DIAGNOSIS — E86.0 DEHYDRATION: ICD-10-CM

## 2018-09-13 DIAGNOSIS — F71 MODERATE INTELLECTUAL DISABILITIES: ICD-10-CM

## 2018-09-13 DIAGNOSIS — K92.2 GASTROINTESTINAL HEMORRHAGE, UNSPECIFIED: ICD-10-CM

## 2018-09-13 DIAGNOSIS — K22.11 ULCER OF ESOPHAGUS WITH BLEEDING: ICD-10-CM

## 2018-09-13 DIAGNOSIS — D62 ACUTE POSTHEMORRHAGIC ANEMIA: ICD-10-CM

## 2018-09-13 DIAGNOSIS — E11.69 TYPE 2 DIABETES MELLITUS WITH OTHER SPECIFIED COMPLICATION: ICD-10-CM

## 2018-09-13 DIAGNOSIS — K52.9 NONINFECTIVE GASTROENTERITIS AND COLITIS, UNSPECIFIED: ICD-10-CM

## 2018-09-13 DIAGNOSIS — I10 ESSENTIAL (PRIMARY) HYPERTENSION: ICD-10-CM

## 2018-09-13 DIAGNOSIS — J15.6 PNEUMONIA DUE TO OTHER GRAM-NEGATIVE BACTERIA: ICD-10-CM

## 2018-09-13 DIAGNOSIS — J69.0 PNEUMONITIS DUE TO INHALATION OF FOOD AND VOMIT: ICD-10-CM

## 2018-09-13 DIAGNOSIS — N40.0 BENIGN PROSTATIC HYPERPLASIA WITHOUT LOWER URINARY TRACT SYMPTOMS: ICD-10-CM

## 2018-09-13 DIAGNOSIS — G20 PARKINSON'S DISEASE: ICD-10-CM

## 2018-09-13 DIAGNOSIS — K21.0 GASTRO-ESOPHAGEAL REFLUX DISEASE WITH ESOPHAGITIS: ICD-10-CM

## 2018-09-13 DIAGNOSIS — G40.909 EPILEPSY, UNSPECIFIED, NOT INTRACTABLE, WITHOUT STATUS EPILEPTICUS: ICD-10-CM

## 2018-09-13 DIAGNOSIS — K59.00 CONSTIPATION, UNSPECIFIED: ICD-10-CM

## 2018-10-08 ENCOUNTER — RX RENEWAL (OUTPATIENT)
Age: 68
End: 2018-10-08

## 2018-10-30 ENCOUNTER — MEDICATION RENEWAL (OUTPATIENT)
Age: 68
End: 2018-10-30

## 2018-10-31 ENCOUNTER — APPOINTMENT (OUTPATIENT)
Dept: UROLOGY | Facility: CLINIC | Age: 68
End: 2018-10-31

## 2018-11-02 ENCOUNTER — APPOINTMENT (OUTPATIENT)
Dept: UROLOGY | Facility: CLINIC | Age: 68
End: 2018-11-02
Payer: MEDICARE

## 2018-11-02 VITALS
OXYGEN SATURATION: 95 % | HEIGHT: 65 IN | WEIGHT: 150 LBS | DIASTOLIC BLOOD PRESSURE: 69 MMHG | HEART RATE: 78 BPM | TEMPERATURE: 97.7 F | BODY MASS INDEX: 24.99 KG/M2 | SYSTOLIC BLOOD PRESSURE: 101 MMHG

## 2018-11-02 PROCEDURE — 99213 OFFICE O/P EST LOW 20 MIN: CPT

## 2018-11-26 NOTE — DISCHARGE NOTE ADULT - REASON FOR ADMISSION
lethargy [facility] Partial Hospitalization Program, [address] [facility] Patient will follow up at the Partial Hospitalization Program, [address] [place] [address], phone number- [phone], on Tuesday, 11/27/20018 at 9am.

## 2018-12-13 ENCOUNTER — APPOINTMENT (OUTPATIENT)
Dept: CARDIOLOGY | Facility: CLINIC | Age: 68
End: 2018-12-13
Payer: MEDICARE

## 2018-12-13 PROCEDURE — 93306 TTE W/DOPPLER COMPLETE: CPT

## 2019-01-11 ENCOUNTER — APPOINTMENT (OUTPATIENT)
Dept: CARDIOLOGY | Facility: CLINIC | Age: 69
End: 2019-01-11
Payer: MEDICARE

## 2019-01-11 ENCOUNTER — NON-APPOINTMENT (OUTPATIENT)
Age: 69
End: 2019-01-11

## 2019-01-11 VITALS — OXYGEN SATURATION: 97 % | HEART RATE: 81 BPM | DIASTOLIC BLOOD PRESSURE: 80 MMHG | SYSTOLIC BLOOD PRESSURE: 109 MMHG

## 2019-01-11 PROCEDURE — 93000 ELECTROCARDIOGRAM COMPLETE: CPT

## 2019-01-11 PROCEDURE — 99214 OFFICE O/P EST MOD 30 MIN: CPT | Mod: 25

## 2019-01-11 NOTE — PHYSICAL EXAM
[General Appearance - Well Developed] : well developed [Normal Appearance] : normal appearance [General Appearance - Well Nourished] : well nourished [General Appearance - In No Acute Distress] : no acute distress [Normal Conjunctiva] : the conjunctiva exhibited no abnormalities [Eyelids - No Xanthelasma] : the eyelids demonstrated no xanthelasmas [No Oral Pallor] : no oral pallor [No Oral Cyanosis] : no oral cyanosis [Respiration, Rhythm And Depth] : normal respiratory rhythm and effort [Exaggerated Use Of Accessory Muscles For Inspiration] : no accessory muscle use [Auscultation Breath Sounds / Voice Sounds] : lungs were clear to auscultation bilaterally [Heart Rate And Rhythm] : heart rate and rhythm were normal [Heart Sounds] : normal S1 and S2 [Murmurs] : no murmurs present [Edema] : no peripheral edema present [Bowel Sounds] : normal bowel sounds [Abdomen Soft] : soft [Abdomen Tenderness] : non-tender [] : no hepato-splenomegaly [Abnormal Walk] : normal gait [Nail Clubbing] : no clubbing of the fingernails [Cyanosis, Localized] : no localized cyanosis [Skin Color & Pigmentation] : normal skin color and pigmentation [No Anxiety] : not feeling anxious [FreeTextEntry1] : mental retardation.

## 2019-01-11 NOTE — REASON FOR VISIT
[Follow-Up - Clinic] : a clinic follow-up of [FreeTextEntry2] : hypertension. hx. pvcs, abnormal EKG

## 2019-01-11 NOTE — HISTORY OF PRESENT ILLNESS
[FreeTextEntry1] : Pt. presents today for routine follow-up. \par \par Pt. feels well. wheel chair bound \par \par  He denies  chest pain, SOB, palpitation, presyncope, syncope or edema. ( He is accompanied by his aides who confirms this). He ambulates without ANDERSEN or chest pain. He ambulates with the assistance of a walker short distances but spends much of the day in his wheelchair. \par \par He had been taking Carvedilol which has been stopped related to low BP in the past. \par \par Is followed by his PCP  for tx. of DM and HTN. Has labs done there. \par \par Last echocardiogram 12/2017. ( see summary section above). \par \par \par \par

## 2019-01-11 NOTE — DISCUSSION/SUMMARY
[FreeTextEntry1] : Chest x-ray report July 2, 2015 - no evidence of active chest disease. Hiatus hernia unchanged.\par \par no recent blood work  results provided \par \par Assessment:\par \par Hypertension - BP controlled.  No longer taking Carvedilol. He will continue to have this monitored by his PCP. \par \par Pvcs - on resting EKGs  in the past. No sxs. Beta blocker had been d/george related to low BP and was repeatedly being held anyway.  12/2018 echo with nl. LV systolic function mild dilated aortic root without much change from prior . No medication  treatment required, \par \par Mild aortic root dilatation by echocardiogram -  no change in size in follow up echo  \par \par DM - as per PCP. .\par \par Cardiac status appears stable at this time. \par \par   please fax lipid profile report   , please be aware the medication list above is not well updated as the list provided was from october 2018 list , \par \par  follow up after 6 months

## 2019-02-28 ENCOUNTER — INPATIENT (INPATIENT)
Facility: HOSPITAL | Age: 69
LOS: 3 days | Discharge: HOME | End: 2019-03-04
Attending: INTERNAL MEDICINE | Admitting: INTERNAL MEDICINE
Payer: MEDICARE

## 2019-02-28 VITALS
OXYGEN SATURATION: 95 % | SYSTOLIC BLOOD PRESSURE: 141 MMHG | WEIGHT: 175.05 LBS | DIASTOLIC BLOOD PRESSURE: 94 MMHG | HEIGHT: 71 IN | HEART RATE: 109 BPM | RESPIRATION RATE: 18 BRPM

## 2019-02-28 DIAGNOSIS — Z98.890 OTHER SPECIFIED POSTPROCEDURAL STATES: Chronic | ICD-10-CM

## 2019-02-28 LAB
ACETONE SERPL-MCNC: ABNORMAL
ADD ON TEST-SPECIMEN IN LAB: SIGNIFICANT CHANGE UP
ALBUMIN SERPL ELPH-MCNC: 3.4 G/DL — SIGNIFICANT CHANGE UP (ref 3.3–5)
ALP SERPL-CCNC: 71 U/L — SIGNIFICANT CHANGE UP (ref 40–120)
ALT FLD-CCNC: 12 U/L — SIGNIFICANT CHANGE UP (ref 12–78)
ANION GAP SERPL CALC-SCNC: 12 MMOL/L — SIGNIFICANT CHANGE UP (ref 5–17)
ANION GAP SERPL CALC-SCNC: 9 MMOL/L — SIGNIFICANT CHANGE UP (ref 5–17)
APTT BLD: 37.6 SEC — HIGH (ref 27.5–36.3)
AST SERPL-CCNC: 12 U/L — LOW (ref 15–37)
BASOPHILS # BLD AUTO: 0.02 K/UL — SIGNIFICANT CHANGE UP (ref 0–0.2)
BASOPHILS NFR BLD AUTO: 0.3 % — SIGNIFICANT CHANGE UP (ref 0–2)
BILIRUB SERPL-MCNC: 0.3 MG/DL — SIGNIFICANT CHANGE UP (ref 0.2–1.2)
BLD GP AB SCN SERPL QL: SIGNIFICANT CHANGE UP
BUN SERPL-MCNC: 28 MG/DL — HIGH (ref 7–23)
BUN SERPL-MCNC: 29 MG/DL — HIGH (ref 7–23)
CALCIUM SERPL-MCNC: 8.9 MG/DL — SIGNIFICANT CHANGE UP (ref 8.5–10.1)
CALCIUM SERPL-MCNC: 9.9 MG/DL — SIGNIFICANT CHANGE UP (ref 8.5–10.1)
CHLORIDE SERPL-SCNC: 103 MMOL/L — SIGNIFICANT CHANGE UP (ref 96–108)
CHLORIDE SERPL-SCNC: 96 MMOL/L — SIGNIFICANT CHANGE UP (ref 96–108)
CO2 SERPL-SCNC: 29 MMOL/L — SIGNIFICANT CHANGE UP (ref 22–31)
CO2 SERPL-SCNC: 29 MMOL/L — SIGNIFICANT CHANGE UP (ref 22–31)
CREAT SERPL-MCNC: 0.81 MG/DL — SIGNIFICANT CHANGE UP (ref 0.5–1.3)
CREAT SERPL-MCNC: 1.09 MG/DL — SIGNIFICANT CHANGE UP (ref 0.5–1.3)
EOSINOPHIL # BLD AUTO: 0.01 K/UL — SIGNIFICANT CHANGE UP (ref 0–0.5)
EOSINOPHIL NFR BLD AUTO: 0.1 % — SIGNIFICANT CHANGE UP (ref 0–6)
GLUCOSE BLDC GLUCOMTR-MCNC: 186 MG/DL — HIGH (ref 70–99)
GLUCOSE BLDC GLUCOMTR-MCNC: 204 MG/DL — HIGH (ref 70–99)
GLUCOSE BLDC GLUCOMTR-MCNC: 318 MG/DL — HIGH (ref 70–99)
GLUCOSE BLDC GLUCOMTR-MCNC: 329 MG/DL — HIGH (ref 70–99)
GLUCOSE SERPL-MCNC: 351 MG/DL — HIGH (ref 70–99)
GLUCOSE SERPL-MCNC: 392 MG/DL — HIGH (ref 70–99)
HCT VFR BLD CALC: 37.2 % — LOW (ref 39–50)
HCT VFR BLD CALC: 39 % — SIGNIFICANT CHANGE UP (ref 39–50)
HCV AB S/CO SERPL IA: 0.12 S/CO — SIGNIFICANT CHANGE UP (ref 0–0.79)
HCV AB SERPL-IMP: SIGNIFICANT CHANGE UP
HGB BLD-MCNC: 12.6 G/DL — LOW (ref 13–17)
HGB BLD-MCNC: 13.1 G/DL — SIGNIFICANT CHANGE UP (ref 13–17)
IMM GRANULOCYTES NFR BLD AUTO: 0.4 % — SIGNIFICANT CHANGE UP (ref 0–1.5)
INR BLD: 1.17 RATIO — HIGH (ref 0.88–1.16)
LACTATE SERPL-SCNC: 1.9 MMOL/L — SIGNIFICANT CHANGE UP (ref 0.7–2)
LACTATE SERPL-SCNC: 5 MMOL/L — CRITICAL HIGH (ref 0.7–2)
LIDOCAIN IGE QN: 57 U/L — LOW (ref 73–393)
LYMPHOCYTES # BLD AUTO: 1.16 K/UL — SIGNIFICANT CHANGE UP (ref 1–3.3)
LYMPHOCYTES # BLD AUTO: 15.3 % — SIGNIFICANT CHANGE UP (ref 13–44)
MAGNESIUM SERPL-MCNC: 1.6 MG/DL — SIGNIFICANT CHANGE UP (ref 1.6–2.6)
MCHC RBC-ENTMCNC: 31.9 PG — SIGNIFICANT CHANGE UP (ref 27–34)
MCHC RBC-ENTMCNC: 32.1 PG — SIGNIFICANT CHANGE UP (ref 27–34)
MCHC RBC-ENTMCNC: 33.6 GM/DL — SIGNIFICANT CHANGE UP (ref 32–36)
MCHC RBC-ENTMCNC: 33.9 GM/DL — SIGNIFICANT CHANGE UP (ref 32–36)
MCV RBC AUTO: 94.7 FL — SIGNIFICANT CHANGE UP (ref 80–100)
MCV RBC AUTO: 94.9 FL — SIGNIFICANT CHANGE UP (ref 80–100)
MONOCYTES # BLD AUTO: 0.41 K/UL — SIGNIFICANT CHANGE UP (ref 0–0.9)
MONOCYTES NFR BLD AUTO: 5.4 % — SIGNIFICANT CHANGE UP (ref 2–14)
NEUTROPHILS # BLD AUTO: 5.93 K/UL — SIGNIFICANT CHANGE UP (ref 1.8–7.4)
NEUTROPHILS NFR BLD AUTO: 78.5 % — HIGH (ref 43–77)
NRBC # BLD: 0 /100 WBCS — SIGNIFICANT CHANGE UP (ref 0–0)
NRBC # BLD: 0 /100 WBCS — SIGNIFICANT CHANGE UP (ref 0–0)
PHOSPHATE SERPL-MCNC: 3.4 MG/DL — SIGNIFICANT CHANGE UP (ref 2.5–4.5)
PLATELET # BLD AUTO: 168 K/UL — SIGNIFICANT CHANGE UP (ref 150–400)
PLATELET # BLD AUTO: 222 K/UL — SIGNIFICANT CHANGE UP (ref 150–400)
POTASSIUM SERPL-MCNC: 4.1 MMOL/L — SIGNIFICANT CHANGE UP (ref 3.5–5.3)
POTASSIUM SERPL-MCNC: 4.1 MMOL/L — SIGNIFICANT CHANGE UP (ref 3.5–5.3)
POTASSIUM SERPL-SCNC: 4.1 MMOL/L — SIGNIFICANT CHANGE UP (ref 3.5–5.3)
POTASSIUM SERPL-SCNC: 4.1 MMOL/L — SIGNIFICANT CHANGE UP (ref 3.5–5.3)
PROT SERPL-MCNC: 7 GM/DL — SIGNIFICANT CHANGE UP (ref 6–8.3)
PROTHROM AB SERPL-ACNC: 13 SEC — HIGH (ref 10–12.9)
RBC # BLD: 3.93 M/UL — LOW (ref 4.2–5.8)
RBC # BLD: 4.11 M/UL — LOW (ref 4.2–5.8)
RBC # FLD: 12.2 % — SIGNIFICANT CHANGE UP (ref 10.3–14.5)
RBC # FLD: 12.6 % — SIGNIFICANT CHANGE UP (ref 10.3–14.5)
SODIUM SERPL-SCNC: 137 MMOL/L — SIGNIFICANT CHANGE UP (ref 135–145)
SODIUM SERPL-SCNC: 141 MMOL/L — SIGNIFICANT CHANGE UP (ref 135–145)
TROPONIN I SERPL-MCNC: <0.015 NG/ML — SIGNIFICANT CHANGE UP (ref 0.01–0.04)
TYPE + AB SCN PNL BLD: SIGNIFICANT CHANGE UP
WBC # BLD: 10.22 K/UL — SIGNIFICANT CHANGE UP (ref 3.8–10.5)
WBC # BLD: 7.56 K/UL — SIGNIFICANT CHANGE UP (ref 3.8–10.5)
WBC # FLD AUTO: 10.22 K/UL — SIGNIFICANT CHANGE UP (ref 3.8–10.5)
WBC # FLD AUTO: 7.56 K/UL — SIGNIFICANT CHANGE UP (ref 3.8–10.5)

## 2019-02-28 PROCEDURE — 86079 PHYS BLOOD BANK SERV AUTHRJ: CPT

## 2019-02-28 PROCEDURE — 99285 EMERGENCY DEPT VISIT HI MDM: CPT

## 2019-02-28 PROCEDURE — 71045 X-RAY EXAM CHEST 1 VIEW: CPT | Mod: 26

## 2019-02-28 PROCEDURE — 93010 ELECTROCARDIOGRAM REPORT: CPT

## 2019-02-28 PROCEDURE — 74176 CT ABD & PELVIS W/O CONTRAST: CPT | Mod: 26

## 2019-02-28 PROCEDURE — 76700 US EXAM ABDOM COMPLETE: CPT | Mod: 26

## 2019-02-28 RX ORDER — PANTOPRAZOLE SODIUM 20 MG/1
8 TABLET, DELAYED RELEASE ORAL
Qty: 80 | Refills: 0 | Status: DISCONTINUED | OUTPATIENT
Start: 2019-02-28 | End: 2019-02-28

## 2019-02-28 RX ORDER — TAMSULOSIN HYDROCHLORIDE 0.4 MG/1
0.4 CAPSULE ORAL
Qty: 0 | Refills: 0 | Status: DISCONTINUED | OUTPATIENT
Start: 2019-02-28 | End: 2019-03-04

## 2019-02-28 RX ORDER — SODIUM CHLORIDE 9 MG/ML
1000 INJECTION, SOLUTION INTRAVENOUS
Qty: 0 | Refills: 0 | Status: DISCONTINUED | OUTPATIENT
Start: 2019-02-28 | End: 2019-03-04

## 2019-02-28 RX ORDER — QUETIAPINE FUMARATE 200 MG/1
1 TABLET, FILM COATED ORAL
Qty: 0 | Refills: 0 | COMMUNITY

## 2019-02-28 RX ORDER — ARIPIPRAZOLE 15 MG/1
1 TABLET ORAL
Qty: 0 | Refills: 0 | COMMUNITY

## 2019-02-28 RX ORDER — DEXTROSE MONOHYDRATE, SODIUM CHLORIDE, AND POTASSIUM CHLORIDE 50; .745; 4.5 G/1000ML; G/1000ML; G/1000ML
1000 INJECTION, SOLUTION INTRAVENOUS
Qty: 0 | Refills: 0 | Status: DISCONTINUED | OUTPATIENT
Start: 2019-02-28 | End: 2019-03-04

## 2019-02-28 RX ORDER — KETOCONAZOLE 20 MG/G
1 AEROSOL, FOAM TOPICAL
Qty: 0 | Refills: 0 | COMMUNITY

## 2019-02-28 RX ORDER — PANTOPRAZOLE SODIUM 20 MG/1
40 TABLET, DELAYED RELEASE ORAL
Qty: 0 | Refills: 0 | Status: DISCONTINUED | OUTPATIENT
Start: 2019-02-28 | End: 2019-03-04

## 2019-02-28 RX ORDER — KETOCONAZOLE 20 MG/G
1 AEROSOL, FOAM TOPICAL
Qty: 0 | Refills: 0 | Status: DISCONTINUED | OUTPATIENT
Start: 2019-02-28 | End: 2019-03-04

## 2019-02-28 RX ORDER — CARBIDOPA AND LEVODOPA 25; 100 MG/1; MG/1
2 TABLET ORAL
Qty: 0 | Refills: 0 | Status: DISCONTINUED | OUTPATIENT
Start: 2019-02-28 | End: 2019-02-28

## 2019-02-28 RX ORDER — DEXTROSE 50 % IN WATER 50 %
25 SYRINGE (ML) INTRAVENOUS ONCE
Qty: 0 | Refills: 0 | Status: DISCONTINUED | OUTPATIENT
Start: 2019-02-28 | End: 2019-03-04

## 2019-02-28 RX ORDER — INSULIN LISPRO 100/ML
VIAL (ML) SUBCUTANEOUS EVERY 6 HOURS
Qty: 0 | Refills: 0 | Status: DISCONTINUED | OUTPATIENT
Start: 2019-02-28 | End: 2019-03-03

## 2019-02-28 RX ORDER — MULTIVIT WITH MIN/MFOLATE/K2 340-15/3 G
1 POWDER (GRAM) ORAL DAILY
Qty: 0 | Refills: 0 | Status: DISCONTINUED | OUTPATIENT
Start: 2019-02-28 | End: 2019-03-04

## 2019-02-28 RX ORDER — SODIUM CHLORIDE 9 MG/ML
3 INJECTION INTRAMUSCULAR; INTRAVENOUS; SUBCUTANEOUS ONCE
Qty: 0 | Refills: 0 | Status: COMPLETED | OUTPATIENT
Start: 2019-02-28 | End: 2019-02-28

## 2019-02-28 RX ORDER — SENNA PLUS 8.6 MG/1
2 TABLET ORAL AT BEDTIME
Qty: 0 | Refills: 0 | Status: DISCONTINUED | OUTPATIENT
Start: 2019-02-28 | End: 2019-03-04

## 2019-02-28 RX ORDER — SODIUM CHLORIDE 9 MG/ML
1 INJECTION INTRAMUSCULAR; INTRAVENOUS; SUBCUTANEOUS DAILY
Qty: 0 | Refills: 0 | Status: DISCONTINUED | OUTPATIENT
Start: 2019-02-28 | End: 2019-03-04

## 2019-02-28 RX ORDER — LEVETIRACETAM 250 MG/1
250 TABLET, FILM COATED ORAL
Qty: 0 | Refills: 0 | Status: DISCONTINUED | OUTPATIENT
Start: 2019-02-28 | End: 2019-03-04

## 2019-02-28 RX ORDER — DOCUSATE SODIUM 100 MG
100 CAPSULE ORAL
Qty: 0 | Refills: 0 | Status: DISCONTINUED | OUTPATIENT
Start: 2019-02-28 | End: 2019-03-04

## 2019-02-28 RX ORDER — DIVALPROEX SODIUM 500 MG/1
2 TABLET, DELAYED RELEASE ORAL
Qty: 0 | Refills: 0 | COMMUNITY

## 2019-02-28 RX ORDER — DIVALPROEX SODIUM 500 MG/1
500 TABLET, DELAYED RELEASE ORAL AT BEDTIME
Qty: 0 | Refills: 0 | Status: DISCONTINUED | OUTPATIENT
Start: 2019-02-28 | End: 2019-03-04

## 2019-02-28 RX ORDER — CARBIDOPA AND LEVODOPA 25; 100 MG/1; MG/1
1 TABLET ORAL
Qty: 0 | Refills: 0 | COMMUNITY

## 2019-02-28 RX ORDER — CARBIDOPA AND LEVODOPA 25; 100 MG/1; MG/1
2 TABLET ORAL
Qty: 0 | Refills: 0 | Status: DISCONTINUED | OUTPATIENT
Start: 2019-02-28 | End: 2019-03-04

## 2019-02-28 RX ORDER — PANTOPRAZOLE SODIUM 20 MG/1
40 TABLET, DELAYED RELEASE ORAL ONCE
Qty: 0 | Refills: 0 | Status: COMPLETED | OUTPATIENT
Start: 2019-02-28 | End: 2019-02-28

## 2019-02-28 RX ORDER — POLYETHYLENE GLYCOL 3350 17 G/17G
17 POWDER, FOR SOLUTION ORAL DAILY
Qty: 0 | Refills: 0 | Status: DISCONTINUED | OUTPATIENT
Start: 2019-02-28 | End: 2019-03-04

## 2019-02-28 RX ORDER — DEXTROSE 50 % IN WATER 50 %
12.5 SYRINGE (ML) INTRAVENOUS ONCE
Qty: 0 | Refills: 0 | Status: DISCONTINUED | OUTPATIENT
Start: 2019-02-28 | End: 2019-03-04

## 2019-02-28 RX ORDER — OXYBUTYNIN CHLORIDE 5 MG
10 TABLET ORAL AT BEDTIME
Qty: 0 | Refills: 0 | Status: DISCONTINUED | OUTPATIENT
Start: 2019-02-28 | End: 2019-03-04

## 2019-02-28 RX ORDER — MINERAL OIL
133 OIL (ML) MISCELLANEOUS
Qty: 0 | Refills: 0 | Status: COMPLETED | OUTPATIENT
Start: 2019-02-28 | End: 2019-03-02

## 2019-02-28 RX ORDER — ZINC OXIDE 200 MG/G
1 OINTMENT TOPICAL
Qty: 0 | Refills: 0 | Status: DISCONTINUED | OUTPATIENT
Start: 2019-02-28 | End: 2019-03-04

## 2019-02-28 RX ORDER — CHOLECALCIFEROL (VITAMIN D3) 125 MCG
1000 CAPSULE ORAL DAILY
Qty: 0 | Refills: 0 | Status: DISCONTINUED | OUTPATIENT
Start: 2019-02-28 | End: 2019-03-04

## 2019-02-28 RX ORDER — ONDANSETRON 8 MG/1
4 TABLET, FILM COATED ORAL EVERY 6 HOURS
Qty: 0 | Refills: 0 | Status: DISCONTINUED | OUTPATIENT
Start: 2019-02-28 | End: 2019-03-04

## 2019-02-28 RX ORDER — ROPINIROLE 8 MG/1
3 TABLET, FILM COATED, EXTENDED RELEASE ORAL
Qty: 0 | Refills: 0 | COMMUNITY

## 2019-02-28 RX ORDER — ACETAMINOPHEN 500 MG
650 TABLET ORAL EVERY 6 HOURS
Qty: 0 | Refills: 0 | Status: DISCONTINUED | OUTPATIENT
Start: 2019-02-28 | End: 2019-03-04

## 2019-02-28 RX ORDER — ESCITALOPRAM OXALATE 10 MG/1
40 TABLET, FILM COATED ORAL AT BEDTIME
Qty: 0 | Refills: 0 | Status: DISCONTINUED | OUTPATIENT
Start: 2019-02-28 | End: 2019-03-04

## 2019-02-28 RX ORDER — ARIPIPRAZOLE 15 MG/1
30 TABLET ORAL DAILY
Qty: 0 | Refills: 0 | Status: DISCONTINUED | OUTPATIENT
Start: 2019-02-28 | End: 2019-03-04

## 2019-02-28 RX ORDER — CARBIDOPA AND LEVODOPA 25; 100 MG/1; MG/1
1 TABLET ORAL
Qty: 0 | Refills: 0 | Status: DISCONTINUED | OUTPATIENT
Start: 2019-02-28 | End: 2019-02-28

## 2019-02-28 RX ORDER — DEXTROSE 50 % IN WATER 50 %
15 SYRINGE (ML) INTRAVENOUS ONCE
Qty: 0 | Refills: 0 | Status: DISCONTINUED | OUTPATIENT
Start: 2019-02-28 | End: 2019-03-04

## 2019-02-28 RX ORDER — CARBIDOPA AND LEVODOPA 25; 100 MG/1; MG/1
1 TABLET ORAL
Qty: 0 | Refills: 0 | Status: DISCONTINUED | OUTPATIENT
Start: 2019-02-28 | End: 2019-03-04

## 2019-02-28 RX ORDER — INSULIN HUMAN 100 [IU]/ML
5 INJECTION, SOLUTION SUBCUTANEOUS ONCE
Qty: 0 | Refills: 0 | Status: COMPLETED | OUTPATIENT
Start: 2019-02-28 | End: 2019-02-28

## 2019-02-28 RX ORDER — GLUCAGON INJECTION, SOLUTION 0.5 MG/.1ML
1 INJECTION, SOLUTION SUBCUTANEOUS ONCE
Qty: 0 | Refills: 0 | Status: DISCONTINUED | OUTPATIENT
Start: 2019-02-28 | End: 2019-03-04

## 2019-02-28 RX ORDER — SODIUM CHLORIDE 9 MG/ML
2400 INJECTION INTRAMUSCULAR; INTRAVENOUS; SUBCUTANEOUS ONCE
Qty: 0 | Refills: 0 | Status: COMPLETED | OUTPATIENT
Start: 2019-02-28 | End: 2019-02-28

## 2019-02-28 RX ADMIN — CARBIDOPA AND LEVODOPA 1 TABLET(S): 25; 100 TABLET ORAL at 17:23

## 2019-02-28 RX ADMIN — Medication 2: at 23:51

## 2019-02-28 RX ADMIN — PANTOPRAZOLE SODIUM 40 MILLIGRAM(S): 20 TABLET, DELAYED RELEASE ORAL at 03:23

## 2019-02-28 RX ADMIN — Medication 10 MILLIGRAM(S): at 21:50

## 2019-02-28 RX ADMIN — TAMSULOSIN HYDROCHLORIDE 0.4 MILLIGRAM(S): 0.4 CAPSULE ORAL at 17:23

## 2019-02-28 RX ADMIN — POLYETHYLENE GLYCOL 3350 17 GRAM(S): 17 POWDER, FOR SOLUTION ORAL at 17:23

## 2019-02-28 RX ADMIN — ARIPIPRAZOLE 30 MILLIGRAM(S): 15 TABLET ORAL at 06:57

## 2019-02-28 RX ADMIN — LEVETIRACETAM 250 MILLIGRAM(S): 250 TABLET, FILM COATED ORAL at 17:23

## 2019-02-28 RX ADMIN — KETOCONAZOLE 1 APPLICATION(S): 20 AEROSOL, FOAM TOPICAL at 19:02

## 2019-02-28 RX ADMIN — CARBIDOPA AND LEVODOPA 2 TABLET(S): 25; 100 TABLET ORAL at 21:50

## 2019-02-28 RX ADMIN — DEXTROSE MONOHYDRATE, SODIUM CHLORIDE, AND POTASSIUM CHLORIDE 100 MILLILITER(S): 50; .745; 4.5 INJECTION, SOLUTION INTRAVENOUS at 17:18

## 2019-02-28 RX ADMIN — SODIUM CHLORIDE 1 GRAM(S): 9 INJECTION INTRAMUSCULAR; INTRAVENOUS; SUBCUTANEOUS at 14:01

## 2019-02-28 RX ADMIN — DIVALPROEX SODIUM 500 MILLIGRAM(S): 500 TABLET, DELAYED RELEASE ORAL at 21:50

## 2019-02-28 RX ADMIN — Medication 4: at 17:24

## 2019-02-28 RX ADMIN — Medication 1000 UNIT(S): at 14:00

## 2019-02-28 RX ADMIN — Medication 133 MILLILITER(S): at 18:19

## 2019-02-28 RX ADMIN — SODIUM CHLORIDE 1200 MILLILITER(S): 9 INJECTION INTRAMUSCULAR; INTRAVENOUS; SUBCUTANEOUS at 04:20

## 2019-02-28 RX ADMIN — PANTOPRAZOLE SODIUM 10 MG/HR: 20 TABLET, DELAYED RELEASE ORAL at 04:00

## 2019-02-28 RX ADMIN — Medication 8: at 11:00

## 2019-02-28 RX ADMIN — PANTOPRAZOLE SODIUM 40 MILLIGRAM(S): 20 TABLET, DELAYED RELEASE ORAL at 17:23

## 2019-02-28 RX ADMIN — CARBIDOPA AND LEVODOPA 1 TABLET(S): 25; 100 TABLET ORAL at 05:38

## 2019-02-28 RX ADMIN — SODIUM CHLORIDE 3 MILLILITER(S): 9 INJECTION INTRAMUSCULAR; INTRAVENOUS; SUBCUTANEOUS at 02:38

## 2019-02-28 RX ADMIN — Medication 133 MILLILITER(S): at 23:50

## 2019-02-28 RX ADMIN — ESCITALOPRAM OXALATE 40 MILLIGRAM(S): 10 TABLET, FILM COATED ORAL at 21:50

## 2019-02-28 NOTE — ED ADULT NURSE NOTE - OBJECTIVE STATEMENT
Patient BIBA complaining of vomiting. Patient coming from [facility], aide at bedside. As per aide, patient began vomiting one hour prior to arrival, dark brown emesis. Patient abdomen extended, bilateral swelling of lower extremities. Patient denies nausea on arrival. Aide states patient has had no recent fever, chills, diarrhea. Patient tachycardic, BP stable. PMHx Parkinsons, DM

## 2019-02-28 NOTE — ED ADULT TRIAGE NOTE - CHIEF COMPLAINT QUOTE
As per EMS, pt. from [address] group home and had 1 episode of coffee ground brown emesis, pt. denies n/d, abd. pain. pt. has hx of parkinson's

## 2019-02-28 NOTE — H&P ADULT - NSHPPHYSICALEXAM_GEN_ALL_CORE
Vital Signs Last 24 Hrs  T(C): 36.6 (28 Feb 2019 07:00), Max: 37 (28 Feb 2019 02:00)  T(F): 97.9 (28 Feb 2019 07:00), Max: 98.6 (28 Feb 2019 02:00)  HR: 108 (28 Feb 2019 07:00) (93 - 109)  BP: 140/86 (28 Feb 2019 07:00) (123/88 - 141/94)  RR: 20 (28 Feb 2019 07:00) (16 - 21)  SpO2: 96% (28 Feb 2019 07:00) (93% - 96%)    PHYSICAL EXAM:    General: Well developed; well nourished; in no acute distress  Eyes: PERRLA, EOMI; conjunctiva and sclera clear  Head: Normocephalic; atraumatic  ENMT: No nasal discharge; airway clear  Neck: Supple; non tender; no masses  Respiratory: No wheezes, rales or rhonchi  Cardiovascular: Regular rate and rhythm. S1 and S2 Normal; No murmurs, gallops or rubs  Gastrointestinal: Soft non-tender non-distended; Normal bowel sounds  Genitourinary: No costovertebral angle tenderness  Extremities: Normal range of motion, No clubbing, cyanosis or edema  Vascular: Peripheral pulses palpable 2+ bilaterally  Neurological: Alert and oriented x4  Skin: Warm and dry. No acute rash  Lymph Nodes: No acute cervical adenopathy  Musculoskeletal: Normal gait, tone, without deformities  Psychiatric: Cooperative and appropriate

## 2019-02-28 NOTE — ED PROVIDER NOTE - GASTROINTESTINAL, MLM
Impression: Type 2 diabetes mellitus without complications: C85.6. Plan: Diabetes type II: no background retinopathy, no signs of neovascularization noted. Discussed ocular and systemic benefits of blood sugar control. Abdomen soft, non-tender, no guarding.

## 2019-02-28 NOTE — ED PROVIDER NOTE - PMH
Developmental disability    Diabetes    Hypertension    Parkinson disease    Seizures    SIADH (syndrome of inappropriate ADH production)

## 2019-02-28 NOTE — H&P ADULT - ASSESSMENT
70 y/o M with PMH of  Mental Retardation, Parkinson's disease, HTN, seizure disorder, NIDDM, SIADH,  BPH, osteoporosis, GERD,  gait abnormality admitted for:     1. Vomiting, possible Upper GI bleed  admit to telemetry  Hemodynamically stable  NPO   IVF   Monitor H/H, stable for now  Started on IV PPI drip will continue  Hold ASA, no meds for DVT PPxs  GI eval       2. Lactatemia, possibly due to dehydration   also on metformin, hold  No signs of infection, monitor for fevers   Check UA and CXR   S/p IVF bolus  Repeat Lactate pending        3.  Parkinson Dz  stable  C/w Po meds   PT       4. DM  Type II with Hyperglycemia  Hold PO meds  Monitor BS Q6h while NPO, cover with ISS  check acetone   Check HB A1c       5. SIADH  NA stable  C/w NACL 1g daily       5. Seizure Disorder  C/w home meds: On Keppra and Depakote        6. MR, behavioral problems   C/w Home meds: Aripiprazole       7. DVT PPxs: Venodynes

## 2019-02-28 NOTE — H&P ADULT - HISTORY OF PRESENT ILLNESS
70 y/o M with PMH of  Mental Retardation, Parkinson's disease, HTN, seizure disorder, NIDDM, SIADH,  BPH, osteoporosis, GERD,  gait abnormality was brought to ED from Group home due to vomiting coffee  grounds.  Pt is unable to provide history due to MR. As per aid from group home, Pt start vomiting at 1 am, large amount first time, also had 2 more episodes with small amount but looked the same. Pt was in his usual  health until then.  Aid also mentioned that Pt  is less ambulatory due to Parkinson shaking, uses wheelchair  or  rolling walker with assistance.   Pt reports " I feel better now", denies Nausea oe abdominal pain. Denies other complains.   ·	    In ED: BP  stable, mildly tachycardic,  labs done H/H stable, lactate elevated.  Had  another episode of vomiting in ED. Pt was given 2.4L of  NS Bolus. Was evaluated by Intensivist no ICU admission needed at this time

## 2019-02-28 NOTE — ED PROVIDER NOTE - OBJECTIVE STATEMENT
69 year old male with PMH of seizures, resident of group home, hx of dementia, mild MR?, BIB group home staff with cc of vomiting coffee ground emesis, and abdominal distention. No fever or chills. No trauma. No recent exotic travel. 69 year old male with PMH of seizures, resident of group home, hx of dementia, mild MR?, BIB group home staff with cc of vomiting coffee ground emesis, and abdominal distention. No fever or chills. No trauma. No recent exotic travel. No recent trauma. No blood in stool.

## 2019-02-28 NOTE — CONSULT NOTE ADULT - SUBJECTIVE AND OBJECTIVE BOX
HPI:  70 y/o M with PMH of  Mental Retardation, Parkinson's disease, HTN, seizure disorder, NIDDM, SIADH,  BPH, osteoporosis, GERD,  gait abnormality was brought to ED from Group home due to vomiting coffee  grounds.  Pt is unable to provide history due to MR. As per aid from group home, Pt start vomiting at 1 am, large amount first time, also had 2 more episodes with small amount but looked the same. Pt was in his usual  health until then.  Aid also mentioned that Pt  is less ambulatory due to Parkinson shaking, uses wheelchair  or  rolling walker with assistance.   Pt reports " I feel better now", denies Nausea oe abdominal pain. Denies other complains.   -------------------------  Abdominal ultrasound showed no ascites.    In ED: BP  stable, mildly tachycardic,  labs done H/H stable, lactate elevated.  Had  another episode of vomiting in ED. Pt was given 2.4L of  NS Bolus. Was evaluated by Intensivist no ICU admission needed at this time (28 Feb 2019 08:59)      PAST MEDICAL & SURGICAL HISTORY:  SIADH (syndrome of inappropriate ADH production)  Diabetes  Developmental disability  Hypertension  Seizures  Parkinson disease  H/O foot surgery      Home Medications:  acetaminophen 500 mg oral tablet: 2 tab(s) orally every 8 hours, As Needed - for moderate pain (28 Feb 2019 08:58)  ARIPiprazole 30 mg oral tablet: 1 tab(s) orally once a day (28 Feb 2019 08:58)  aspirin 81 mg oral delayed release tablet: 1 tab(s) orally once a day (28 Feb 2019 08:58)  Balmex topical ointment: Apply topically to affected area [diaper area and lower cortal area], As Needed - for rash with every diaper change (28 Feb 2019 08:58)  Calcium 600+D 600 mg-200 intl units oral tablet: 1 tab(s) orally 3 times a day (28 Feb 2019 08:58)  carbidopa-levodopa 25 mg-100 mg oral tablet: 2 tab(s) orally 2 times a day (28 Feb 2019 08:58)  carbidopa-levodopa 25 mg-100 mg oral tablet: 1 tab(s) orally once a day at 4 pm (28 Feb 2019 08:58)  cholecalciferol 1000 intl units oral tablet: 1 tab(s) orally once a day (28 Feb 2019 08:58)  ciclopirox 0.77% topical cream: Apply topically to affected area 2 times a day (28 Feb 2019 08:58)  divalproex sodium 500 mg oral tablet, extended release: 2 tab(s) orally once a day (at bedtime) (28 Feb 2019 08:58)  docusate sodium 100 mg oral capsule: 2 cap(s) orally once a day (28 Feb 2019 08:58)  escitalopram 20 mg oral tablet: 2 tab(s) orally once a day (at bedtime) (28 Feb 2019 08:58)  ferrous sulfate 325 mg (65 mg elemental iron) oral tablet: 1 tab(s) orally 2 times a day (28 Feb 2019 08:58)  glimepiride 1 mg oral tablet: 0.5 tab(s) orally 2 times a day with breakfast and dinner (28 Feb 2019 08:58)  Keppra 250 mg oral tablet: 1 tab(s) orally 2 times a day (28 Feb 2019 08:58)  ketoconazole 2% topical shampoo: Apply topically to affected area every other day (at bedtime) (28 Feb 2019 08:58)  loratadine 10 mg oral tablet: 1 tab(s) orally once a day (28 Feb 2019 08:58)  metFORMIN 1000 mg oral tablet: 1 tab(s) orally 2 times a day (with meals) (28 Feb 2019 08:58)  oxybutynin 10 mg/24 hr oral tablet, extended release: 1 tab(s) orally once a day (at bedtime) (28 Feb 2019 08:58)  Sodium Chloride 1000 mg oral tablet, soluble: 1 tab(s) orally once a day (28 Feb 2019 08:58)  tamsulosin 0.4 mg oral capsule: 1 cap(s) orally 2 times a day (28 Feb 2019 08:58)      MEDICATIONS  (STANDING):  ARIPiprazole 30 milliGRAM(s) Oral daily  carbidopa/levodopa  10/100 2 Tablet(s) Oral <User Schedule>  carbidopa/levodopa  25/100 1 Tablet(s) Oral <User Schedule>  cholecalciferol 1000 Unit(s) Oral daily  dextrose 5%. 1000 milliLiter(s) (50 mL/Hr) IV Continuous <Continuous>  dextrose 50% Injectable 12.5 Gram(s) IV Push once  dextrose 50% Injectable 25 Gram(s) IV Push once  dextrose 50% Injectable 25 Gram(s) IV Push once  diVALproex  milliGRAM(s) Oral at bedtime  escitalopram 40 milliGRAM(s) Oral at bedtime  insulin lispro (HumaLOG) corrective regimen sliding scale   SubCutaneous every 6 hours  insulin regular  human recombinant. 5 Unit(s) SubCutaneous once  ketoconazole 2% Shampoo 1 Application(s) Topical <User Schedule>  levETIRAcetam 250 milliGRAM(s) Oral two times a day  oxybutynin 10 milliGRAM(s) Oral at bedtime  pantoprazole Infusion 8 mG/Hr (10 mL/Hr) IV Continuous <Continuous>  sodium chloride 1 Gram(s) Oral daily  sodium chloride 0.9% with potassium chloride 20 mEq/L 1000 milliLiter(s) (100 mL/Hr) IV Continuous <Continuous>  tamsulosin 0.4 milliGRAM(s) Oral two times a day    MEDICATIONS  (PRN):  acetaminophen   Tablet .. 650 milliGRAM(s) Oral every 6 hours PRN Temp greater or equal to 38C (100.4F), Mild Pain (1 - 3)  dextrose 40% Gel 15 Gram(s) Oral once PRN Blood Glucose LESS THAN 70 milliGRAM(s)/deciliter  docusate sodium 100 milliGRAM(s) Oral two times a day PRN Constipation  glucagon  Injectable 1 milliGRAM(s) IntraMuscular once PRN Glucose LESS THAN 70 milligrams/deciliter  ondansetron Injectable 4 milliGRAM(s) IV Push every 6 hours PRN Nausea  senna 2 Tablet(s) Oral at bedtime PRN Constipation  zinc oxide 20% Ointment 1 Application(s) Topical four times a day PRN diaper change      Allergies    No Known Allergies    Intolerances        SOCIAL HISTORY:    FAMILY HISTORY:  No pertinent family history in first degree relatives      ROS  As above  Otherwise unremarkable    Vital Signs Last 24 Hrs  T(C): 36.6 (28 Feb 2019 07:00), Max: 37 (28 Feb 2019 02:00)  T(F): 97.9 (28 Feb 2019 07:00), Max: 98.6 (28 Feb 2019 02:00)  HR: 108 (28 Feb 2019 07:00) (93 - 109)  BP: 140/86 (28 Feb 2019 07:00) (123/88 - 141/94)  BP(mean): --  RR: 20 (28 Feb 2019 07:00) (16 - 21)  SpO2: 96% (28 Feb 2019 07:00) (93% - 96%)    Constitutional: NAD, well-developed  Respiratory: CTAB  Cardiovascular: S1 and S2, RRR  Gastrointestinal: BS+, soft, moderate ascites.  Extremities: No peripheral edema  Psychiatric: Normal mood, normal affect  Skin: No rashes    LABS:                        12.6   10.22 )-----------( 168      ( 28 Feb 2019 09:19 )             37.2     02-28    141  |  103  |  29<H>  ----------------------------<  351<H>  4.1   |  29  |  0.81    Ca    8.9      28 Feb 2019 09:19  Phos  3.4     02-28  Mg     1.6     02-28    TPro  7.0  /  Alb  3.4  /  TBili  0.3  /  DBili  x   /  AST  12<L>  /  ALT  12  /  AlkPhos  71  02-28    PT/INR - ( 28 Feb 2019 02:06 )   PT: 13.0 sec;   INR: 1.17 ratio         PTT - ( 28 Feb 2019 02:06 )  PTT:37.6 sec  LIVER FUNCTIONS - ( 28 Feb 2019 02:06 )  Alb: 3.4 g/dL / Pro: 7.0 gm/dL / ALK PHOS: 71 U/L / ALT: 12 U/L / AST: 12 U/L / GGT: x             RADIOLOGY & ADDITIONAL STUDIES:

## 2019-02-28 NOTE — PROGRESS NOTE ADULT - SUBJECTIVE AND OBJECTIVE BOX
Asked to see pt by Dr Avelar (ED MD) due to multiple bouts of coffee ground emesis and concern for GI bleeding.  Seen in ED at 03:00 am.      70yo M hx of reflux esophagitis (noted on EGD by Dr Shields in 9/2018) with UGI bleeding/coffee ground emesis in past - now presents with same.  Mildly tachycardic but BP WNL.    Treated with Protonix infusion and transfusion of 1u PRBC.  Dr Avelar spoke with Dr Bocanegra from GI on call.  Pt is AA and interactive, NAD/NRD.  Vomiting has now subsided for last hour or so.    Lactate 5.0 noted - No suspicion of sepsis at this time but will treat with IVF.  Pt hemoconcentrated (Hgb 13) as compared with prior admissions (Hgb 10).        PAST MEDICAL & SURGICAL HISTORY:  Hypertension  Seizures  Parkinson disease  H/O foot surgery      FAMILY HISTORY:  No pertinent family history in first degree relatives      Social Hx: Group home resident.    Allergies    No Known Allergies    Height (cm): 180.34 (02-28 @ 01:41)  Weight (kg): 79.4 (02-28 @ 01:41)  BMI (kg/m2): 24.4 (02-28 @ 01:41)    ICU Vital Signs Last 24 Hrs  T(C): 36.4 (28 Feb 2019 03:48), Max: 37 (28 Feb 2019 02:00)  T(F): 97.6 (28 Feb 2019 03:48), Max: 98.6 (28 Feb 2019 02:00)  HR: 105 (28 Feb 2019 03:48) (105 - 109)  BP: 127/84 (28 Feb 2019 03:48) (127/84 - 141/94)  BP(mean): --  ABP: --  ABP(mean): --  RR: 16 (28 Feb 2019 03:48) (16 - 18)  SpO2: 93% (28 Feb 2019 03:48) (93% - 95%)          I&O's Summary                            13.1   7.56  )-----------( 222      ( 28 Feb 2019 02:06 )             39.0       02-28    137  |  96  |  28<H>  ----------------------------<  392<H>  4.1   |  29  |  1.09    Ca    9.9      28 Feb 2019 02:06    TPro  7.0  /  Alb  3.4  /  TBili  0.3  /  DBili  x   /  AST  12<L>  /  ALT  12  /  AlkPhos  71  02-28      CAPILLARY BLOOD GLUCOSE          LIVER FUNCTIONS - ( 28 Feb 2019 02:06 )  Alb: 3.4 g/dL / Pro: 7.0 gm/dL / ALK PHOS: 71 U/L / ALT: 12 U/L / AST: 12 U/L / GGT: x                   PT/INR - ( 28 Feb 2019 02:06 )   PT: 13.0 sec;   INR: 1.17 ratio         PTT - ( 28 Feb 2019 02:06 )  PTT:37.6 sec            MEDICATIONS  (STANDING):  pantoprazole Infusion 8 mG/Hr (10 mL/Hr) IV Continuous <Continuous>    MEDICATIONS  (PRN):          DVT Prophylaxis: No chemoprophy due to GI bleeding, venodynes    Advanced Directives: FULL  Discussed with: default - unable to contact family.    Visit Information:  Consult Dr Avelar    ** Time is exclusive of billed procedures and/or teaching and/or routine family updates. Asked to see pt by Dr Avelar (ED MD) due to multiple bouts of coffee ground emesis and concern for GI bleeding.  Seen in ED at 03:40 am.      70yo M hx of reflux esophagitis (noted on EGD by Dr Shields in 9/2018) with UGI bleeding/coffee ground emesis in past - now presents with same.  Mildly tachycardic but BP WNL.    Treated with Protonix infusion and transfusion of 1u PRBC.  Dr Avelar spoke with Dr Bocanegra from GI on call.  Pt is AA and interactive, NAD/NRD.  Vomiting has now subsided for last hour or so.    Lactate 5.0 noted - No suspicion of sepsis at this time but will treat with IVF.  Pt hemoconcentrated (Hgb 13) as compared with prior admissions (Hgb 10).        PAST MEDICAL & SURGICAL HISTORY:  Hypertension  Seizures  Parkinson disease  H/O foot surgery      FAMILY HISTORY:  No pertinent family history in first degree relatives      Social Hx: Group home resident.    Allergies    No Known Allergies    Height (cm): 180.34 (02-28 @ 01:41)  Weight (kg): 79.4 (02-28 @ 01:41)  BMI (kg/m2): 24.4 (02-28 @ 01:41)    ICU Vital Signs Last 24 Hrs  T(C): 36.4 (28 Feb 2019 03:48), Max: 37 (28 Feb 2019 02:00)  T(F): 97.6 (28 Feb 2019 03:48), Max: 98.6 (28 Feb 2019 02:00)  HR: 105 (28 Feb 2019 03:48) (105 - 109)  BP: 127/84 (28 Feb 2019 03:48) (127/84 - 141/94)  BP(mean): --  ABP: --  ABP(mean): --  RR: 16 (28 Feb 2019 03:48) (16 - 18)  SpO2: 93% (28 Feb 2019 03:48) (93% - 95%)          I&O's Summary                            13.1   7.56  )-----------( 222      ( 28 Feb 2019 02:06 )             39.0       02-28    137  |  96  |  28<H>  ----------------------------<  392<H>  4.1   |  29  |  1.09    Ca    9.9      28 Feb 2019 02:06    TPro  7.0  /  Alb  3.4  /  TBili  0.3  /  DBili  x   /  AST  12<L>  /  ALT  12  /  AlkPhos  71  02-28      CAPILLARY BLOOD GLUCOSE          LIVER FUNCTIONS - ( 28 Feb 2019 02:06 )  Alb: 3.4 g/dL / Pro: 7.0 gm/dL / ALK PHOS: 71 U/L / ALT: 12 U/L / AST: 12 U/L / GGT: x                   PT/INR - ( 28 Feb 2019 02:06 )   PT: 13.0 sec;   INR: 1.17 ratio         PTT - ( 28 Feb 2019 02:06 )  PTT:37.6 sec            MEDICATIONS  (STANDING):  pantoprazole Infusion 8 mG/Hr (10 mL/Hr) IV Continuous <Continuous>    MEDICATIONS  (PRN):          DVT Prophylaxis: No chemoprophy due to GI bleeding, venodynes    Advanced Directives: FULL  Discussed with: default - unable to contact family.    Visit Information:  Consult Dr Avelar    ** Time is exclusive of billed procedures and/or teaching and/or routine family updates.

## 2019-02-28 NOTE — CONSULT NOTE ADULT - ASSESSMENT
Imp:  coffee ground emesis, probably esophagitis  Abdominal distension, ?constipation    Rec:  F/U CT abdomen  Cont protonix

## 2019-02-28 NOTE — PHARMACOTHERAPY INTERVENTION NOTE - COMMENTS
Completed medication history as per group home paperwork  *PD medications: Sinemet 25/100 mg 2 tabs twice daily @ 7 am and 8 pm, and 25/100 mg 1 tab once daily @ 4 pm

## 2019-02-28 NOTE — ED ADULT NURSE REASSESSMENT NOTE - NS ED NURSE REASSESS COMMENT FT1
0145: Patient began actively vomiting coffee grind emesis, approx 500 ML, patient moved to trauma room, VS stable. Family unable to reached by Dr Avelar. Emergent blood ordered at this time. PT alert and appropriately responsive, at baseline mental status. ICU consult called and bedside assessment done

## 2019-02-28 NOTE — ED ADULT NURSE NOTE - NSIMPLEMENTINTERV_GEN_ALL_ED
Implemented All Fall with Harm Risk Interventions:  [place] to call system. Call bell, personal items and telephone within reach. Instruct patient to call for assistance. Room bathroom lighting operational. Non-slip footwear when patient is off stretcher. Physically safe environment: no spills, clutter or unnecessary equipment. Stretcher in lowest position, wheels locked, appropriate side rails in place. Provide visual cue, wrist band, yellow gown, etc. Monitor gait and stability. Monitor for mental status changes and reorient to person, place, and time. Review medications for side effects contributing to fall risk. Reinforce activity limits and safety measures with patient and family. Provide visual clues: red socks.

## 2019-03-01 LAB
ANION GAP SERPL CALC-SCNC: 9 MMOL/L — SIGNIFICANT CHANGE UP (ref 5–17)
BUN SERPL-MCNC: 31 MG/DL — HIGH (ref 7–23)
CALCIUM SERPL-MCNC: 8.7 MG/DL — SIGNIFICANT CHANGE UP (ref 8.5–10.1)
CHLORIDE SERPL-SCNC: 110 MMOL/L — HIGH (ref 96–108)
CO2 SERPL-SCNC: 28 MMOL/L — SIGNIFICANT CHANGE UP (ref 22–31)
CREAT SERPL-MCNC: 0.73 MG/DL — SIGNIFICANT CHANGE UP (ref 0.5–1.3)
GLUCOSE BLDC GLUCOMTR-MCNC: 148 MG/DL — HIGH (ref 70–99)
GLUCOSE BLDC GLUCOMTR-MCNC: 152 MG/DL — HIGH (ref 70–99)
GLUCOSE BLDC GLUCOMTR-MCNC: 155 MG/DL — HIGH (ref 70–99)
GLUCOSE BLDC GLUCOMTR-MCNC: 168 MG/DL — HIGH (ref 70–99)
GLUCOSE SERPL-MCNC: 172 MG/DL — HIGH (ref 70–99)
HBA1C BLD-MCNC: 7.1 % — HIGH (ref 4–5.6)
HCT VFR BLD CALC: 34.5 % — LOW (ref 39–50)
HGB BLD-MCNC: 11.7 G/DL — LOW (ref 13–17)
MCHC RBC-ENTMCNC: 32.4 PG — SIGNIFICANT CHANGE UP (ref 27–34)
MCHC RBC-ENTMCNC: 33.9 GM/DL — SIGNIFICANT CHANGE UP (ref 32–36)
MCV RBC AUTO: 95.6 FL — SIGNIFICANT CHANGE UP (ref 80–100)
NRBC # BLD: 0 /100 WBCS — SIGNIFICANT CHANGE UP (ref 0–0)
PLATELET # BLD AUTO: 164 K/UL — SIGNIFICANT CHANGE UP (ref 150–400)
POTASSIUM SERPL-MCNC: 4.1 MMOL/L — SIGNIFICANT CHANGE UP (ref 3.5–5.3)
POTASSIUM SERPL-SCNC: 4.1 MMOL/L — SIGNIFICANT CHANGE UP (ref 3.5–5.3)
RBC # BLD: 3.61 M/UL — LOW (ref 4.2–5.8)
RBC # FLD: 12.9 % — SIGNIFICANT CHANGE UP (ref 10.3–14.5)
SODIUM SERPL-SCNC: 147 MMOL/L — HIGH (ref 135–145)
VALPROATE SERPL-MCNC: 41 UG/ML — LOW (ref 50–100)
WBC # BLD: 6.77 K/UL — SIGNIFICANT CHANGE UP (ref 3.8–10.5)
WBC # FLD AUTO: 6.77 K/UL — SIGNIFICANT CHANGE UP (ref 3.8–10.5)

## 2019-03-01 RX ADMIN — LEVETIRACETAM 250 MILLIGRAM(S): 250 TABLET, FILM COATED ORAL at 18:14

## 2019-03-01 RX ADMIN — Medication 133 MILLILITER(S): at 06:01

## 2019-03-01 RX ADMIN — CARBIDOPA AND LEVODOPA 2 TABLET(S): 25; 100 TABLET ORAL at 06:01

## 2019-03-01 RX ADMIN — Medication 1 BOTTLE: at 12:43

## 2019-03-01 RX ADMIN — Medication 2: at 12:43

## 2019-03-01 RX ADMIN — LEVETIRACETAM 250 MILLIGRAM(S): 250 TABLET, FILM COATED ORAL at 06:01

## 2019-03-01 RX ADMIN — DIVALPROEX SODIUM 500 MILLIGRAM(S): 500 TABLET, DELAYED RELEASE ORAL at 22:10

## 2019-03-01 RX ADMIN — CARBIDOPA AND LEVODOPA 1 TABLET(S): 25; 100 TABLET ORAL at 18:17

## 2019-03-01 RX ADMIN — Medication 1000 UNIT(S): at 12:43

## 2019-03-01 RX ADMIN — TAMSULOSIN HYDROCHLORIDE 0.4 MILLIGRAM(S): 0.4 CAPSULE ORAL at 18:14

## 2019-03-01 RX ADMIN — Medication 2: at 18:15

## 2019-03-01 RX ADMIN — PANTOPRAZOLE SODIUM 40 MILLIGRAM(S): 20 TABLET, DELAYED RELEASE ORAL at 06:01

## 2019-03-01 RX ADMIN — TAMSULOSIN HYDROCHLORIDE 0.4 MILLIGRAM(S): 0.4 CAPSULE ORAL at 06:01

## 2019-03-01 RX ADMIN — DEXTROSE MONOHYDRATE, SODIUM CHLORIDE, AND POTASSIUM CHLORIDE 100 MILLILITER(S): 50; .745; 4.5 INJECTION, SOLUTION INTRAVENOUS at 03:24

## 2019-03-01 RX ADMIN — ARIPIPRAZOLE 30 MILLIGRAM(S): 15 TABLET ORAL at 12:43

## 2019-03-01 RX ADMIN — CARBIDOPA AND LEVODOPA 2 TABLET(S): 25; 100 TABLET ORAL at 20:05

## 2019-03-01 RX ADMIN — Medication 10 MILLIGRAM(S): at 22:10

## 2019-03-01 RX ADMIN — ESCITALOPRAM OXALATE 40 MILLIGRAM(S): 10 TABLET, FILM COATED ORAL at 22:11

## 2019-03-01 RX ADMIN — POLYETHYLENE GLYCOL 3350 17 GRAM(S): 17 POWDER, FOR SOLUTION ORAL at 12:43

## 2019-03-01 RX ADMIN — DEXTROSE MONOHYDRATE, SODIUM CHLORIDE, AND POTASSIUM CHLORIDE 100 MILLILITER(S): 50; .745; 4.5 INJECTION, SOLUTION INTRAVENOUS at 13:55

## 2019-03-01 RX ADMIN — Medication 2: at 23:47

## 2019-03-01 RX ADMIN — SODIUM CHLORIDE 1 GRAM(S): 9 INJECTION INTRAMUSCULAR; INTRAVENOUS; SUBCUTANEOUS at 12:43

## 2019-03-01 NOTE — PROGRESS NOTE ADULT - SUBJECTIVE AND OBJECTIVE BOX
68 y/o M with PMH of  Mental Retardation, Parkinson's disease, HTN, seizure disorder, NIDDM, SIADH,  BPH, osteoporosis, GERD,  gait abnormality was brought to ED from Group home due to vomiting coffee  grounds.  Pt is unable to provide history due to MR. As per aid from group home, Pt start vomiting at 1 am, large amount first time, also had 2 more episodes with small amount but looked the same. Pt was in his usual  health until then.  Aid also mentioned that Pt  is less ambulatory due to Parkinson shaking, uses wheelchair  or  rolling walker with assistance.   Pt reports " I feel better now", denies Nausea oe abdominal pain. Denies other complains.   In ED: BP  stable, mildly tachycardic,  labs done H/H stable, lactate elevated.  Had  another episode of vomiting in ED. Pt was given 2.4L of  NS Bolus. Was evaluated by Intensivist no ICU admission needed at this time          03/01/19: Patient seen and examined. Complaining of constipation.     Vital Signs Last 24 Hrs  T(C): 36.7 (01 Mar 2019 11:40), Max: 37.6 (28 Feb 2019 18:35)  T(F): 98.1 (01 Mar 2019 11:40), Max: 99.6 (28 Feb 2019 18:35)  HR: 91 (01 Mar 2019 11:40) (77 - 98)  BP: 109/70 (01 Mar 2019 11:40) (109/70 - 124/84)  BP(mean): --  RR: 17 (01 Mar 2019 11:40) (16 - 18)  SpO2: 95% (01 Mar 2019 11:40) (94% - 98%)        	PHYSICAL EXAM:    	General: Well developed; well nourished; in no acute distress  	Eyes: PERRLA, EOMI; conjunctiva and sclera clear  	Head: Normocephalic; atraumatic  	ENMT: No nasal discharge; airway clear  	Neck: Supple; non tender; no masses  	Respiratory: No wheezes, rales or rhonchi  	Cardiovascular: Regular rate and rhythm. S1 and S2 Normal; No murmurs, gallops or rubs  	Gastrointestinal: Soft non-tender non-distended; Normal bowel sounds  	Genitourinary: No costovertebral angle tenderness  	Extremities: Normal range of motion, No clubbing, cyanosis or edema  	Vascular: Peripheral pulses palpable 2+ bilaterally  	Neurological: Alert and oriented x4  	Skin: Warm and dry. No acute rash  	Lymph Nodes: No acute cervical adenopathy  	Musculoskeletal: Normal gait, tone, without deformities  Psychiatric: Cooperative and appropriate.            Labs:                                 11.7   6.77  )-----------( 164      ( 01 Mar 2019 07:36 )             34.5     01 Mar 2019 07:36    147    |  110    |  31     ----------------------------<  172    4.1     |  28     |  0.73     Ca    8.7        01 Mar 2019 07:36  Phos  3.4       28 Feb 2019 09:19  Mg     1.6       28 Feb 2019 09:19    TPro  7.0    /  Alb  3.4    /  TBili  0.3    /  DBili  x      /  AST  12     /  ALT  12     /  AlkPhos  71     28 Feb 2019 02:06    LIVER FUNCTIONS - ( 28 Feb 2019 02:06 )  Alb: 3.4 g/dL / Pro: 7.0 gm/dL / ALK PHOS: 71 U/L / ALT: 12 U/L / AST: 12 U/L / GGT: x           PT/INR - ( 28 Feb 2019 02:06 )   PT: 13.0 sec;   INR: 1.17 ratio         PTT - ( 28 Feb 2019 02:06 )  PTT:37.6 sec  CAPILLARY BLOOD GLUCOSE      POCT Blood Glucose.: 168 mg/dL (01 Mar 2019 12:29)  POCT Blood Glucose.: 148 mg/dL (01 Mar 2019 05:26)  POCT Blood Glucose.: 186 mg/dL (28 Feb 2019 23:41)  POCT Blood Glucose.: 204 mg/dL (28 Feb 2019 17:22)    CARDIAC MARKERS ( 28 Feb 2019 09:19 )  <0.015 ng/mL / x     / x     / x     / x                  MEDICATIONS:    ARIPiprazole 30 milliGRAM(s) Oral daily  carbidopa/levodopa  25/100 1 Tablet(s) Oral <User Schedule>  carbidopa/levodopa  25/100 2 Tablet(s) Oral <User Schedule>  cholecalciferol 1000 Unit(s) Oral daily  dextrose 5%. 1000 milliLiter(s) (50 mL/Hr) IV Continuous <Continuous>  dextrose 50% Injectable 12.5 Gram(s) IV Push once  dextrose 50% Injectable 25 Gram(s) IV Push once  dextrose 50% Injectable 25 Gram(s) IV Push once  diVALproex  milliGRAM(s) Oral at bedtime  escitalopram 40 milliGRAM(s) Oral at bedtime  insulin lispro (HumaLOG) corrective regimen sliding scale   SubCutaneous every 6 hours  ketoconazole 2% Shampoo 1 Application(s) Topical <User Schedule>  levETIRAcetam 250 milliGRAM(s) Oral two times a day  mineral oil enema 133 milliLiter(s) Rectal four times a day  oxybutynin 10 milliGRAM(s) Oral at bedtime  pantoprazole    Tablet 40 milliGRAM(s) Oral before breakfast  polyethylene glycol 3350 17 Gram(s) Oral daily  sodium chloride 1 Gram(s) Oral daily  sodium chloride 0.9% with potassium chloride 20 mEq/L 1000 milliLiter(s) (100 mL/Hr) IV Continuous <Continuous>  tamsulosin 0.4 milliGRAM(s) Oral two times a day    MEDICATIONS  (PRN):  acetaminophen   Tablet .. 650 milliGRAM(s) Oral every 6 hours PRN Temp greater or equal to 38C (100.4F), Mild Pain (1 - 3)  dextrose 40% Gel 15 Gram(s) Oral once PRN Blood Glucose LESS THAN 70 milliGRAM(s)/deciliter  docusate sodium 100 milliGRAM(s) Oral two times a day PRN Constipation  glucagon  Injectable 1 milliGRAM(s) IntraMuscular once PRN Glucose LESS THAN 70 milligrams/deciliter  magnesium citrate Oral Solution 1 Bottle Oral daily PRN Constipation  ondansetron Injectable 4 milliGRAM(s) IV Push every 6 hours PRN Nausea  senna 2 Tablet(s) Oral at bedtime PRN Constipation  zinc oxide 20% Ointment 1 Application(s) Topical four times a day PRN diaper change          Assessment and Plan:   Assessment:  · Assessment		  68 y/o M with PMH of  Mental Retardation, Parkinson's disease, HTN, seizure disorder, NIDDM, SIADH,  BPH, osteoporosis, GERD,  gait abnormality admitted for:     1. Vomiting, possible Upper GI bleed from esophagitis  Constipation  Vomiting improved.   No more coffee ground emesis  Follow CBC  Dr Shields consult appreciated  Mg citrate for constipation.     2. Lactic acidosis, possibly due to dehydration  Resolved   also on metformin, hold  No signs of infection, monitor for fevers   S/p IVF bolus  Repeat Lactate 1.9       3.  Parkinson Dz  stable  C/w Po meds   PT eval      4. DM  Type II with Hyperglycemia  Hold PO meds  Monitor BS Q6h while NPO, cover with ISS    5. SIADH  NA stable  C/w NACL 1g daily       5. Seizure Disorder  C/w home meds: On Keppra and Depakote        6. MR, behavioral problems   C/w Home meds: Aripiprazole       7. DVT PPxs: Venodynes

## 2019-03-01 NOTE — PROGRESS NOTE ADULT - SUBJECTIVE AND OBJECTIVE BOX
Patient is a 69y old  Male who presents with a chief complaint of vomiting (01 Mar 2019 13:08)      Subective:  No BMs.    PAST MEDICAL & SURGICAL HISTORY:  SIADH (syndrome of inappropriate ADH production)  Diabetes  Developmental disability  Hypertension  Seizures  Parkinson disease  H/O foot surgery      MEDICATIONS  (STANDING):  ARIPiprazole 30 milliGRAM(s) Oral daily  carbidopa/levodopa  25/100 1 Tablet(s) Oral <User Schedule>  carbidopa/levodopa  25/100 2 Tablet(s) Oral <User Schedule>  cholecalciferol 1000 Unit(s) Oral daily  dextrose 5%. 1000 milliLiter(s) (50 mL/Hr) IV Continuous <Continuous>  dextrose 50% Injectable 12.5 Gram(s) IV Push once  dextrose 50% Injectable 25 Gram(s) IV Push once  dextrose 50% Injectable 25 Gram(s) IV Push once  diVALproex  milliGRAM(s) Oral at bedtime  escitalopram 40 milliGRAM(s) Oral at bedtime  insulin lispro (HumaLOG) corrective regimen sliding scale   SubCutaneous every 6 hours  ketoconazole 2% Shampoo 1 Application(s) Topical <User Schedule>  levETIRAcetam 250 milliGRAM(s) Oral two times a day  mineral oil enema 133 milliLiter(s) Rectal four times a day  oxybutynin 10 milliGRAM(s) Oral at bedtime  pantoprazole    Tablet 40 milliGRAM(s) Oral before breakfast  polyethylene glycol 3350 17 Gram(s) Oral daily  sodium chloride 1 Gram(s) Oral daily  sodium chloride 0.9% with potassium chloride 20 mEq/L 1000 milliLiter(s) (100 mL/Hr) IV Continuous <Continuous>  tamsulosin 0.4 milliGRAM(s) Oral two times a day    MEDICATIONS  (PRN):  acetaminophen   Tablet .. 650 milliGRAM(s) Oral every 6 hours PRN Temp greater or equal to 38C (100.4F), Mild Pain (1 - 3)  dextrose 40% Gel 15 Gram(s) Oral once PRN Blood Glucose LESS THAN 70 milliGRAM(s)/deciliter  docusate sodium 100 milliGRAM(s) Oral two times a day PRN Constipation  glucagon  Injectable 1 milliGRAM(s) IntraMuscular once PRN Glucose LESS THAN 70 milligrams/deciliter  magnesium citrate Oral Solution 1 Bottle Oral daily PRN Constipation  ondansetron Injectable 4 milliGRAM(s) IV Push every 6 hours PRN Nausea  senna 2 Tablet(s) Oral at bedtime PRN Constipation  zinc oxide 20% Ointment 1 Application(s) Topical four times a day PRN diaper change      REVIEW OF SYSTEMS:    RESPIRATORY: No shortness of breath  CARDIOVASCULAR: No chest pain  All other review of systems is negative unless indicated above.    Vital Signs Last 24 Hrs  T(C): 36.7 (01 Mar 2019 11:40), Max: 37.6 (28 Feb 2019 18:35)  T(F): 98.1 (01 Mar 2019 11:40), Max: 99.6 (28 Feb 2019 18:35)  HR: 91 (01 Mar 2019 11:40) (77 - 93)  BP: 109/70 (01 Mar 2019 11:40) (109/70 - 119/85)  BP(mean): --  RR: 17 (01 Mar 2019 11:40) (16 - 18)  SpO2: 95% (01 Mar 2019 11:40) (95% - 98%)    PHYSICAL EXAM:    Constitutional: NAD, well-developed  Respiratory: CTAB  Cardiovascular: S1 and S2, RRR  Gastrointestinal: BS+, soft, NT/ND  Extremities: No peripheral edema  Psychiatric: Normal mood, normal affect    LABS:                        11.7   6.77  )-----------( 164      ( 01 Mar 2019 07:36 )             34.5     03-01    147<H>  |  110<H>  |  31<H>  ----------------------------<  172<H>  4.1   |  28  |  0.73    Ca    8.7      01 Mar 2019 07:36  Phos  3.4     02-28  Mg     1.6     02-28    TPro  7.0  /  Alb  3.4  /  TBili  0.3  /  DBili  x   /  AST  12<L>  /  ALT  12  /  AlkPhos  71  02-28    PT/INR - ( 28 Feb 2019 02:06 )   PT: 13.0 sec;   INR: 1.17 ratio         PTT - ( 28 Feb 2019 02:06 )  PTT:37.6 sec  LIVER FUNCTIONS - ( 28 Feb 2019 02:06 )  Alb: 3.4 g/dL / Pro: 7.0 gm/dL / ALK PHOS: 71 U/L / ALT: 12 U/L / AST: 12 U/L / GGT: x             RADIOLOGY & ADDITIONAL STUDIES:

## 2019-03-02 LAB
GLUCOSE BLDC GLUCOMTR-MCNC: 114 MG/DL — HIGH (ref 70–99)
GLUCOSE BLDC GLUCOMTR-MCNC: 160 MG/DL — HIGH (ref 70–99)
GLUCOSE BLDC GLUCOMTR-MCNC: 203 MG/DL — HIGH (ref 70–99)
GLUCOSE BLDC GLUCOMTR-MCNC: 223 MG/DL — HIGH (ref 70–99)
LEVETIRACETAM SERPL-MCNC: 14.4 MCG/ML — SIGNIFICANT CHANGE UP (ref 12–46)

## 2019-03-02 PROCEDURE — 74018 RADEX ABDOMEN 1 VIEW: CPT | Mod: 26

## 2019-03-02 RX ADMIN — DIVALPROEX SODIUM 500 MILLIGRAM(S): 500 TABLET, DELAYED RELEASE ORAL at 21:15

## 2019-03-02 RX ADMIN — TAMSULOSIN HYDROCHLORIDE 0.4 MILLIGRAM(S): 0.4 CAPSULE ORAL at 06:07

## 2019-03-02 RX ADMIN — PANTOPRAZOLE SODIUM 40 MILLIGRAM(S): 20 TABLET, DELAYED RELEASE ORAL at 06:07

## 2019-03-02 RX ADMIN — CARBIDOPA AND LEVODOPA 2 TABLET(S): 25; 100 TABLET ORAL at 06:11

## 2019-03-02 RX ADMIN — Medication 1000 UNIT(S): at 13:25

## 2019-03-02 RX ADMIN — POLYETHYLENE GLYCOL 3350 17 GRAM(S): 17 POWDER, FOR SOLUTION ORAL at 13:25

## 2019-03-02 RX ADMIN — LEVETIRACETAM 250 MILLIGRAM(S): 250 TABLET, FILM COATED ORAL at 17:18

## 2019-03-02 RX ADMIN — DEXTROSE MONOHYDRATE, SODIUM CHLORIDE, AND POTASSIUM CHLORIDE 100 MILLILITER(S): 50; .745; 4.5 INJECTION, SOLUTION INTRAVENOUS at 00:51

## 2019-03-02 RX ADMIN — ARIPIPRAZOLE 30 MILLIGRAM(S): 15 TABLET ORAL at 13:25

## 2019-03-02 RX ADMIN — Medication 2: at 17:17

## 2019-03-02 RX ADMIN — CARBIDOPA AND LEVODOPA 2 TABLET(S): 25; 100 TABLET ORAL at 21:17

## 2019-03-02 RX ADMIN — DEXTROSE MONOHYDRATE, SODIUM CHLORIDE, AND POTASSIUM CHLORIDE 100 MILLILITER(S): 50; .745; 4.5 INJECTION, SOLUTION INTRAVENOUS at 13:57

## 2019-03-02 RX ADMIN — CARBIDOPA AND LEVODOPA 1 TABLET(S): 25; 100 TABLET ORAL at 17:25

## 2019-03-02 RX ADMIN — KETOCONAZOLE 1 APPLICATION(S): 20 AEROSOL, FOAM TOPICAL at 19:44

## 2019-03-02 RX ADMIN — Medication 4: at 13:24

## 2019-03-02 RX ADMIN — TAMSULOSIN HYDROCHLORIDE 0.4 MILLIGRAM(S): 0.4 CAPSULE ORAL at 17:18

## 2019-03-02 RX ADMIN — ESCITALOPRAM OXALATE 40 MILLIGRAM(S): 10 TABLET, FILM COATED ORAL at 21:15

## 2019-03-02 RX ADMIN — Medication 4: at 23:58

## 2019-03-02 RX ADMIN — Medication 10 MILLIGRAM(S): at 21:16

## 2019-03-02 RX ADMIN — LEVETIRACETAM 250 MILLIGRAM(S): 250 TABLET, FILM COATED ORAL at 06:07

## 2019-03-02 RX ADMIN — SODIUM CHLORIDE 1 GRAM(S): 9 INJECTION INTRAMUSCULAR; INTRAVENOUS; SUBCUTANEOUS at 13:25

## 2019-03-02 NOTE — PROGRESS NOTE ADULT - SUBJECTIVE AND OBJECTIVE BOX
70 y/o M with PMH of  Mental Retardation, Parkinson's disease, HTN, seizure disorder, NIDDM, SIADH,  BPH, osteoporosis, GERD,  gait abnormality was brought to ED from Group home due to vomiting coffee  grounds.  Pt is unable to provide history due to MR. As per aid from group home, Pt start vomiting at 1 am, large amount first time, also had 2 more episodes with small amount but looked the same. Pt was in his usual  health until then.  Aid also mentioned that Pt  is less ambulatory due to Parkinson shaking, uses wheelchair  or  rolling walker with assistance.   Pt reports " I feel better now", denies Nausea oe abdominal pain. Denies other complains.   In ED: BP  stable, mildly tachycardic,  labs done H/H stable, lactate elevated.  Had  another episode of vomiting in ED. Pt was given 2.4L of  NS Bolus. Was evaluated by Intensivist no ICU admission needed at this time          03/01/19: Patient seen and examined. Complaining of constipation.   03/02/19: Patient seen and examined. S/P manual disimpaction yesterday, moved bowel today.     Vital Signs Last 24 Hrs  T(C): 36.6 (02 Mar 2019 11:33), Max: 37.1 (02 Mar 2019 05:40)  T(F): 97.9 (02 Mar 2019 11:33), Max: 98.8 (02 Mar 2019 05:40)  HR: 83 (02 Mar 2019 11:33) (79 - 97)  BP: 120/74 (02 Mar 2019 11:33) (112/70 - 123/75)  BP(mean): --  RR: 16 (02 Mar 2019 11:33) (16 - 17)  SpO2: 97% (02 Mar 2019 11:33) (96% - 97%)        	PHYSICAL EXAM:    	General: Well developed; well nourished; in no acute distress  	Eyes: PERRLA, EOMI; conjunctiva and sclera clear  	Head: Normocephalic; atraumatic  	ENMT: No nasal discharge; airway clear  	Neck: Supple; non tender; no masses  	Respiratory: No wheezes, rales or rhonchi  	Cardiovascular: Regular rate and rhythm. S1 and S2 Normal; No murmurs, gallops or rubs  	Gastrointestinal: Soft non-tender non-distended; Normal bowel sounds  	Genitourinary: No costovertebral angle tenderness  	Extremities: Normal range of motion, No clubbing, cyanosis or edema  	Vascular: Peripheral pulses palpable 2+ bilaterally  	Neurological: Alert and oriented x4  	Skin: Warm and dry. No acute rash  	Lymph Nodes: No acute cervical adenopathy  	Musculoskeletal: Normal gait, tone, without deformities  Psychiatric: Cooperative and appropriate.            Labs:                                  11.7   6.77  )-----------( 164      ( 01 Mar 2019 07:36 )             34.5     01 Mar 2019 07:36    147    |  110    |  31     ----------------------------<  172    4.1     |  28     |  0.73     Ca    8.7        01 Mar 2019 07:36          CAPILLARY BLOOD GLUCOSE      POCT Blood Glucose.: 223 mg/dL (02 Mar 2019 12:55)  POCT Blood Glucose.: 114 mg/dL (02 Mar 2019 05:44)  POCT Blood Glucose.: 155 mg/dL (01 Mar 2019 23:20)  POCT Blood Glucose.: 152 mg/dL (01 Mar 2019 18:13)                               MEDICATIONS:    ARIPiprazole 30 milliGRAM(s) Oral daily  carbidopa/levodopa  25/100 1 Tablet(s) Oral <User Schedule>  carbidopa/levodopa  25/100 2 Tablet(s) Oral <User Schedule>  cholecalciferol 1000 Unit(s) Oral daily  dextrose 5%. 1000 milliLiter(s) (50 mL/Hr) IV Continuous <Continuous>  dextrose 50% Injectable 12.5 Gram(s) IV Push once  dextrose 50% Injectable 25 Gram(s) IV Push once  dextrose 50% Injectable 25 Gram(s) IV Push once  diVALproex  milliGRAM(s) Oral at bedtime  escitalopram 40 milliGRAM(s) Oral at bedtime  insulin lispro (HumaLOG) corrective regimen sliding scale   SubCutaneous every 6 hours  ketoconazole 2% Shampoo 1 Application(s) Topical <User Schedule>  levETIRAcetam 250 milliGRAM(s) Oral two times a day  mineral oil enema 133 milliLiter(s) Rectal four times a day  oxybutynin 10 milliGRAM(s) Oral at bedtime  pantoprazole    Tablet 40 milliGRAM(s) Oral before breakfast  polyethylene glycol 3350 17 Gram(s) Oral daily  sodium chloride 1 Gram(s) Oral daily  sodium chloride 0.9% with potassium chloride 20 mEq/L 1000 milliLiter(s) (100 mL/Hr) IV Continuous <Continuous>  tamsulosin 0.4 milliGRAM(s) Oral two times a day    MEDICATIONS  (PRN):  acetaminophen   Tablet .. 650 milliGRAM(s) Oral every 6 hours PRN Temp greater or equal to 38C (100.4F), Mild Pain (1 - 3)  dextrose 40% Gel 15 Gram(s) Oral once PRN Blood Glucose LESS THAN 70 milliGRAM(s)/deciliter  docusate sodium 100 milliGRAM(s) Oral two times a day PRN Constipation  glucagon  Injectable 1 milliGRAM(s) IntraMuscular once PRN Glucose LESS THAN 70 milligrams/deciliter  magnesium citrate Oral Solution 1 Bottle Oral daily PRN Constipation  ondansetron Injectable 4 milliGRAM(s) IV Push every 6 hours PRN Nausea  senna 2 Tablet(s) Oral at bedtime PRN Constipation  zinc oxide 20% Ointment 1 Application(s) Topical four times a day PRN diaper change          Assessment and Plan:   Assessment:  · Assessment		  70 y/o M with PMH of  Mental Retardation, Parkinson's disease, HTN, seizure disorder, NIDDM, SIADH,  BPH, osteoporosis, GERD,  gait abnormality admitted for:     1. Vomiting, resolved  No signs of bleeding  Constipation  Dr Shields consult appreciated  S/P manual disimpaction    2. Lactic acidosis, possibly due to dehydration  Resolved   also on metformin, hold  No signs of infection, monitor for fevers   S/p IVF bolus  Repeat Lactate 1.9       3.  Parkinson Dz  stable  C/w Po meds   PT eval      4. DM  Type II with Hyperglycemia  Hold PO meds  On ISS    5. SIADH  NA stable  C/w NACL 1g daily       5. Seizure Disorder  C/w home meds: On Keppra and Depakote        6. MR, behavioral problems   C/w Home meds: Aripiprazole       7. DVT PPxs: Venodynes

## 2019-03-02 NOTE — PROGRESS NOTE ADULT - SUBJECTIVE AND OBJECTIVE BOX
Patient is a 69y old  Male who presents with a chief complaint of vomiting (01 Mar 2019 16:35)      Subective:  No complaints- comfrotable  Family opts for hospice.    PAST MEDICAL & SURGICAL HISTORY:  SIADH (syndrome of inappropriate ADH production)  Diabetes  Developmental disability  Hypertension  Seizures  Parkinson disease  H/O foot surgery      MEDICATIONS  (STANDING):  ARIPiprazole 30 milliGRAM(s) Oral daily  carbidopa/levodopa  25/100 1 Tablet(s) Oral <User Schedule>  carbidopa/levodopa  25/100 2 Tablet(s) Oral <User Schedule>  cholecalciferol 1000 Unit(s) Oral daily  dextrose 5%. 1000 milliLiter(s) (50 mL/Hr) IV Continuous <Continuous>  dextrose 50% Injectable 12.5 Gram(s) IV Push once  dextrose 50% Injectable 25 Gram(s) IV Push once  dextrose 50% Injectable 25 Gram(s) IV Push once  diVALproex  milliGRAM(s) Oral at bedtime  escitalopram 40 milliGRAM(s) Oral at bedtime  insulin lispro (HumaLOG) corrective regimen sliding scale   SubCutaneous every 6 hours  ketoconazole 2% Shampoo 1 Application(s) Topical <User Schedule>  levETIRAcetam 250 milliGRAM(s) Oral two times a day  oxybutynin 10 milliGRAM(s) Oral at bedtime  pantoprazole    Tablet 40 milliGRAM(s) Oral before breakfast  polyethylene glycol 3350 17 Gram(s) Oral daily  sodium chloride 1 Gram(s) Oral daily  sodium chloride 0.9% with potassium chloride 20 mEq/L 1000 milliLiter(s) (100 mL/Hr) IV Continuous <Continuous>  tamsulosin 0.4 milliGRAM(s) Oral two times a day    MEDICATIONS  (PRN):  acetaminophen   Tablet .. 650 milliGRAM(s) Oral every 6 hours PRN Temp greater or equal to 38C (100.4F), Mild Pain (1 - 3)  dextrose 40% Gel 15 Gram(s) Oral once PRN Blood Glucose LESS THAN 70 milliGRAM(s)/deciliter  docusate sodium 100 milliGRAM(s) Oral two times a day PRN Constipation  glucagon  Injectable 1 milliGRAM(s) IntraMuscular once PRN Glucose LESS THAN 70 milligrams/deciliter  magnesium citrate Oral Solution 1 Bottle Oral daily PRN Constipation  ondansetron Injectable 4 milliGRAM(s) IV Push every 6 hours PRN Nausea  senna 2 Tablet(s) Oral at bedtime PRN Constipation  zinc oxide 20% Ointment 1 Application(s) Topical four times a day PRN diaper change      REVIEW OF SYSTEMS:    RESPIRATORY: No shortness of breath  CARDIOVASCULAR: No chest pain  All other review of systems is negative unless indicated above.    Vital Signs Last 24 Hrs  T(C): 36.6 (02 Mar 2019 11:33), Max: 37.1 (02 Mar 2019 05:40)  T(F): 97.9 (02 Mar 2019 11:33), Max: 98.8 (02 Mar 2019 05:40)  HR: 83 (02 Mar 2019 11:33) (79 - 97)  BP: 120/74 (02 Mar 2019 11:33) (112/70 - 123/75)  BP(mean): --  RR: 16 (02 Mar 2019 11:33) (16 - 17)  SpO2: 97% (02 Mar 2019 11:33) (96% - 97%)    PHYSICAL EXAM:    Constitutional: NAD, well-developed  Respiratory: CTAB  Cardiovascular: S1 and S2, RRR  Gastrointestinal: BS+, soft, NT/ND  Extremities: No peripheral edema      LABS:                        11.7   6.77  )-----------( 164      ( 01 Mar 2019 07:36 )             34.5     03-01    147<H>  |  110<H>  |  31<H>  ----------------------------<  172<H>  4.1   |  28  |  0.73    Ca    8.7      01 Mar 2019 07:36            RADIOLOGY & ADDITIONAL STUDIES:

## 2019-03-02 NOTE — PROGRESS NOTE ADULT - ASSESSMENT
Imp:  Fecal impaction    Rec:  Manual disimpaction performed  Massive amounts of stool removed  Cont laxative and enema regimen
Imp:  Fecal impaction, improved    Rec:  Cont laxatives.  Check abdominal x-ray
Imp:  Persistent small bowel bleeding    Rec:  Family opted for hospice  Will follow up prn
70yo M with UGI bleeding - likely recurrence of reflux esophagitis which was diagnosed by EGD/Dr Shields in 9/2018  Mild tachycardia - likely volume depleted/dehydrated but no evidence of significant blood loss.  Respiratory function reasonable.    Plan at this time should include admission for observation,  and also:  serial H/H  Protonix infusion  GI eval +/- endoscopy as appropriate  NPO - except meds   IVF bolus and repeat lactate level  HOB 30  Antiemetics as needed (avoid agents that might exacerbate parkinson's disease)  Supportive care

## 2019-03-02 NOTE — PROGRESS NOTE ADULT - SUBJECTIVE AND OBJECTIVE BOX
Patient is a 69y old  Male who presents with a chief complaint of vomiting (02 Mar 2019 14:33)      Subective:  +BM today. No complaints.    PAST MEDICAL & SURGICAL HISTORY:  SIADH (syndrome of inappropriate ADH production)  Diabetes  Developmental disability  Hypertension  Seizures  Parkinson disease  H/O foot surgery      MEDICATIONS  (STANDING):  ARIPiprazole 30 milliGRAM(s) Oral daily  carbidopa/levodopa  25/100 1 Tablet(s) Oral <User Schedule>  carbidopa/levodopa  25/100 2 Tablet(s) Oral <User Schedule>  cholecalciferol 1000 Unit(s) Oral daily  dextrose 5%. 1000 milliLiter(s) (50 mL/Hr) IV Continuous <Continuous>  dextrose 50% Injectable 12.5 Gram(s) IV Push once  dextrose 50% Injectable 25 Gram(s) IV Push once  dextrose 50% Injectable 25 Gram(s) IV Push once  diVALproex  milliGRAM(s) Oral at bedtime  escitalopram 40 milliGRAM(s) Oral at bedtime  insulin lispro (HumaLOG) corrective regimen sliding scale   SubCutaneous every 6 hours  ketoconazole 2% Shampoo 1 Application(s) Topical <User Schedule>  levETIRAcetam 250 milliGRAM(s) Oral two times a day  oxybutynin 10 milliGRAM(s) Oral at bedtime  pantoprazole    Tablet 40 milliGRAM(s) Oral before breakfast  polyethylene glycol 3350 17 Gram(s) Oral daily  sodium chloride 1 Gram(s) Oral daily  sodium chloride 0.9% with potassium chloride 20 mEq/L 1000 milliLiter(s) (100 mL/Hr) IV Continuous <Continuous>  tamsulosin 0.4 milliGRAM(s) Oral two times a day    MEDICATIONS  (PRN):  acetaminophen   Tablet .. 650 milliGRAM(s) Oral every 6 hours PRN Temp greater or equal to 38C (100.4F), Mild Pain (1 - 3)  dextrose 40% Gel 15 Gram(s) Oral once PRN Blood Glucose LESS THAN 70 milliGRAM(s)/deciliter  docusate sodium 100 milliGRAM(s) Oral two times a day PRN Constipation  glucagon  Injectable 1 milliGRAM(s) IntraMuscular once PRN Glucose LESS THAN 70 milligrams/deciliter  magnesium citrate Oral Solution 1 Bottle Oral daily PRN Constipation  ondansetron Injectable 4 milliGRAM(s) IV Push every 6 hours PRN Nausea  senna 2 Tablet(s) Oral at bedtime PRN Constipation  zinc oxide 20% Ointment 1 Application(s) Topical four times a day PRN diaper change      REVIEW OF SYSTEMS:    RESPIRATORY: No shortness of breath  CARDIOVASCULAR: No chest pain  All other review of systems is negative unless indicated above.    Vital Signs Last 24 Hrs  T(C): 36.6 (02 Mar 2019 11:33), Max: 37.1 (02 Mar 2019 05:40)  T(F): 97.9 (02 Mar 2019 11:33), Max: 98.8 (02 Mar 2019 05:40)  HR: 83 (02 Mar 2019 11:33) (79 - 97)  BP: 120/74 (02 Mar 2019 11:33) (112/70 - 123/75)  BP(mean): --  RR: 16 (02 Mar 2019 11:33) (16 - 17)  SpO2: 97% (02 Mar 2019 11:33) (96% - 97%)    PHYSICAL EXAM:    Constitutional: NAD, well-developed  Respiratory: CTAB  Cardiovascular: S1 and S2, RRR  Gastrointestinal: BS+, soft, mildly distended  Extremities: No peripheral edema  Psychiatric: Normal mood, normal affect    LABS:                        11.7   6.77  )-----------( 164      ( 01 Mar 2019 07:36 )             34.5     03-01    147<H>  |  110<H>  |  31<H>  ----------------------------<  172<H>  4.1   |  28  |  0.73    Ca    8.7      01 Mar 2019 07:36            RADIOLOGY & ADDITIONAL STUDIES:

## 2019-03-03 LAB
GLUCOSE BLDC GLUCOMTR-MCNC: 101 MG/DL — HIGH (ref 70–99)
GLUCOSE BLDC GLUCOMTR-MCNC: 209 MG/DL — HIGH (ref 70–99)
GLUCOSE BLDC GLUCOMTR-MCNC: 224 MG/DL — HIGH (ref 70–99)
GLUCOSE BLDC GLUCOMTR-MCNC: 236 MG/DL — HIGH (ref 70–99)

## 2019-03-03 RX ORDER — INSULIN LISPRO 100/ML
VIAL (ML) SUBCUTANEOUS
Qty: 0 | Refills: 0 | Status: DISCONTINUED | OUTPATIENT
Start: 2019-03-03 | End: 2019-03-04

## 2019-03-03 RX ADMIN — ARIPIPRAZOLE 30 MILLIGRAM(S): 15 TABLET ORAL at 11:33

## 2019-03-03 RX ADMIN — PANTOPRAZOLE SODIUM 40 MILLIGRAM(S): 20 TABLET, DELAYED RELEASE ORAL at 06:16

## 2019-03-03 RX ADMIN — TAMSULOSIN HYDROCHLORIDE 0.4 MILLIGRAM(S): 0.4 CAPSULE ORAL at 16:22

## 2019-03-03 RX ADMIN — Medication 10 MILLIGRAM(S): at 21:09

## 2019-03-03 RX ADMIN — Medication 1000 UNIT(S): at 11:41

## 2019-03-03 RX ADMIN — LEVETIRACETAM 250 MILLIGRAM(S): 250 TABLET, FILM COATED ORAL at 16:21

## 2019-03-03 RX ADMIN — CARBIDOPA AND LEVODOPA 1 TABLET(S): 25; 100 TABLET ORAL at 16:30

## 2019-03-03 RX ADMIN — POLYETHYLENE GLYCOL 3350 17 GRAM(S): 17 POWDER, FOR SOLUTION ORAL at 11:33

## 2019-03-03 RX ADMIN — TAMSULOSIN HYDROCHLORIDE 0.4 MILLIGRAM(S): 0.4 CAPSULE ORAL at 06:15

## 2019-03-03 RX ADMIN — CARBIDOPA AND LEVODOPA 2 TABLET(S): 25; 100 TABLET ORAL at 21:09

## 2019-03-03 RX ADMIN — ESCITALOPRAM OXALATE 40 MILLIGRAM(S): 10 TABLET, FILM COATED ORAL at 21:06

## 2019-03-03 RX ADMIN — Medication 4: at 12:46

## 2019-03-03 RX ADMIN — SODIUM CHLORIDE 1 GRAM(S): 9 INJECTION INTRAMUSCULAR; INTRAVENOUS; SUBCUTANEOUS at 11:33

## 2019-03-03 RX ADMIN — DEXTROSE MONOHYDRATE, SODIUM CHLORIDE, AND POTASSIUM CHLORIDE 100 MILLILITER(S): 50; .745; 4.5 INJECTION, SOLUTION INTRAVENOUS at 14:49

## 2019-03-03 RX ADMIN — Medication 4: at 16:43

## 2019-03-03 RX ADMIN — DIVALPROEX SODIUM 500 MILLIGRAM(S): 500 TABLET, DELAYED RELEASE ORAL at 21:06

## 2019-03-03 RX ADMIN — Medication 4: at 21:16

## 2019-03-03 RX ADMIN — LEVETIRACETAM 250 MILLIGRAM(S): 250 TABLET, FILM COATED ORAL at 06:16

## 2019-03-03 RX ADMIN — CARBIDOPA AND LEVODOPA 2 TABLET(S): 25; 100 TABLET ORAL at 06:16

## 2019-03-03 NOTE — PHYSICAL THERAPY INITIAL EVALUATION ADULT - PERTINENT HX OF CURRENT PROBLEM, REHAB EVAL
70 y/o M with PMH of  Mental Retardation, Parkinson's disease, HTN, seizure disorder, NIDDM, SIADH,  BPH, osteoporosis, GERD,  gait abnormality was brought to ED from Group home due to vomiting coffee  grounds.  Pt is unable to provide history due to MR. As per aid from group home, Pt start vomiting at 1 am, large amount first time, also had 2 more episodes with small amount but looked the same. Pt was in his usual  health until then.

## 2019-03-03 NOTE — PHYSICAL THERAPY INITIAL EVALUATION ADULT - ADDITIONAL COMMENTS
As per group home aid, pt only transfers and ambulates very short distances with a RW and assist of 2.

## 2019-03-03 NOTE — PROGRESS NOTE ADULT - SUBJECTIVE AND OBJECTIVE BOX
68 y/o M with PMH of  Mental Retardation, Parkinson's disease, HTN, seizure disorder, NIDDM, SIADH,  BPH, osteoporosis, GERD,  gait abnormality was brought to ED from Group home due to vomiting coffee  grounds.  Pt is unable to provide history due to MR. As per aid from group home, Pt start vomiting at 1 am, large amount first time, also had 2 more episodes with small amount but looked the same. Pt was in his usual  health until then.  Aid also mentioned that Pt  is less ambulatory due to Parkinson shaking, uses wheelchair  or  rolling walker with assistance.   Pt reports " I feel better now", denies Nausea oe abdominal pain. Denies other complains.   In ED: BP  stable, mildly tachycardic,  labs done H/H stable, lactate elevated.  Had  another episode of vomiting in ED. Pt was given 2.4L of  NS Bolus. Was evaluated by Intensivist no ICU admission needed at this time          03/01/19: Patient seen and examined. Complaining of constipation.   03/02/19: Patient seen and examined. S/P manual disimpaction yesterday, moved bowel today.   03/03/19: Patient seen and examined. He denies any abd pain nausea.     Vital Signs Last 24 Hrs  T(C): 36.2 (03 Mar 2019 11:43), Max: 36.8 (02 Mar 2019 16:24)  T(F): 97.1 (03 Mar 2019 11:43), Max: 98.3 (02 Mar 2019 16:24)  HR: 89 (03 Mar 2019 11:43) (74 - 99)  BP: 116/73 (03 Mar 2019 11:43) (116/73 - 128/64)  BP(mean): --  RR: 18 (03 Mar 2019 11:43) (16 - 18)  SpO2: 95% (03 Mar 2019 11:43) (95% - 100%)        	PHYSICAL EXAM:    	General: Well developed; well nourished; in no acute distress  	Eyes: PERRLA, EOMI; conjunctiva and sclera clear  	Head: Normocephalic; atraumatic  	ENMT: No nasal discharge; airway clear  	Neck: Supple; non tender; no masses  	Respiratory: No wheezes, rales or rhonchi  	Cardiovascular: Regular rate and rhythm. S1 and S2 Normal; No murmurs, gallops or rubs  	Gastrointestinal: Soft non-tender non-distended; Normal bowel sounds  	Genitourinary: No costovertebral angle tenderness  	Extremities: Normal range of motion, No clubbing, cyanosis or edema  	Vascular: Peripheral pulses palpable 2+ bilaterally  	Neurological: Alert and oriented x4  	Skin: Warm and dry. No acute rash  	Lymph Nodes: No acute cervical adenopathy  	Musculoskeletal: Normal gait, tone, without deformities  Psychiatric: Cooperative and appropriate.            Labs:             CAPILLARY BLOOD GLUCOSE      POCT Blood Glucose.: 224 mg/dL (03 Mar 2019 12:27)  POCT Blood Glucose.: 101 mg/dL (03 Mar 2019 05:28)  POCT Blood Glucose.: 203 mg/dL (02 Mar 2019 23:25)  POCT Blood Glucose.: 160 mg/dL (02 Mar 2019 17:14)          MEDICATIONS:    ARIPiprazole 30 milliGRAM(s) Oral daily  carbidopa/levodopa  25/100 1 Tablet(s) Oral <User Schedule>  carbidopa/levodopa  25/100 2 Tablet(s) Oral <User Schedule>  cholecalciferol 1000 Unit(s) Oral daily  dextrose 5%. 1000 milliLiter(s) (50 mL/Hr) IV Continuous <Continuous>  dextrose 50% Injectable 12.5 Gram(s) IV Push once  dextrose 50% Injectable 25 Gram(s) IV Push once  dextrose 50% Injectable 25 Gram(s) IV Push once  diVALproex  milliGRAM(s) Oral at bedtime  escitalopram 40 milliGRAM(s) Oral at bedtime  insulin lispro (HumaLOG) corrective regimen sliding scale   SubCutaneous every 6 hours  ketoconazole 2% Shampoo 1 Application(s) Topical <User Schedule>  levETIRAcetam 250 milliGRAM(s) Oral two times a day  mineral oil enema 133 milliLiter(s) Rectal four times a day  oxybutynin 10 milliGRAM(s) Oral at bedtime  pantoprazole    Tablet 40 milliGRAM(s) Oral before breakfast  polyethylene glycol 3350 17 Gram(s) Oral daily  sodium chloride 1 Gram(s) Oral daily  sodium chloride 0.9% with potassium chloride 20 mEq/L 1000 milliLiter(s) (100 mL/Hr) IV Continuous <Continuous>  tamsulosin 0.4 milliGRAM(s) Oral two times a day    MEDICATIONS  (PRN):  acetaminophen   Tablet .. 650 milliGRAM(s) Oral every 6 hours PRN Temp greater or equal to 38C (100.4F), Mild Pain (1 - 3)  dextrose 40% Gel 15 Gram(s) Oral once PRN Blood Glucose LESS THAN 70 milliGRAM(s)/deciliter  docusate sodium 100 milliGRAM(s) Oral two times a day PRN Constipation  glucagon  Injectable 1 milliGRAM(s) IntraMuscular once PRN Glucose LESS THAN 70 milligrams/deciliter  magnesium citrate Oral Solution 1 Bottle Oral daily PRN Constipation  ondansetron Injectable 4 milliGRAM(s) IV Push every 6 hours PRN Nausea  senna 2 Tablet(s) Oral at bedtime PRN Constipation  zinc oxide 20% Ointment 1 Application(s) Topical four times a day PRN diaper change          Assessment and Plan:   Assessment:  · Assessment		  68 y/o M with PMH of  Mental Retardation, Parkinson's disease, HTN, seizure disorder, NIDDM, SIADH,  BPH, osteoporosis, GERD,  gait abnormality admitted for:     1. Vomiting, resolved  No signs of bleeding  Constipation  Dr Shields consult appreciated  S/P manual disimpaction    2. Lactic acidosis, possibly due to dehydration  Resolved   also on metformin, hold  No signs of infection, monitor for fevers   S/p IVF bolus  Repeat Lactate 1.9       3.  Parkinson Dz  stable  C/w Po meds   PT eval      4. DM  Type II with Hyperglycemia  Hold PO meds  On ISS    5. SIADH  NA stable  C/w NACL 1g daily       5. Seizure Disorder  C/w home meds: On Keppra and Depakote        6. MR, behavioral problems   C/w Home meds: Aripiprazole       7. DVT PPxs: Venodynes    D/C to group home tomorrow.

## 2019-03-03 NOTE — PROGRESS NOTE ADULT - PROVIDER SPECIALTY LIST ADULT
Critical Care
Gastroenterology
Hospitalist

## 2019-03-04 ENCOUNTER — TRANSCRIPTION ENCOUNTER (OUTPATIENT)
Age: 69
End: 2019-03-04

## 2019-03-04 VITALS
RESPIRATION RATE: 18 BRPM | HEART RATE: 90 BPM | DIASTOLIC BLOOD PRESSURE: 57 MMHG | OXYGEN SATURATION: 96 % | SYSTOLIC BLOOD PRESSURE: 101 MMHG | TEMPERATURE: 99 F

## 2019-03-04 LAB
GLUCOSE BLDC GLUCOMTR-MCNC: 173 MG/DL — HIGH (ref 70–99)
GLUCOSE BLDC GLUCOMTR-MCNC: 293 MG/DL — HIGH (ref 70–99)

## 2019-03-04 RX ORDER — POLYETHYLENE GLYCOL 3350 17 G/17G
17 POWDER, FOR SOLUTION ORAL
Qty: 510 | Refills: 0 | OUTPATIENT
Start: 2019-03-04 | End: 2019-04-02

## 2019-03-04 RX ADMIN — Medication 6: at 12:32

## 2019-03-04 RX ADMIN — POLYETHYLENE GLYCOL 3350 17 GRAM(S): 17 POWDER, FOR SOLUTION ORAL at 13:03

## 2019-03-04 RX ADMIN — SODIUM CHLORIDE 1 GRAM(S): 9 INJECTION INTRAMUSCULAR; INTRAVENOUS; SUBCUTANEOUS at 13:03

## 2019-03-04 RX ADMIN — Medication 2: at 08:27

## 2019-03-04 RX ADMIN — PANTOPRAZOLE SODIUM 40 MILLIGRAM(S): 20 TABLET, DELAYED RELEASE ORAL at 06:08

## 2019-03-04 RX ADMIN — Medication 1000 UNIT(S): at 13:03

## 2019-03-04 RX ADMIN — CARBIDOPA AND LEVODOPA 2 TABLET(S): 25; 100 TABLET ORAL at 06:09

## 2019-03-04 RX ADMIN — TAMSULOSIN HYDROCHLORIDE 0.4 MILLIGRAM(S): 0.4 CAPSULE ORAL at 06:08

## 2019-03-04 RX ADMIN — ARIPIPRAZOLE 30 MILLIGRAM(S): 15 TABLET ORAL at 13:03

## 2019-03-04 RX ADMIN — LEVETIRACETAM 250 MILLIGRAM(S): 250 TABLET, FILM COATED ORAL at 06:07

## 2019-03-04 NOTE — DISCHARGE NOTE ADULT - PATIENT PORTAL LINK FT
You can access the [facility] Patient Portal, offered by [facility], by registering with the following website: [email or web address]

## 2019-03-04 NOTE — DISCHARGE NOTE ADULT - HOSPITAL COURSE
Hospital course:   70 y/o M with PMH of  Mental Retardation, Parkinson's disease, HTN, seizure disorder, NIDDM, SIADH,  BPH, osteoporosis, GERD,  gait abnormality was brought to ED from Group home due to vomiting coffee  grounds.  Pt is unable to provide history due to MR. As per aid from group home, Pt start vomiting at 1 am, large amount first time, also had 2 more episodes with small amount but looked the same. Pt was in his usual  health until then.  Aid also mentioned that Pt  is less ambulatory due to Parkinson shaking, uses wheelchair  or  rolling walker with assistance.   Pt reports " I feel better now", denies Nausea oe abdominal pain. Denies other complains.   In ED: BP  stable, mildly tachycardic,  labs done H/H stable, lactate elevated.  Had  another episode of vomiting in ED. Pt was given 2.4L of  NS Bolus. Was evaluated by Intensivist no ICU admission needed at this time      03/01/19: Patient seen and examined. Complaining of constipation.   03/02/19: Patient seen and examined. S/P manual disimpaction yesterday, moved bowel today.   03/03/19: Patient seen and examined. He denies any abd pain nausea.     Vital Signs Last 24 Hrs  T(C): 37.1 (04 Mar 2019 10:43), Max: 37.1 (04 Mar 2019 10:43)  T(F): 98.8 (04 Mar 2019 10:43), Max: 98.8 (04 Mar 2019 10:43)  HR: 90 (04 Mar 2019 10:43) (77 - 90)  BP: 101/57 (04 Mar 2019 10:43) (101/57 - 123/89)  BP(mean): --  RR: 18 (04 Mar 2019 10:43) (18 - 18)  SpO2: 96% (04 Mar 2019 10:43) (96% - 100%)      	PHYSICAL EXAM:    	General: Well developed; well nourished; in no acute distress  	Eyes: PERRLA, EOMI; conjunctiva and sclera clear  	Head: Normocephalic; atraumatic  	ENMT: No nasal discharge; airway clear  	Neck: Supple; non tender; no masses  	Respiratory: No wheezes, rales or rhonchi  	Cardiovascular: Regular rate and rhythm. S1 and S2 Normal; No murmurs, gallops or rubs  	Gastrointestinal: Soft non-tender non-distended; Normal bowel sounds  	Genitourinary: No costovertebral angle tenderness  	Extremities: Normal range of motion, No clubbing, cyanosis or edema  	Vascular: Peripheral pulses palpable 2+ bilaterally  	Neurological: Alert and oriented x4  	Skin: Warm and dry. No acute rash  	Lymph Nodes: No acute cervical adenopathy  	Musculoskeletal: Normal gait, tone, without deformities  Psychiatric: Cooperative and appropriate.      Assessment and Plan:   Assessment:  · Assessment		  70 y/o M with PMH of  Mental Retardation, Parkinson's disease, HTN, seizure disorder, NIDDM, SIADH,  BPH, osteoporosis, GERD,  gait abnormality admitted for:     1. Vomiting, resolved  No signs of bleeding  Constipation  S/P manual disimpaction  Continue colace and miralax    2. Lactic acidosis, possibly due to dehydration  Resolved   No signs of infection,   Repeat Lactate 1.9     3.  Parkinson Dz  C/w Po meds     4. DM  Type II with Hyperglycemia  On ISS, stable    5. SIADH  On NA tab, stable    5. Seizure Disorder  C/w home meds: On Keppra and Depakote      6. MR, behavioral problems   C/w Home meds: Aripiprazole     D/C today  Spent more than 30 min to prepare the discharge

## 2019-03-04 NOTE — DISCHARGE NOTE ADULT - CARE PLAN
Principal Discharge DX:	Fecal impaction in rectum  Goal:	prevent further admission  Assessment and plan of treatment:	Follow up with PMD

## 2019-03-04 NOTE — DISCHARGE NOTE ADULT - PROVIDER TOKENS
Nursing Note by Vale Shaffer CMA at 18 12:46 PM     Author:  Vale Shaffer CMA Service:  (none) Author Type:  Certified Medical Assistant     Filed:  18 12:46 PM Encounter Date:  2018 Status:  Signed     :  Vale Shaffer CMA (Certified Medical Assistant)            12:45 PM[SR1.1T]  Chief Complaint     Patient presents with     • Cough      cough, coughing up phlegm x5 days[SR1.2T]     Patient brought to exam room.  Electronically Signed by:    Vale Shaffer CMA , 2018  Patient's name,  and allergies verified with[SR1.1T] patient[SR1.1M].  Last visit with JENNIFER DELAROSA was on No match found  Last visit with [facility] was on 2017 at 12:05 PM in [facility]  Match done based on reference date of today 18  Seema Euceda was offered treatment for pain control:[SR1.1T] No.   Waiting for MD to assess.[SR1.1M]      Revision History        User Key Date/Time User Provider Type Action    > SR1.2 18 12:46 PM Vale Shaffer CMA Certified Medical Assistant Sign     SR1.1 18 12:45 PM Vale Shaffer CMA Certified Medical Assistant     M - Manual, T - Template            
FREE:[LAST:[PMD],PHONE:[(   )    -],FAX:[(   )    -]]

## 2019-03-04 NOTE — DISCHARGE NOTE ADULT - MEDICATION SUMMARY - MEDICATIONS TO TAKE
I will START or STAY ON the medications listed below when I get home from the hospital:    acetaminophen 500 mg oral tablet  -- 2 tab(s) by mouth every 8 hours, As Needed - for moderate pain  -- Indication: For fever    aspirin 81 mg oral delayed release tablet  -- 1 tab(s) by mouth once a day  -- Indication: For Home med    tamsulosin 0.4 mg oral capsule  -- 1 cap(s) by mouth 2 times a day  -- Indication: For BPH    Keppra 250 mg oral tablet  -- 1 tab(s) by mouth 2 times a day  -- Indication: For Seizures    divalproex sodium 500 mg oral tablet, extended release  -- 2 tab(s) by mouth once a day (at bedtime)  -- Indication: For Seizures    escitalopram 20 mg oral tablet  -- 2 tab(s) by mouth once a day (at bedtime)  -- Indication: For anxiety    metFORMIN 1000 mg oral tablet  -- 1 tab(s) by mouth 2 times a day (with meals)  -- Indication: For Diabetes    glimepiride 1 mg oral tablet  -- 0.5 tab(s) by mouth 2 times a day with breakfast and dinner  -- Indication: For Diabetes    loratadine 10 mg oral tablet  -- 1 tab(s) by mouth once a day  -- Indication: For Home med    carbidopa-levodopa 25 mg-100 mg oral tablet  -- 2 tab(s) by mouth 2 times a day @ 7 am and 8 pm  -- Indication: For PD    carbidopa-levodopa 25 mg-100 mg oral tablet  -- 1 tab(s) by mouth once a day at 4 pm  -- Indication: For PD    ARIPiprazole 30 mg oral tablet  -- 1 tab(s) by mouth once a day  -- Indication: For Developmental disability    ciclopirox 0.77% topical cream  -- Apply on skin to affected area 2 times a day  -- Indication: For Home med    Balmex topical ointment  -- Apply on skin to affected area [diaper area and lower cortal area], As Needed - for rash with every diaper change  -- Indication: For Home med    ketoconazole 2% topical shampoo  -- Apply on skin to affected area every other day (at bedtime)  -- Indication: For Home med    ferrous sulfate 325 mg (65 mg elemental iron) oral tablet  -- 1 tab(s) by mouth 2 times a day  -- Indication: For Supplement    docusate sodium 100 mg oral capsule  -- 2 cap(s) by mouth once a day  -- Indication: For constipation    polyethylene glycol 3350 oral powder for reconstitution  -- 17 gram(s) by mouth once a day  -- Indication: For constipation    Sodium Chloride 1000 mg oral tablet, soluble  -- 1 tab(s) by mouth once a day  -- Indication: For Supplement    pantoprazole 40 mg oral delayed release tablet  -- 1 tab(s) by mouth 2 times a day  -- Indication: For GI    oxybutynin 10 mg/24 hr oral tablet, extended release  -- 1 tab(s) by mouth once a day (at bedtime)  -- Indication: For urinary    Calcium 600+D 600 mg-200 intl units oral tablet  -- 1 tab(s) by mouth 3 times a day  -- Indication: For Supplement    cholecalciferol 1000 intl units oral tablet  -- 1 tab(s) by mouth once a day  -- Indication: For Supplement

## 2019-03-08 DIAGNOSIS — G20 PARKINSON'S DISEASE: ICD-10-CM

## 2019-03-08 DIAGNOSIS — Z79.84 LONG TERM (CURRENT) USE OF ORAL HYPOGLYCEMIC DRUGS: ICD-10-CM

## 2019-03-08 DIAGNOSIS — M81.0 AGE-RELATED OSTEOPOROSIS WITHOUT CURRENT PATHOLOGICAL FRACTURE: ICD-10-CM

## 2019-03-08 DIAGNOSIS — R14.0 ABDOMINAL DISTENSION (GASEOUS): ICD-10-CM

## 2019-03-08 DIAGNOSIS — K56.41 FECAL IMPACTION: ICD-10-CM

## 2019-03-08 DIAGNOSIS — E87.2 ACIDOSIS: ICD-10-CM

## 2019-03-08 DIAGNOSIS — K21.0 GASTRO-ESOPHAGEAL REFLUX DISEASE WITH ESOPHAGITIS: ICD-10-CM

## 2019-03-08 DIAGNOSIS — F79 UNSPECIFIED INTELLECTUAL DISABILITIES: ICD-10-CM

## 2019-03-08 DIAGNOSIS — K92.2 GASTROINTESTINAL HEMORRHAGE, UNSPECIFIED: ICD-10-CM

## 2019-03-08 DIAGNOSIS — F02.80 DEMENTIA IN OTHER DISEASES CLASSIFIED ELSEWHERE, UNSPECIFIED SEVERITY, WITHOUT BEHAVIORAL DISTURBANCE, PSYCHOTIC DISTURBANCE, MOOD DISTURBANCE, AND ANXIETY: ICD-10-CM

## 2019-03-08 DIAGNOSIS — F69 UNSPECIFIED DISORDER OF ADULT PERSONALITY AND BEHAVIOR: ICD-10-CM

## 2019-03-08 DIAGNOSIS — R11.10 VOMITING, UNSPECIFIED: ICD-10-CM

## 2019-03-08 DIAGNOSIS — Z79.82 LONG TERM (CURRENT) USE OF ASPIRIN: ICD-10-CM

## 2019-03-08 DIAGNOSIS — N40.0 BENIGN PROSTATIC HYPERPLASIA WITHOUT LOWER URINARY TRACT SYMPTOMS: ICD-10-CM

## 2019-03-08 DIAGNOSIS — E22.2 SYNDROME OF INAPPROPRIATE SECRETION OF ANTIDIURETIC HORMONE: ICD-10-CM

## 2019-03-08 DIAGNOSIS — R26.9 UNSPECIFIED ABNORMALITIES OF GAIT AND MOBILITY: ICD-10-CM

## 2019-03-08 DIAGNOSIS — I10 ESSENTIAL (PRIMARY) HYPERTENSION: ICD-10-CM

## 2019-03-08 DIAGNOSIS — Z79.2 LONG TERM (CURRENT) USE OF ANTIBIOTICS: ICD-10-CM

## 2019-03-08 DIAGNOSIS — E11.65 TYPE 2 DIABETES MELLITUS WITH HYPERGLYCEMIA: ICD-10-CM

## 2019-03-08 DIAGNOSIS — G40.909 EPILEPSY, UNSPECIFIED, NOT INTRACTABLE, WITHOUT STATUS EPILEPTICUS: ICD-10-CM

## 2019-03-08 DIAGNOSIS — E86.0 DEHYDRATION: ICD-10-CM

## 2019-03-08 DIAGNOSIS — R62.50 UNSPECIFIED LACK OF EXPECTED NORMAL PHYSIOLOGICAL DEVELOPMENT IN CHILDHOOD: ICD-10-CM

## 2019-04-17 ENCOUNTER — INPATIENT (INPATIENT)
Facility: HOSPITAL | Age: 69
LOS: 5 days | Discharge: HOME | End: 2019-04-23
Attending: FAMILY MEDICINE | Admitting: FAMILY MEDICINE
Payer: MEDICARE

## 2019-04-17 VITALS
OXYGEN SATURATION: 97 % | RESPIRATION RATE: 18 BRPM | SYSTOLIC BLOOD PRESSURE: 120 MMHG | WEIGHT: 177.91 LBS | DIASTOLIC BLOOD PRESSURE: 73 MMHG | HEART RATE: 87 BPM | TEMPERATURE: 98 F

## 2019-04-17 DIAGNOSIS — Z98.890 OTHER SPECIFIED POSTPROCEDURAL STATES: Chronic | ICD-10-CM

## 2019-04-17 PROBLEM — E11.9 TYPE 2 DIABETES MELLITUS WITHOUT COMPLICATIONS: Chronic | Status: ACTIVE | Noted: 2019-02-28

## 2019-04-17 PROBLEM — E22.2 SYNDROME OF INAPPROPRIATE SECRETION OF ANTIDIURETIC HORMONE: Chronic | Status: ACTIVE | Noted: 2019-02-28

## 2019-04-17 PROBLEM — F89 UNSPECIFIED DISORDER OF PSYCHOLOGICAL DEVELOPMENT: Chronic | Status: ACTIVE | Noted: 2019-02-28

## 2019-04-17 LAB
ACETONE SERPL-MCNC: ABNORMAL
ALBUMIN SERPL ELPH-MCNC: 3.1 G/DL — LOW (ref 3.3–5)
ALP SERPL-CCNC: 70 U/L — SIGNIFICANT CHANGE UP (ref 40–120)
ALT FLD-CCNC: <6 U/L — LOW (ref 12–78)
AMMONIA BLD-MCNC: 14 UMOL/L — SIGNIFICANT CHANGE UP (ref 11–32)
ANION GAP SERPL CALC-SCNC: 15 MMOL/L — SIGNIFICANT CHANGE UP (ref 5–17)
ANION GAP SERPL CALC-SCNC: 4 MMOL/L — LOW (ref 5–17)
ANION GAP SERPL CALC-SCNC: 6 MMOL/L — SIGNIFICANT CHANGE UP (ref 5–17)
APPEARANCE UR: CLEAR — SIGNIFICANT CHANGE UP
APTT BLD: 33.3 SEC — SIGNIFICANT CHANGE UP (ref 27.5–36.3)
AST SERPL-CCNC: <3 U/L — LOW (ref 15–37)
B-OH-BUTYR SERPL-SCNC: 4.7 MMOL/L — HIGH
BACTERIA # UR AUTO: ABNORMAL
BASE EXCESS BLDV CALC-SCNC: -4.8 MMOL/L — LOW (ref -2–2)
BASOPHILS # BLD AUTO: 0.02 K/UL — SIGNIFICANT CHANGE UP (ref 0–0.2)
BASOPHILS NFR BLD AUTO: 0.2 % — SIGNIFICANT CHANGE UP (ref 0–2)
BILIRUB SERPL-MCNC: 0.4 MG/DL — SIGNIFICANT CHANGE UP (ref 0.2–1.2)
BILIRUB UR-MCNC: NEGATIVE — SIGNIFICANT CHANGE UP
BUN SERPL-MCNC: 28 MG/DL — HIGH (ref 7–23)
BUN SERPL-MCNC: 29 MG/DL — HIGH (ref 7–23)
BUN SERPL-MCNC: 33 MG/DL — HIGH (ref 7–23)
CALCIUM SERPL-MCNC: 8 MG/DL — LOW (ref 8.5–10.1)
CALCIUM SERPL-MCNC: 8.2 MG/DL — LOW (ref 8.5–10.1)
CALCIUM SERPL-MCNC: 9.3 MG/DL — SIGNIFICANT CHANGE UP (ref 8.5–10.1)
CHLORIDE SERPL-SCNC: 116 MMOL/L — HIGH (ref 96–108)
CHLORIDE SERPL-SCNC: 126 MMOL/L — HIGH (ref 96–108)
CHLORIDE SERPL-SCNC: 128 MMOL/L — HIGH (ref 96–108)
CK SERPL-CCNC: 44 U/L — SIGNIFICANT CHANGE UP (ref 26–308)
CO2 SERPL-SCNC: 21 MMOL/L — LOW (ref 22–31)
CO2 SERPL-SCNC: 24 MMOL/L — SIGNIFICANT CHANGE UP (ref 22–31)
CO2 SERPL-SCNC: 26 MMOL/L — SIGNIFICANT CHANGE UP (ref 22–31)
COLOR SPEC: YELLOW — SIGNIFICANT CHANGE UP
COMMENT - URINE: SIGNIFICANT CHANGE UP
CREAT SERPL-MCNC: 0.9 MG/DL — SIGNIFICANT CHANGE UP (ref 0.5–1.3)
CREAT SERPL-MCNC: 1.03 MG/DL — SIGNIFICANT CHANGE UP (ref 0.5–1.3)
CREAT SERPL-MCNC: 1.35 MG/DL — HIGH (ref 0.5–1.3)
DIFF PNL FLD: NEGATIVE — SIGNIFICANT CHANGE UP
EOSINOPHIL # BLD AUTO: 0 K/UL — SIGNIFICANT CHANGE UP (ref 0–0.5)
EOSINOPHIL NFR BLD AUTO: 0 % — SIGNIFICANT CHANGE UP (ref 0–6)
EPI CELLS # UR: SIGNIFICANT CHANGE UP
GLUCOSE BLDC GLUCOMTR-MCNC: 200 MG/DL — HIGH (ref 70–99)
GLUCOSE BLDC GLUCOMTR-MCNC: 231 MG/DL — HIGH (ref 70–99)
GLUCOSE BLDC GLUCOMTR-MCNC: 243 MG/DL — HIGH (ref 70–99)
GLUCOSE BLDC GLUCOMTR-MCNC: 333 MG/DL — HIGH (ref 70–99)
GLUCOSE BLDC GLUCOMTR-MCNC: 473 MG/DL — CRITICAL HIGH (ref 70–99)
GLUCOSE BLDC GLUCOMTR-MCNC: 489 MG/DL — CRITICAL HIGH (ref 70–99)
GLUCOSE SERPL-MCNC: 189 MG/DL — HIGH (ref 70–99)
GLUCOSE SERPL-MCNC: 235 MG/DL — HIGH (ref 70–99)
GLUCOSE SERPL-MCNC: 576 MG/DL — CRITICAL HIGH (ref 70–99)
GLUCOSE UR QL: 1000 MG/DL
HCO3 BLDV-SCNC: 21 MMOL/L — SIGNIFICANT CHANGE UP (ref 21–29)
HCT VFR BLD CALC: 40 % — SIGNIFICANT CHANGE UP (ref 39–50)
HGB BLD-MCNC: 13 G/DL — SIGNIFICANT CHANGE UP (ref 13–17)
IMM GRANULOCYTES NFR BLD AUTO: 0.6 % — SIGNIFICANT CHANGE UP (ref 0–1.5)
INR BLD: 1.07 RATIO — SIGNIFICANT CHANGE UP (ref 0.88–1.16)
KETONES UR-MCNC: ABNORMAL
LACTATE SERPL-SCNC: 1.6 MMOL/L — SIGNIFICANT CHANGE UP (ref 0.7–2)
LACTATE SERPL-SCNC: 2.2 MMOL/L — HIGH (ref 0.7–2)
LACTATE SERPL-SCNC: 3.7 MMOL/L — HIGH (ref 0.7–2)
LACTATE SERPL-SCNC: 5.1 MMOL/L — CRITICAL HIGH (ref 0.7–2)
LEUKOCYTE ESTERASE UR-ACNC: ABNORMAL
LIDOCAIN IGE QN: 36 U/L — LOW (ref 73–393)
LYMPHOCYTES # BLD AUTO: 1.51 K/UL — SIGNIFICANT CHANGE UP (ref 1–3.3)
LYMPHOCYTES # BLD AUTO: 17 % — SIGNIFICANT CHANGE UP (ref 13–44)
MAGNESIUM SERPL-MCNC: 2.2 MG/DL — SIGNIFICANT CHANGE UP (ref 1.6–2.6)
MCHC RBC-ENTMCNC: 31.2 PG — SIGNIFICANT CHANGE UP (ref 27–34)
MCHC RBC-ENTMCNC: 32.5 GM/DL — SIGNIFICANT CHANGE UP (ref 32–36)
MCV RBC AUTO: 95.9 FL — SIGNIFICANT CHANGE UP (ref 80–100)
MONOCYTES # BLD AUTO: 0.59 K/UL — SIGNIFICANT CHANGE UP (ref 0–0.9)
MONOCYTES NFR BLD AUTO: 6.6 % — SIGNIFICANT CHANGE UP (ref 2–14)
NEUTROPHILS # BLD AUTO: 6.72 K/UL — SIGNIFICANT CHANGE UP (ref 1.8–7.4)
NEUTROPHILS NFR BLD AUTO: 75.6 % — SIGNIFICANT CHANGE UP (ref 43–77)
NITRITE UR-MCNC: NEGATIVE — SIGNIFICANT CHANGE UP
NRBC # BLD: 0 /100 WBCS — SIGNIFICANT CHANGE UP (ref 0–0)
PCO2 BLDV: 42 MMHG — SIGNIFICANT CHANGE UP (ref 35–50)
PH BLDV: 7.32 — LOW (ref 7.35–7.45)
PH UR: 5 — SIGNIFICANT CHANGE UP (ref 5–8)
PLATELET # BLD AUTO: 142 K/UL — LOW (ref 150–400)
PO2 BLDV: 34 MMHG — SIGNIFICANT CHANGE UP (ref 25–45)
POTASSIUM SERPL-MCNC: 3.5 MMOL/L — SIGNIFICANT CHANGE UP (ref 3.5–5.3)
POTASSIUM SERPL-MCNC: 3.6 MMOL/L — SIGNIFICANT CHANGE UP (ref 3.5–5.3)
POTASSIUM SERPL-MCNC: 4.2 MMOL/L — SIGNIFICANT CHANGE UP (ref 3.5–5.3)
POTASSIUM SERPL-SCNC: 3.5 MMOL/L — SIGNIFICANT CHANGE UP (ref 3.5–5.3)
POTASSIUM SERPL-SCNC: 3.6 MMOL/L — SIGNIFICANT CHANGE UP (ref 3.5–5.3)
POTASSIUM SERPL-SCNC: 4.2 MMOL/L — SIGNIFICANT CHANGE UP (ref 3.5–5.3)
PROT SERPL-MCNC: 6 GM/DL — SIGNIFICANT CHANGE UP (ref 6–8.3)
PROT UR-MCNC: NEGATIVE MG/DL — SIGNIFICANT CHANGE UP
PROTHROM AB SERPL-ACNC: 11.9 SEC — SIGNIFICANT CHANGE UP (ref 10–12.9)
RBC # BLD: 4.17 M/UL — LOW (ref 4.2–5.8)
RBC # FLD: 12.9 % — SIGNIFICANT CHANGE UP (ref 10.3–14.5)
RBC CASTS # UR COMP ASSIST: SIGNIFICANT CHANGE UP /HPF (ref 0–4)
SAO2 % BLDV: 48 % — LOW (ref 67–88)
SODIUM SERPL-SCNC: 152 MMOL/L — HIGH (ref 135–145)
SODIUM SERPL-SCNC: 156 MMOL/L — HIGH (ref 135–145)
SODIUM SERPL-SCNC: 158 MMOL/L — HIGH (ref 135–145)
SP GR SPEC: 1.01 — SIGNIFICANT CHANGE UP (ref 1.01–1.02)
TROPONIN I SERPL-MCNC: <0.015 NG/ML — SIGNIFICANT CHANGE UP (ref 0.01–0.04)
TSH SERPL-MCNC: 1.65 UU/ML — SIGNIFICANT CHANGE UP (ref 0.34–4.82)
UROBILINOGEN FLD QL: NEGATIVE MG/DL — SIGNIFICANT CHANGE UP
WBC # BLD: 8.89 K/UL — SIGNIFICANT CHANGE UP (ref 3.8–10.5)
WBC # FLD AUTO: 8.89 K/UL — SIGNIFICANT CHANGE UP (ref 3.8–10.5)
WBC UR QL: ABNORMAL

## 2019-04-17 PROCEDURE — 93010 ELECTROCARDIOGRAM REPORT: CPT

## 2019-04-17 PROCEDURE — 71045 X-RAY EXAM CHEST 1 VIEW: CPT | Mod: 26

## 2019-04-17 PROCEDURE — 99285 EMERGENCY DEPT VISIT HI MDM: CPT

## 2019-04-17 PROCEDURE — 70450 CT HEAD/BRAIN W/O DYE: CPT | Mod: 26

## 2019-04-17 RX ORDER — INSULIN LISPRO 100/ML
VIAL (ML) SUBCUTANEOUS
Qty: 0 | Refills: 0 | Status: DISCONTINUED | OUTPATIENT
Start: 2019-04-17 | End: 2019-04-23

## 2019-04-17 RX ORDER — SODIUM CHLORIDE 9 MG/ML
1000 INJECTION, SOLUTION INTRAVENOUS
Qty: 0 | Refills: 0 | Status: DISCONTINUED | OUTPATIENT
Start: 2019-04-17 | End: 2019-04-20

## 2019-04-17 RX ORDER — VALPROIC ACID (AS SODIUM SALT) 250 MG/5ML
1000 SOLUTION, ORAL ORAL ONCE
Qty: 0 | Refills: 0 | Status: COMPLETED | OUTPATIENT
Start: 2019-04-17 | End: 2019-04-17

## 2019-04-17 RX ORDER — DEXTROSE 50 % IN WATER 50 %
25 SYRINGE (ML) INTRAVENOUS ONCE
Qty: 0 | Refills: 0 | Status: DISCONTINUED | OUTPATIENT
Start: 2019-04-17 | End: 2019-04-23

## 2019-04-17 RX ORDER — HEPARIN SODIUM 5000 [USP'U]/ML
5000 INJECTION INTRAVENOUS; SUBCUTANEOUS EVERY 12 HOURS
Qty: 0 | Refills: 0 | Status: DISCONTINUED | OUTPATIENT
Start: 2019-04-17 | End: 2019-04-23

## 2019-04-17 RX ORDER — INSULIN HUMAN 100 [IU]/ML
10 INJECTION, SOLUTION SUBCUTANEOUS ONCE
Qty: 0 | Refills: 0 | Status: COMPLETED | OUTPATIENT
Start: 2019-04-17 | End: 2019-04-17

## 2019-04-17 RX ORDER — FERROUS SULFATE 325(65) MG
325 TABLET ORAL DAILY
Qty: 0 | Refills: 0 | Status: DISCONTINUED | OUTPATIENT
Start: 2019-04-17 | End: 2019-04-23

## 2019-04-17 RX ORDER — LORATADINE 10 MG/1
10 TABLET ORAL DAILY
Qty: 0 | Refills: 0 | Status: DISCONTINUED | OUTPATIENT
Start: 2019-04-17 | End: 2019-04-23

## 2019-04-17 RX ORDER — DOCUSATE SODIUM 100 MG
200 CAPSULE ORAL DAILY
Qty: 0 | Refills: 0 | Status: DISCONTINUED | OUTPATIENT
Start: 2019-04-17 | End: 2019-04-23

## 2019-04-17 RX ORDER — ACETAMINOPHEN 500 MG
650 TABLET ORAL EVERY 8 HOURS
Qty: 0 | Refills: 0 | Status: DISCONTINUED | OUTPATIENT
Start: 2019-04-17 | End: 2019-04-23

## 2019-04-17 RX ORDER — ESCITALOPRAM OXALATE 10 MG/1
40 TABLET, FILM COATED ORAL AT BEDTIME
Qty: 0 | Refills: 0 | Status: DISCONTINUED | OUTPATIENT
Start: 2019-04-17 | End: 2019-04-23

## 2019-04-17 RX ORDER — CEFTRIAXONE 500 MG/1
INJECTION, POWDER, FOR SOLUTION INTRAMUSCULAR; INTRAVENOUS
Qty: 0 | Refills: 0 | Status: DISCONTINUED | OUTPATIENT
Start: 2019-04-17 | End: 2019-04-17

## 2019-04-17 RX ORDER — PIPERACILLIN AND TAZOBACTAM 4; .5 G/20ML; G/20ML
3.38 INJECTION, POWDER, LYOPHILIZED, FOR SOLUTION INTRAVENOUS EVERY 8 HOURS
Qty: 0 | Refills: 0 | Status: DISCONTINUED | OUTPATIENT
Start: 2019-04-17 | End: 2019-04-18

## 2019-04-17 RX ORDER — SODIUM CHLORIDE 9 MG/ML
1000 INJECTION INTRAMUSCULAR; INTRAVENOUS; SUBCUTANEOUS
Qty: 0 | Refills: 0 | Status: DISCONTINUED | OUTPATIENT
Start: 2019-04-17 | End: 2019-04-17

## 2019-04-17 RX ORDER — SODIUM CHLORIDE 9 MG/ML
1000 INJECTION, SOLUTION INTRAVENOUS
Qty: 0 | Refills: 0 | Status: DISCONTINUED | OUTPATIENT
Start: 2019-04-17 | End: 2019-04-23

## 2019-04-17 RX ORDER — LEVETIRACETAM 250 MG/1
750 TABLET, FILM COATED ORAL
Qty: 0 | Refills: 0 | Status: DISCONTINUED | OUTPATIENT
Start: 2019-04-17 | End: 2019-04-17

## 2019-04-17 RX ORDER — KETOCONAZOLE 20 MG/G
1 AEROSOL, FOAM TOPICAL
Qty: 0 | Refills: 0 | Status: DISCONTINUED | OUTPATIENT
Start: 2019-04-17 | End: 2019-04-23

## 2019-04-17 RX ORDER — ZINC OXIDE 200 MG/G
1 OINTMENT TOPICAL DAILY
Qty: 0 | Refills: 0 | Status: DISCONTINUED | OUTPATIENT
Start: 2019-04-17 | End: 2019-04-23

## 2019-04-17 RX ORDER — TAMSULOSIN HYDROCHLORIDE 0.4 MG/1
0.8 CAPSULE ORAL AT BEDTIME
Qty: 0 | Refills: 0 | Status: DISCONTINUED | OUTPATIENT
Start: 2019-04-17 | End: 2019-04-23

## 2019-04-17 RX ORDER — CHOLECALCIFEROL (VITAMIN D3) 125 MCG
1000 CAPSULE ORAL DAILY
Qty: 0 | Refills: 0 | Status: DISCONTINUED | OUTPATIENT
Start: 2019-04-17 | End: 2019-04-23

## 2019-04-17 RX ORDER — PANTOPRAZOLE SODIUM 20 MG/1
40 TABLET, DELAYED RELEASE ORAL
Qty: 0 | Refills: 0 | Status: DISCONTINUED | OUTPATIENT
Start: 2019-04-17 | End: 2019-04-23

## 2019-04-17 RX ORDER — CEFTRIAXONE 500 MG/1
1000 INJECTION, POWDER, FOR SOLUTION INTRAMUSCULAR; INTRAVENOUS ONCE
Qty: 0 | Refills: 0 | Status: COMPLETED | OUTPATIENT
Start: 2019-04-17 | End: 2019-04-17

## 2019-04-17 RX ORDER — SODIUM CHLORIDE 9 MG/ML
2000 INJECTION INTRAMUSCULAR; INTRAVENOUS; SUBCUTANEOUS ONCE
Qty: 0 | Refills: 0 | Status: COMPLETED | OUTPATIENT
Start: 2019-04-17 | End: 2019-04-17

## 2019-04-17 RX ORDER — DEXTROSE 50 % IN WATER 50 %
15 SYRINGE (ML) INTRAVENOUS ONCE
Qty: 0 | Refills: 0 | Status: DISCONTINUED | OUTPATIENT
Start: 2019-04-17 | End: 2019-04-23

## 2019-04-17 RX ORDER — DEXTROSE 50 % IN WATER 50 %
12.5 SYRINGE (ML) INTRAVENOUS ONCE
Qty: 0 | Refills: 0 | Status: DISCONTINUED | OUTPATIENT
Start: 2019-04-17 | End: 2019-04-23

## 2019-04-17 RX ORDER — SODIUM CHLORIDE 9 MG/ML
1000 INJECTION INTRAMUSCULAR; INTRAVENOUS; SUBCUTANEOUS ONCE
Qty: 0 | Refills: 0 | Status: COMPLETED | OUTPATIENT
Start: 2019-04-17 | End: 2019-04-17

## 2019-04-17 RX ORDER — DIVALPROEX SODIUM 500 MG/1
1000 TABLET, DELAYED RELEASE ORAL AT BEDTIME
Qty: 0 | Refills: 0 | Status: DISCONTINUED | OUTPATIENT
Start: 2019-04-17 | End: 2019-04-23

## 2019-04-17 RX ORDER — GLUCAGON INJECTION, SOLUTION 0.5 MG/.1ML
1 INJECTION, SOLUTION SUBCUTANEOUS ONCE
Qty: 0 | Refills: 0 | Status: DISCONTINUED | OUTPATIENT
Start: 2019-04-17 | End: 2019-04-23

## 2019-04-17 RX ORDER — OXYBUTYNIN CHLORIDE 5 MG
10 TABLET ORAL DAILY
Qty: 0 | Refills: 0 | Status: DISCONTINUED | OUTPATIENT
Start: 2019-04-17 | End: 2019-04-18

## 2019-04-17 RX ORDER — INSULIN GLARGINE 100 [IU]/ML
10 INJECTION, SOLUTION SUBCUTANEOUS EVERY MORNING
Qty: 0 | Refills: 0 | Status: DISCONTINUED | OUTPATIENT
Start: 2019-04-17 | End: 2019-04-20

## 2019-04-17 RX ORDER — LEVETIRACETAM 250 MG/1
750 TABLET, FILM COATED ORAL EVERY 12 HOURS
Qty: 0 | Refills: 0 | Status: DISCONTINUED | OUTPATIENT
Start: 2019-04-17 | End: 2019-04-18

## 2019-04-17 RX ORDER — ASPIRIN/CALCIUM CARB/MAGNESIUM 324 MG
81 TABLET ORAL DAILY
Qty: 0 | Refills: 0 | Status: DISCONTINUED | OUTPATIENT
Start: 2019-04-17 | End: 2019-04-23

## 2019-04-17 RX ORDER — ARIPIPRAZOLE 15 MG/1
30 TABLET ORAL AT BEDTIME
Qty: 0 | Refills: 0 | Status: DISCONTINUED | OUTPATIENT
Start: 2019-04-17 | End: 2019-04-23

## 2019-04-17 RX ADMIN — INSULIN GLARGINE 10 UNIT(S): 100 INJECTION, SOLUTION SUBCUTANEOUS at 13:04

## 2019-04-17 RX ADMIN — SODIUM CHLORIDE 150 MILLILITER(S): 9 INJECTION INTRAMUSCULAR; INTRAVENOUS; SUBCUTANEOUS at 13:05

## 2019-04-17 RX ADMIN — INSULIN HUMAN 10 UNIT(S): 100 INJECTION, SOLUTION SUBCUTANEOUS at 09:43

## 2019-04-17 RX ADMIN — HEPARIN SODIUM 5000 UNIT(S): 5000 INJECTION INTRAVENOUS; SUBCUTANEOUS at 18:29

## 2019-04-17 RX ADMIN — PIPERACILLIN AND TAZOBACTAM 25 GRAM(S): 4; .5 INJECTION, POWDER, LYOPHILIZED, FOR SOLUTION INTRAVENOUS at 22:17

## 2019-04-17 RX ADMIN — LEVETIRACETAM 400 MILLIGRAM(S): 250 TABLET, FILM COATED ORAL at 23:35

## 2019-04-17 RX ADMIN — Medication 2: at 18:29

## 2019-04-17 RX ADMIN — SODIUM CHLORIDE 2000 MILLILITER(S): 9 INJECTION INTRAMUSCULAR; INTRAVENOUS; SUBCUTANEOUS at 09:44

## 2019-04-17 RX ADMIN — INSULIN HUMAN 10 UNIT(S): 100 INJECTION, SOLUTION SUBCUTANEOUS at 13:05

## 2019-04-17 RX ADMIN — SODIUM CHLORIDE 2000 MILLILITER(S): 9 INJECTION INTRAMUSCULAR; INTRAVENOUS; SUBCUTANEOUS at 08:24

## 2019-04-17 RX ADMIN — CEFTRIAXONE 1000 MILLIGRAM(S): 500 INJECTION, POWDER, FOR SOLUTION INTRAMUSCULAR; INTRAVENOUS at 15:18

## 2019-04-17 RX ADMIN — SODIUM CHLORIDE 100 MILLILITER(S): 9 INJECTION, SOLUTION INTRAVENOUS at 18:31

## 2019-04-17 RX ADMIN — SODIUM CHLORIDE 1000 MILLILITER(S): 9 INJECTION INTRAMUSCULAR; INTRAVENOUS; SUBCUTANEOUS at 15:33

## 2019-04-17 RX ADMIN — PANTOPRAZOLE SODIUM 40 MILLIGRAM(S): 20 TABLET, DELAYED RELEASE ORAL at 19:09

## 2019-04-17 RX ADMIN — SODIUM CHLORIDE 1000 MILLILITER(S): 9 INJECTION INTRAMUSCULAR; INTRAVENOUS; SUBCUTANEOUS at 11:01

## 2019-04-17 NOTE — H&P ADULT - ASSESSMENT
69 year old M with PMH of DM on metformin, glimepiride seizure disorder, Hyponatremia, Parkinson's from [facility] group home presents with altered mental status and hyperglycemia.     #) Altered Mental Status 2/2 Hyperglycemia, Hypernatremia 2/2 dehydration: Currently baseline mental status, stable. DKA ruled out. Received 10 units SC Insulin R and 3L NS in ED. Ordered 10 units Insulin R IV + Lantus 10 units at time of admission. No seizures.  - Rule out infectious causes of Hyperglycemia: UA, BCX, CXR, Lipase  - 3L NS given in ED  - Hypovolemic on exam: No hx of Heart failure, IVF 1/2 NS at 150 cc/hr  - Start Lantus 10 units qdaily for basal insulin, Adjust as needed after 24 hrs. FSBS goal 110-180 while admitted.  - Insulin Sliding Scale (Humalog)  - Check FSBS q1h until < 250.   - Check BMP q4h: Na correction < 6-8 mEq in 24hr. Hydrate with 1/2 NS.  - Hold Metformin and Glimeperide  - Lactic acidosis 2/2 metformin, hyperglycemia; Does not meet Sepsis criteria.  - Diabetic diet.    #) PRASAD, Pre renal 2/2 dehydration: CPK normal, Producing urine  - PO hydration and IVF hydration 1/2 NS  - Renal Ultrasound  - Avoid nephrotoxic medications, NSAIDs, Hold Metformin  - UOP goal 0.5 cc/kg/hr  - I&O  - BPH: Continue meds    #) Parkinson's  - Continue home medications    #) DVT ppx  - Heparin q12h    Disposition: Admit to Medicine. Mental status baseline, Control hyperglycemia, hypernatremia. 69 year old M with PMH of DM on metformin, glimepiride seizure disorder, Hyponatremia, Parkinson's from [facility] group home presents with altered mental status and hyperglycemia.     #) Altered Mental Status 2/2 Hyperglycemia, Hypernatremia: Currently baseline mental status, stable. DKA ruled out. Received 10 units SC Insulin R and 3L NS in ED. Ordered 10 units Insulin R IV + Lantus 10 units at time of admission. No seizures.  - Rule out infectious causes of Hyperglycemia: UA, BCX, CXR, Lipase  - 3L NS given in ED  - Hypovolemic on exam: No hx of Heart failure, IVF 1/2 NS at 100 cc/hr  - Start Lantus 10 units qdaily for basal insulin, Adjust as needed after 24 hrs. FSBS goal 110-180 while admitted.  - Insulin Sliding Scale (Humalog)  - Check FSBS q1h until < 250.   - Hold Metformin and Glimeperide  - Diabetic diet.    #) Lactic Acidosis: 2/2 Metformin, Hyperglycemia. Does not meet Sepsis Criteria  - Repeat Lactate  - Hold metformin  - IVF, Control Hyperglycemia.  - R/O infections    #) Hypernatremia 2/2 Dehydration: Reported hx of Hyponatremia on Sodium tablets  - Corrected Na 163  - Check BMP q4h: Na correction < 6-8 mEq in 24hr. Hydrate with 1/2 NS.    #) PRASAD, Pre renal 2/2 dehydration: CPK normal, Producing urine  - PO hydration and IVF hydration 1/2 NS  - Renal Ultrasound  - Avoid nephrotoxic medications, NSAIDs, Hold Metformin  - UOP goal 0.5 cc/kg/hr  - I&O  - BPH: Continue meds    #) Parkinson's  - Continue home medications    #) DVT ppx  - Heparin q12h    Disposition: Admit to Medicine. Mental status baseline, Control hyperglycemia, hypernatremia. 69 year old M with PMH of DM on metformin, glimepiride seizure disorder, Hyponatremia, Parkinson's from [facility] group home presents with altered mental status and hyperglycemia.     #) Altered Mental Status 2/2 Hyperglycemia, Hypernatremia, UTI: Currently baseline mental status, stable. DKA ruled out. Received 10 units SC Insulin R and 3L NS in ED. Ordered 10 units Insulin R IV + Lantus 10 units at time of admission. No seizures.  - UA with leuk esterase, WBC, bacteria: Will empirically cover for UTI: Ceftriaxone 1 g q24h, Send for Ucx.  - 3L NS given in ED  - Hypovolemic on exam: No hx of Heart failure, IVF 1/2 NS at 100 cc/hr  - Start Lantus 10 units qdaily for basal insulin, Adjust as needed after 24 hrs. FSBS goal 110-180 while admitted.  - Insulin Sliding Scale (Humalog)  - Check FSBS q1h until < 250.   - Hold Metformin and Glimeperide  - Diabetic diet.    #) Lactic Acidosis: 2/2 Metformin, Hyperglycemia. Does not meet Sepsis Criteria  - Repeat Lactate  - Hold metformin  - IVF, Control Hyperglycemia.    #) Hypernatremia 2/2 Dehydration: Reported hx of Hyponatremia on Sodium tablets  - Corrected Na 163  - Check BMP q4h: Na correction < 6-8 mEq in 24hr. Hydrate with 1/2 NS.    #) PRASAD, Pre renal 2/2 dehydration: CPK normal, Producing urine  - PO hydration and IVF hydration 1/2 NS  - Renal Ultrasound  - Avoid nephrotoxic medications, NSAIDs, Hold Metformin  - UOP goal 0.5 cc/kg/hr  - I&O  - BPH: Continue meds    #) Parkinson's  - Continue home medications    #) DVT ppx  - Heparin q12h    Disposition: Admit to Medicine. Mental status baseline, Control hyperglycemia, hypernatremia.

## 2019-04-17 NOTE — ED ADULT NURSE REASSESSMENT NOTE - NS ED NURSE REASSESS COMMENT FT1
Dr. Ware at bedside-stat BMP and lactate ordered-drawn and brought to lab by nurse. Pt lethargic but arousable to voice.

## 2019-04-17 NOTE — H&P ADULT - HISTORY OF PRESENT ILLNESS
69 year old M with PMH of DM on metformin, glimeperide, seizure disorder, Hyponatremia, Parkinson's from [facility] group home presents with altered mental status and hyperglycemia. Patient was noted at the group home to be fatigued and "not himself". As per the aid, patient is usually active and can walk on his own. Patient appeared tired and was not interacting well as usual. Patient was also noted to have an elevated blood sugar level. Aid states that when he has a level of > 240, that is requirement for evaluation in the ED. Patient reportedly has had well controlled blood sugars with around 170s on 4/14/19. Patient has not had any cough, fever, chills, diarrhea, dysuria, hematuria, ear pain, sorethroat, wheezing, shortness of breath. Patient was not complaining of any symptoms. Patient currently denies any complaints. Aid states he is now acting like himself.    ED Intervention: 3L of NS, 10 units Insulin R SC.    FamHx non contributory to current condition.

## 2019-04-17 NOTE — ED ADULT NURSE REASSESSMENT NOTE - NS ED NURSE REASSESS COMMENT FT1
coude inserted by md olivia, 300 ml of urine out, patient eating dinner then will be brought to cicu

## 2019-04-17 NOTE — ED PROVIDER NOTE - OBJECTIVE STATEMENT
70 y/o male with a PMHx of DM on Metformin, developmental disability, HTN, Parkinson's disease, seizures, SIADH, presents to the ED from group home c/o hyperglycemia and AMS. Pt normally walks and has full conversations. This morning, pt found to be confused, only answering yes or no and not acting appropriately. Sugar found to be high at facility. No other complaints at this time.

## 2019-04-17 NOTE — ED ADULT NURSE REASSESSMENT NOTE - NS ED NURSE REASSESS COMMENT FT1
Dr. Ware called and made aware patient slightly hypotensive-vitals otherwise within normal range. IVF infusing as orders. lactate and blood cultures sent. BP to be monitored. Blood sugar checked. Pt resting comfortably-no distress noted. Report received from ED RN Migdalia. Dr. Ware called and made aware patient slightly hypotensive-vitals otherwise within normal range. IVF infusing as orders. lactate and blood cultures sent. BP to be monitored. Blood sugar checked. Pt resting comfortably-no distress noted. Pt tolerated lunch with no nausea. Pt alert to self-reoriented otherwise. Pt denies pain. Plan of care discussed, pt and aide at bedside made aware transfer is pending orders and bed availability.

## 2019-04-17 NOTE — H&P ADULT - NSICDXPASTMEDICALHX_GEN_ALL_CORE_FT
PAST MEDICAL HISTORY:  BPH (benign prostatic hyperplasia)     Chronic GERD     Developmental disability     Diabetes     Hypertension     Hyponatremia     Incontinence     Osteoporosis     Parkinson disease     Seizures     SIADH (syndrome of inappropriate ADH production)

## 2019-04-17 NOTE — ED ADULT NURSE REASSESSMENT NOTE - NS ED NURSE REASSESS COMMENT FT1
Lactate came back at 5.1-Dr. Ware made aware. Bolus of normal saline ordered and repeat lactate in two hours. Charge nurse made aware.

## 2019-04-17 NOTE — H&P ADULT - NSHPSOURCEINFOTX_GEN_ALL_CORE
Health care proxy is Brother who is currently incarcerated. Home aid states he is likely Full code but unsure. Will confirm and contact us.

## 2019-04-17 NOTE — H&P ADULT - NSHPREVIEWOFSYSTEMS_GEN_ALL_CORE
Review of Systems:  CONSTITUTIONAL: Weakness but no fevers or chills  EYES/ENT: No visual changes;  No vertigo or throat pain   NECK: No pain or stiffness  RESPIRATORY: No cough, wheezing, hemoptysis; No shortness of breath,   CARDIOVASCULAR: No chest pain or palpitations  GASTROINTESTINAL: No abdominal or epigastric pain. No nausea, vomiting, or hematemesis; No diarrhea or constipation.   GENITOURINARY: No dysuria, frequency or hematuria  NEUROLOGICAL: No numbness or weakness  SKIN: No itching, burning, rashes, or lesions   All other review of systems is negative unless indicated above

## 2019-04-17 NOTE — ED ADULT TRIAGE NOTE - CHIEF COMPLAINT QUOTE
Pt sent from group [facility] for AMS, high blood sugar (500s per EMS, 517 on arrival).  Pt without complaints.   cc infused through 18g IV by EMS prior to arrival.

## 2019-04-17 NOTE — H&P ADULT - NSHPPHYSICALEXAM_GEN_ALL_CORE
Vital Signs Last 24 Hrs  T(C): 36.5 (17 Apr 2019 13:09), Max: 37.2 (17 Apr 2019 08:27)  T(F): 97.7 (17 Apr 2019 13:09), Max: 99 (17 Apr 2019 08:27)  HR: 83 (17 Apr 2019 13:09) (83 - 88)  BP: 105/63 (17 Apr 2019 13:09) (100/73 - 120/73)  BP(mean): --  RR: 20 (17 Apr 2019 13:09) (17 - 20)  SpO2: 97% (17 Apr 2019 13:09) (97% - 98%)    General: WN/WD NAD  Head- Atraumatic, normocephalic   Neurology: AAOx 2 (Self, Place), nonfocal,   HEENT- PERRLA, dry mucous membrane, no PTA, no erythema, no drooling, no trismus  Neck-supple, no JVD  Respiratory: Air entry equal b/l, CTA   CVS:  S1S2, no murmurs, rubs or gallops  Abdominal: Soft, NT, ND +BS,   Genitourinary- voiding, non palpable bladder, wearing diaper  Extremities: No edema, + peripheral pulses  Skin- no rash, no ulcer  Psychiatric- mood stable   LN- no lymphadenopathy

## 2019-04-17 NOTE — ED ADULT NURSE NOTE - CHIEF COMPLAINT QUOTE
Pt sent from group [facility] for AMS, high blood sugar (500s per EMS, 517 on arrival).  Pt without complaints.   cc infused through 18g IV by EMS prior to arrival.

## 2019-04-17 NOTE — ED PROVIDER NOTE - PROGRESS NOTE DETAILS
Rama Chen for attending Dr. Webster: Case discussed with Dr. Navarrete. Pt can be admitted to floor. Rama Chen for attending Dr. Webster: Case discussed with Dr. D'amico. Will admit.

## 2019-04-17 NOTE — ED ADULT NURSE REASSESSMENT NOTE - NS ED NURSE REASSESS COMMENT FT1
Received report from tod YODER and reassessed patient. Agree with the previous RN assessment. PT to have coude inserted after bailey was failed to be inserted. Md olivia aware and at bedside inserting coude.

## 2019-04-17 NOTE — H&P ADULT - NSHPSOCIALHISTORY_GEN_ALL_CORE
Lives in group home at [facility]. Denies Tobacco, ETOH, Illicit Drug use

## 2019-04-17 NOTE — ED ADULT NURSE NOTE - NSIMPLEMENTINTERV_GEN_ALL_ED
Implemented All Fall Risk Interventions:  Emma to call system. Call bell, personal items and telephone within reach. Instruct patient to call for assistance. Room bathroom lighting operational. Non-slip footwear when patient is off stretcher. Physically safe environment: no spills, clutter or unnecessary equipment. Stretcher in lowest position, wheels locked, appropriate side rails in place. Provide visual cue, wrist band, yellow gown, etc. Monitor gait and stability. Monitor for mental status changes and reorient to person, place, and time. Review medications for side effects contributing to fall risk. Reinforce activity limits and safety measures with patient and family.

## 2019-04-18 LAB
ANION GAP SERPL CALC-SCNC: 10 MMOL/L — SIGNIFICANT CHANGE UP (ref 5–17)
ANION GAP SERPL CALC-SCNC: 7 MMOL/L — SIGNIFICANT CHANGE UP (ref 5–17)
BUN SERPL-MCNC: 27 MG/DL — HIGH (ref 7–23)
BUN SERPL-MCNC: 28 MG/DL — HIGH (ref 7–23)
CALCIUM SERPL-MCNC: 7.7 MG/DL — LOW (ref 8.5–10.1)
CALCIUM SERPL-MCNC: 8 MG/DL — LOW (ref 8.5–10.1)
CHLORIDE SERPL-SCNC: 121 MMOL/L — HIGH (ref 96–108)
CHLORIDE SERPL-SCNC: 126 MMOL/L — HIGH (ref 96–108)
CO2 SERPL-SCNC: 23 MMOL/L — SIGNIFICANT CHANGE UP (ref 22–31)
CO2 SERPL-SCNC: 25 MMOL/L — SIGNIFICANT CHANGE UP (ref 22–31)
CREAT SERPL-MCNC: 0.91 MG/DL — SIGNIFICANT CHANGE UP (ref 0.5–1.3)
CREAT SERPL-MCNC: 0.93 MG/DL — SIGNIFICANT CHANGE UP (ref 0.5–1.3)
CULTURE RESULTS: SIGNIFICANT CHANGE UP
GLUCOSE BLDC GLUCOMTR-MCNC: 152 MG/DL — HIGH (ref 70–99)
GLUCOSE BLDC GLUCOMTR-MCNC: 171 MG/DL — HIGH (ref 70–99)
GLUCOSE BLDC GLUCOMTR-MCNC: 200 MG/DL — HIGH (ref 70–99)
GLUCOSE BLDC GLUCOMTR-MCNC: 205 MG/DL — HIGH (ref 70–99)
GLUCOSE SERPL-MCNC: 197 MG/DL — HIGH (ref 70–99)
GLUCOSE SERPL-MCNC: 210 MG/DL — HIGH (ref 70–99)
POTASSIUM SERPL-MCNC: 3.5 MMOL/L — SIGNIFICANT CHANGE UP (ref 3.5–5.3)
POTASSIUM SERPL-MCNC: 3.7 MMOL/L — SIGNIFICANT CHANGE UP (ref 3.5–5.3)
POTASSIUM SERPL-SCNC: 3.5 MMOL/L — SIGNIFICANT CHANGE UP (ref 3.5–5.3)
POTASSIUM SERPL-SCNC: 3.7 MMOL/L — SIGNIFICANT CHANGE UP (ref 3.5–5.3)
SODIUM SERPL-SCNC: 153 MMOL/L — HIGH (ref 135–145)
SODIUM SERPL-SCNC: 159 MMOL/L — HIGH (ref 135–145)
SPECIMEN SOURCE: SIGNIFICANT CHANGE UP
VALPROATE SERPL-MCNC: 35 UG/ML — LOW (ref 50–100)

## 2019-04-18 PROCEDURE — 76770 US EXAM ABDO BACK WALL COMP: CPT | Mod: 26

## 2019-04-18 RX ORDER — CEFTRIAXONE 500 MG/1
INJECTION, POWDER, FOR SOLUTION INTRAMUSCULAR; INTRAVENOUS
Qty: 0 | Refills: 0 | Status: DISCONTINUED | OUTPATIENT
Start: 2019-04-18 | End: 2019-04-18

## 2019-04-18 RX ORDER — LEVETIRACETAM 250 MG/1
750 TABLET, FILM COATED ORAL
Qty: 0 | Refills: 0 | Status: DISCONTINUED | OUTPATIENT
Start: 2019-04-18 | End: 2019-04-23

## 2019-04-18 RX ORDER — POTASSIUM CHLORIDE 20 MEQ
40 PACKET (EA) ORAL ONCE
Qty: 0 | Refills: 0 | Status: COMPLETED | OUTPATIENT
Start: 2019-04-18 | End: 2019-04-18

## 2019-04-18 RX ORDER — ROPINIROLE 8 MG/1
2 TABLET, FILM COATED, EXTENDED RELEASE ORAL THREE TIMES A DAY
Qty: 0 | Refills: 0 | Status: DISCONTINUED | OUTPATIENT
Start: 2019-04-18 | End: 2019-04-23

## 2019-04-18 RX ORDER — CEFTRIAXONE 500 MG/1
1000 INJECTION, POWDER, FOR SOLUTION INTRAMUSCULAR; INTRAVENOUS ONCE
Qty: 0 | Refills: 0 | Status: COMPLETED | OUTPATIENT
Start: 2019-04-18 | End: 2019-04-18

## 2019-04-18 RX ORDER — CEFTRIAXONE 500 MG/1
INJECTION, POWDER, FOR SOLUTION INTRAMUSCULAR; INTRAVENOUS
Qty: 0 | Refills: 0 | Status: DISCONTINUED | OUTPATIENT
Start: 2019-04-18 | End: 2019-04-22

## 2019-04-18 RX ORDER — CEFTRIAXONE 500 MG/1
1000 INJECTION, POWDER, FOR SOLUTION INTRAMUSCULAR; INTRAVENOUS EVERY 24 HOURS
Qty: 0 | Refills: 0 | Status: DISCONTINUED | OUTPATIENT
Start: 2019-04-19 | End: 2019-04-22

## 2019-04-18 RX ORDER — POTASSIUM CHLORIDE 20 MEQ
40 PACKET (EA) ORAL ONCE
Qty: 0 | Refills: 0 | Status: DISCONTINUED | OUTPATIENT
Start: 2019-04-18 | End: 2019-04-18

## 2019-04-18 RX ADMIN — Medication 81 MILLIGRAM(S): at 13:04

## 2019-04-18 RX ADMIN — TAMSULOSIN HYDROCHLORIDE 0.8 MILLIGRAM(S): 0.4 CAPSULE ORAL at 21:43

## 2019-04-18 RX ADMIN — DIVALPROEX SODIUM 1000 MILLIGRAM(S): 500 TABLET, DELAYED RELEASE ORAL at 21:44

## 2019-04-18 RX ADMIN — LORATADINE 10 MILLIGRAM(S): 10 TABLET ORAL at 13:04

## 2019-04-18 RX ADMIN — PIPERACILLIN AND TAZOBACTAM 25 GRAM(S): 4; .5 INJECTION, POWDER, LYOPHILIZED, FOR SOLUTION INTRAVENOUS at 15:03

## 2019-04-18 RX ADMIN — LEVETIRACETAM 750 MILLIGRAM(S): 250 TABLET, FILM COATED ORAL at 17:16

## 2019-04-18 RX ADMIN — ESCITALOPRAM OXALATE 40 MILLIGRAM(S): 10 TABLET, FILM COATED ORAL at 21:44

## 2019-04-18 RX ADMIN — ROPINIROLE 2 MILLIGRAM(S): 8 TABLET, FILM COATED, EXTENDED RELEASE ORAL at 17:17

## 2019-04-18 RX ADMIN — LEVETIRACETAM 400 MILLIGRAM(S): 250 TABLET, FILM COATED ORAL at 09:34

## 2019-04-18 RX ADMIN — HEPARIN SODIUM 5000 UNIT(S): 5000 INJECTION INTRAVENOUS; SUBCUTANEOUS at 05:45

## 2019-04-18 RX ADMIN — INSULIN GLARGINE 10 UNIT(S): 100 INJECTION, SOLUTION SUBCUTANEOUS at 08:47

## 2019-04-18 RX ADMIN — PIPERACILLIN AND TAZOBACTAM 25 GRAM(S): 4; .5 INJECTION, POWDER, LYOPHILIZED, FOR SOLUTION INTRAVENOUS at 05:45

## 2019-04-18 RX ADMIN — ROPINIROLE 2 MILLIGRAM(S): 8 TABLET, FILM COATED, EXTENDED RELEASE ORAL at 21:42

## 2019-04-18 RX ADMIN — Medication 1000 UNIT(S): at 13:04

## 2019-04-18 RX ADMIN — Medication 325 MILLIGRAM(S): at 13:04

## 2019-04-18 RX ADMIN — Medication 2: at 13:03

## 2019-04-18 RX ADMIN — HEPARIN SODIUM 5000 UNIT(S): 5000 INJECTION INTRAVENOUS; SUBCUTANEOUS at 17:17

## 2019-04-18 RX ADMIN — CEFTRIAXONE 1000 MILLIGRAM(S): 500 INJECTION, POWDER, FOR SOLUTION INTRAMUSCULAR; INTRAVENOUS at 19:51

## 2019-04-18 RX ADMIN — ARIPIPRAZOLE 30 MILLIGRAM(S): 15 TABLET ORAL at 21:52

## 2019-04-18 RX ADMIN — PANTOPRAZOLE SODIUM 40 MILLIGRAM(S): 20 TABLET, DELAYED RELEASE ORAL at 17:16

## 2019-04-18 RX ADMIN — Medication 4: at 09:33

## 2019-04-18 RX ADMIN — PANTOPRAZOLE SODIUM 40 MILLIGRAM(S): 20 TABLET, DELAYED RELEASE ORAL at 06:51

## 2019-04-18 RX ADMIN — Medication 40 MILLIEQUIVALENT(S): at 17:17

## 2019-04-18 NOTE — CONSULT NOTE ADULT - ASSESSMENT
69 year old M with PMH of DM on metformin, glimeperide, seizure disorder, Hyponatremia, Parkinson's from [facility] group home presents with altered mental status and hyperglycemia. Patient was noted at the group home to be fatigued and "not himself". As per the aid, patient is usually active and can walk on his own. Patient appeared tired and was not interacting well as usual. Patient was also noted to have an elevated blood sugar level. Aid states that when he has a level of > 240, that is requirement for evaluation in the ED. Patient reportedly has had well controlled blood sugars with around 170s on 4/14/19. Patient has not had any cough, fever, chills, diarrhea, dysuria, hematuria, ear pain, sorethroat, wheezing, shortness of breath. Patient was not complaining of any symptoms. Patient currently denies any complaints. Aid states he is now acting like himself.  Above from chart:  Pts aide at bedside  discussed pts water ingestion habits, she states pt does not request water  unless offered my not drink  pt denies being thirsty although  he is hypernatremic  mental state at baseline]  no new events  NS administerd initially due to hypovolemia  now switched to 1/2 NS  K 3.5  will replace w oral kcl

## 2019-04-18 NOTE — PROVIDER CONTACT NOTE (OTHER) - SITUATION
Notified Dr. Graf office regarding consult spoke with Margaret from answering service.
notified service; spoke to Farhad

## 2019-04-18 NOTE — CONSULT NOTE ADULT - SUBJECTIVE AND OBJECTIVE BOX
NEPHROLOGY CONSULT  HPI:  69 year old M with PMH of DM on metformin, glimeperide, seizure disorder, Hyponatremia, Parkinson's from [facility] group home presents with altered mental status and hyperglycemia. Patient was noted at the group home to be fatigued and "not himself". As per the aid, patient is usually active and can walk on his own. Patient appeared tired and was not interacting well as usual. Patient was also noted to have an elevated blood sugar level. Aid states that when he has a level of > 240, that is requirement for evaluation in the ED. Patient reportedly has had well controlled blood sugars with around 170s on 4/14/19. Patient has not had any cough, fever, chills, diarrhea, dysuria, hematuria, ear pain, sorethroat, wheezing, shortness of breath. Patient was not complaining of any symptoms. Patient currently denies any complaints. Aid states he is now acting like himself.  Above from chart:  Pts aide at bedside  discussed pts water ingestion habits, she states pt does not request water  unless offered my not drink  pt denies being thirsty although  he is hypernatremic  mental state at baseline]  no new events  NS administerd initially due to hypovolemia  now switched to 1/2 NS        FamHx non contributory to current condition. (17 Apr 2019 13:11)      PAST MEDICAL & SURGICAL HISTORY:  Osteoporosis  Incontinence  BPH (benign prostatic hyperplasia)  Chronic GERD  Hyponatremia  SIADH (syndrome of inappropriate ADH production)  Diabetes  Developmental disability  Hypertension  Seizures  Parkinson disease  H/O foot surgery      FAMILY HISTORY:  No pertinent family history in first degree relatives      MEDICATIONS  (STANDING):  ARIPiprazole 30 milliGRAM(s) Oral at bedtime  aspirin enteric coated 81 milliGRAM(s) Oral daily  cholecalciferol 1000 Unit(s) Oral daily  dextrose 5%. 1000 milliLiter(s) (50 mL/Hr) IV Continuous <Continuous>  dextrose 50% Injectable 12.5 Gram(s) IV Push once  dextrose 50% Injectable 25 Gram(s) IV Push once  dextrose 50% Injectable 25 Gram(s) IV Push once  diVALproex ER 1000 milliGRAM(s) Oral at bedtime  docusate sodium Oral Tab/Cap - Peds 200 milliGRAM(s) Oral daily  escitalopram 40 milliGRAM(s) Oral at bedtime  ferrous sulfate Oral Tab/Cap - Peds 325 milliGRAM(s) Oral daily  heparin  Injectable 5000 Unit(s) SubCutaneous every 12 hours  insulin glargine Injectable (LANTUS) 10 Unit(s) SubCutaneous every morning  insulin lispro (HumaLOG) corrective regimen sliding scale   SubCutaneous three times a day before meals  ketoconazole 2% Shampoo 1 Application(s) Topical <User Schedule>  levETIRAcetam 750 milliGRAM(s) Oral two times a day  loratadine 10 milliGRAM(s) Oral daily  pantoprazole    Tablet 40 milliGRAM(s) Oral two times a day  piperacillin/tazobactam IVPB. 3.375 Gram(s) IV Intermittent every 8 hours  rOPINIRole 2 milliGRAM(s) Oral three times a day  sodium chloride 0.45%. 1000 milliLiter(s) (100 mL/Hr) IV Continuous <Continuous>  tamsulosin 0.8 milliGRAM(s) Oral at bedtime    MEDICATIONS  (PRN):  acetaminophen   Tablet .. 650 milliGRAM(s) Oral every 8 hours PRN Temp greater or equal to 38C (100.4F), Mild Pain (1 - 3)  dextrose 40% Gel 15 Gram(s) Oral once PRN Blood Glucose LESS THAN 70 milliGRAM(s)/deciliter  glucagon  Injectable 1 milliGRAM(s) IntraMuscular once PRN Glucose LESS THAN 70 milligrams/deciliter  zinc oxide 20% Ointment 1 Application(s) Topical daily PRN Rash      Allergies    No Known Allergies    Intolerances          REVIEW OF SYSTEMS:    UTO    Vital Signs Last 24 Hrs  T(C): 36.3 (18 Apr 2019 16:09), Max: 36.6 (18 Apr 2019 05:43)  T(F): 97.3 (18 Apr 2019 16:09), Max: 97.8 (18 Apr 2019 05:43)  HR: 63 (18 Apr 2019 16:00) (55 - 77)  BP: 95/56 (18 Apr 2019 16:00) (88/57 - 123/83)  BP(mean): 66 (18 Apr 2019 16:00) (55 - 94)  RR: 24 (18 Apr 2019 16:00) (15 - 27)  SpO2: 93% (18 Apr 2019 06:00) (91% - 96%)  PHYSICAL EXAM:    General:  Alert,does not answer question, only smiles or laughs    Neuro:  non verbal alert    HEENT:  No JVD, no masses, Eyes anicteric, No carotid bruits.No lymphadenopathy,    Cardiovascular:  Regular rate and rhythm, with normal S1 and S2. No murmurs, rubs,  or gallops. No JVD.     Lungs:  clear. no rales, no wheezing, .    Abdomen:  Normoactive bowel sounds. Soft, flat, non-tender, and non-distended.  No hepatosplenomegaly, positive bowel sounds    Skin:  Warm, dry, well-perfused. No rashes or other lesions.     Extremities:  2+ pulses in upper and lower extremities.    LABS:    04-18    153<H>  |  121<H>  |  27<H>  ----------------------------<  197<H>  3.5   |  25  |  0.93    Ca    8.0<L>      18 Apr 2019 06:50  Mg     2.2     04-17    TPro  6.0  /  Alb  3.1<L>  /  TBili  0.4  /  DBili  x   /  AST  <3<L>  /  ALT  <6<L>  /  AlkPhos  70  04-17                          13.0   8.89  )-----------( 142      ( 17 Apr 2019 08:32 )             40.0

## 2019-04-19 LAB
ALBUMIN SERPL ELPH-MCNC: 2.4 G/DL — LOW (ref 3.3–5)
ANION GAP SERPL CALC-SCNC: 9 MMOL/L — SIGNIFICANT CHANGE UP (ref 5–17)
BUN SERPL-MCNC: 14 MG/DL — SIGNIFICANT CHANGE UP (ref 7–23)
CALCIUM SERPL-MCNC: 7.5 MG/DL — LOW (ref 8.5–10.1)
CHLORIDE SERPL-SCNC: 114 MMOL/L — HIGH (ref 96–108)
CO2 SERPL-SCNC: 24 MMOL/L — SIGNIFICANT CHANGE UP (ref 22–31)
CREAT SERPL-MCNC: 0.75 MG/DL — SIGNIFICANT CHANGE UP (ref 0.5–1.3)
GLUCOSE BLDC GLUCOMTR-MCNC: 222 MG/DL — HIGH (ref 70–99)
GLUCOSE BLDC GLUCOMTR-MCNC: 261 MG/DL — HIGH (ref 70–99)
GLUCOSE BLDC GLUCOMTR-MCNC: 312 MG/DL — HIGH (ref 70–99)
GLUCOSE SERPL-MCNC: 283 MG/DL — HIGH (ref 70–99)
PHOSPHATE SERPL-MCNC: 2.6 MG/DL — SIGNIFICANT CHANGE UP (ref 2.5–4.5)
POTASSIUM SERPL-MCNC: 3.5 MMOL/L — SIGNIFICANT CHANGE UP (ref 3.5–5.3)
POTASSIUM SERPL-SCNC: 3.5 MMOL/L — SIGNIFICANT CHANGE UP (ref 3.5–5.3)
SODIUM SERPL-SCNC: 147 MMOL/L — HIGH (ref 135–145)

## 2019-04-19 RX ORDER — KETOCONAZOLE 20 MG/G
1 AEROSOL, FOAM TOPICAL
Qty: 0 | Refills: 0 | COMMUNITY

## 2019-04-19 RX ORDER — METFORMIN HYDROCHLORIDE 850 MG/1
2 TABLET ORAL
Qty: 0 | Refills: 0 | COMMUNITY

## 2019-04-19 RX ORDER — GLIMEPIRIDE 1 MG
0.5 TABLET ORAL
Qty: 0 | Refills: 0 | COMMUNITY

## 2019-04-19 RX ORDER — ROPINIROLE 8 MG/1
1 TABLET, FILM COATED, EXTENDED RELEASE ORAL
Qty: 0 | Refills: 0 | COMMUNITY

## 2019-04-19 RX ORDER — ACETAMINOPHEN 500 MG
2 TABLET ORAL
Qty: 0 | Refills: 0 | COMMUNITY

## 2019-04-19 RX ORDER — OXYBUTYNIN CHLORIDE 5 MG
1 TABLET ORAL
Qty: 0 | Refills: 0 | COMMUNITY

## 2019-04-19 RX ORDER — ARIPIPRAZOLE 15 MG/1
1 TABLET ORAL
Qty: 0 | Refills: 0 | COMMUNITY

## 2019-04-19 RX ORDER — DOCUSATE SODIUM 100 MG
2 CAPSULE ORAL
Qty: 0 | Refills: 0 | COMMUNITY

## 2019-04-19 RX ORDER — ASPIRIN/CALCIUM CARB/MAGNESIUM 324 MG
1 TABLET ORAL
Qty: 0 | Refills: 0 | COMMUNITY

## 2019-04-19 RX ORDER — DIVALPROEX SODIUM 500 MG/1
2 TABLET, DELAYED RELEASE ORAL
Qty: 0 | Refills: 0 | COMMUNITY

## 2019-04-19 RX ORDER — CHOLECALCIFEROL (VITAMIN D3) 125 MCG
1 CAPSULE ORAL
Qty: 0 | Refills: 0 | COMMUNITY

## 2019-04-19 RX ORDER — ARIPIPRAZOLE 15 MG/1
30 TABLET ORAL
Qty: 0 | Refills: 0 | COMMUNITY

## 2019-04-19 RX ORDER — POLYETHYLENE GLYCOL 3350 17 G/17G
17 POWDER, FOR SOLUTION ORAL
Qty: 0 | Refills: 0 | COMMUNITY

## 2019-04-19 RX ORDER — ESCITALOPRAM OXALATE 10 MG/1
2 TABLET, FILM COATED ORAL
Qty: 0 | Refills: 0 | COMMUNITY

## 2019-04-19 RX ORDER — LEVETIRACETAM 250 MG/1
1 TABLET, FILM COATED ORAL
Qty: 0 | Refills: 0 | COMMUNITY

## 2019-04-19 RX ORDER — PANTOPRAZOLE SODIUM 20 MG/1
1 TABLET, DELAYED RELEASE ORAL
Qty: 0 | Refills: 0 | COMMUNITY

## 2019-04-19 RX ORDER — CARBIDOPA AND LEVODOPA 25; 100 MG/1; MG/1
2 TABLET ORAL
Qty: 0 | Refills: 0 | COMMUNITY

## 2019-04-19 RX ORDER — TAMSULOSIN HYDROCHLORIDE 0.4 MG/1
1 CAPSULE ORAL
Qty: 0 | Refills: 0 | COMMUNITY

## 2019-04-19 RX ORDER — LORATADINE 10 MG/1
1 TABLET ORAL
Qty: 0 | Refills: 0 | COMMUNITY

## 2019-04-19 RX ORDER — FERROUS SULFATE 325(65) MG
1 TABLET ORAL
Qty: 0 | Refills: 0 | COMMUNITY

## 2019-04-19 RX ORDER — CARBIDOPA AND LEVODOPA 25; 100 MG/1; MG/1
1 TABLET ORAL
Qty: 0 | Refills: 0 | COMMUNITY

## 2019-04-19 RX ORDER — CICLOPIROX OLAMINE 7.7 MG/G
1 CREAM TOPICAL
Qty: 0 | Refills: 0 | COMMUNITY

## 2019-04-19 RX ORDER — SODIUM CHLORIDE 9 MG/ML
1 INJECTION INTRAMUSCULAR; INTRAVENOUS; SUBCUTANEOUS
Qty: 0 | Refills: 0 | COMMUNITY

## 2019-04-19 RX ORDER — ZINC OXIDE 200 MG/G
1 OINTMENT TOPICAL
Qty: 0 | Refills: 0 | COMMUNITY

## 2019-04-19 RX ORDER — METFORMIN HYDROCHLORIDE 850 MG/1
1 TABLET ORAL
Qty: 0 | Refills: 0 | COMMUNITY

## 2019-04-19 RX ADMIN — PANTOPRAZOLE SODIUM 40 MILLIGRAM(S): 20 TABLET, DELAYED RELEASE ORAL at 17:04

## 2019-04-19 RX ADMIN — SODIUM CHLORIDE 100 MILLILITER(S): 9 INJECTION, SOLUTION INTRAVENOUS at 17:55

## 2019-04-19 RX ADMIN — ARIPIPRAZOLE 30 MILLIGRAM(S): 15 TABLET ORAL at 21:01

## 2019-04-19 RX ADMIN — DIVALPROEX SODIUM 1000 MILLIGRAM(S): 500 TABLET, DELAYED RELEASE ORAL at 21:04

## 2019-04-19 RX ADMIN — ROPINIROLE 2 MILLIGRAM(S): 8 TABLET, FILM COATED, EXTENDED RELEASE ORAL at 21:01

## 2019-04-19 RX ADMIN — LEVETIRACETAM 750 MILLIGRAM(S): 250 TABLET, FILM COATED ORAL at 05:57

## 2019-04-19 RX ADMIN — Medication 4: at 17:03

## 2019-04-19 RX ADMIN — HEPARIN SODIUM 5000 UNIT(S): 5000 INJECTION INTRAVENOUS; SUBCUTANEOUS at 05:56

## 2019-04-19 RX ADMIN — ESCITALOPRAM OXALATE 40 MILLIGRAM(S): 10 TABLET, FILM COATED ORAL at 21:01

## 2019-04-19 RX ADMIN — Medication 6: at 12:30

## 2019-04-19 RX ADMIN — SODIUM CHLORIDE 100 MILLILITER(S): 9 INJECTION, SOLUTION INTRAVENOUS at 05:58

## 2019-04-19 RX ADMIN — HEPARIN SODIUM 5000 UNIT(S): 5000 INJECTION INTRAVENOUS; SUBCUTANEOUS at 17:04

## 2019-04-19 RX ADMIN — LEVETIRACETAM 750 MILLIGRAM(S): 250 TABLET, FILM COATED ORAL at 17:04

## 2019-04-19 RX ADMIN — INSULIN GLARGINE 10 UNIT(S): 100 INJECTION, SOLUTION SUBCUTANEOUS at 08:29

## 2019-04-19 RX ADMIN — Medication 1000 UNIT(S): at 12:31

## 2019-04-19 RX ADMIN — LORATADINE 10 MILLIGRAM(S): 10 TABLET ORAL at 12:31

## 2019-04-19 RX ADMIN — ROPINIROLE 2 MILLIGRAM(S): 8 TABLET, FILM COATED, EXTENDED RELEASE ORAL at 06:03

## 2019-04-19 RX ADMIN — Medication 325 MILLIGRAM(S): at 12:31

## 2019-04-19 RX ADMIN — Medication 8: at 08:28

## 2019-04-19 RX ADMIN — TAMSULOSIN HYDROCHLORIDE 0.8 MILLIGRAM(S): 0.4 CAPSULE ORAL at 21:01

## 2019-04-19 RX ADMIN — PANTOPRAZOLE SODIUM 40 MILLIGRAM(S): 20 TABLET, DELAYED RELEASE ORAL at 06:04

## 2019-04-19 RX ADMIN — ROPINIROLE 2 MILLIGRAM(S): 8 TABLET, FILM COATED, EXTENDED RELEASE ORAL at 17:03

## 2019-04-19 RX ADMIN — CEFTRIAXONE 1000 MILLIGRAM(S): 500 INJECTION, POWDER, FOR SOLUTION INTRAMUSCULAR; INTRAVENOUS at 17:04

## 2019-04-19 RX ADMIN — Medication 81 MILLIGRAM(S): at 12:31

## 2019-04-19 NOTE — PHARMACOTHERAPY INTERVENTION NOTE - COMMENTS
Completed medication history as per group home paperwork  Sinemet discontinued--patient taking Requip 1 mg TID now for PD @ 7A, 4P, 8P  Administration times and dosage for PD medications ordered appropriately Completed medication history as per group home paperwork  Sinemet discontinued--patient taking Requip 2 mg TID now for PD @ 7A, 4P, 8P  Administration times and dosage for PD medications ordered appropriately

## 2019-04-20 DIAGNOSIS — G93.41 METABOLIC ENCEPHALOPATHY: ICD-10-CM

## 2019-04-20 DIAGNOSIS — N40.1 BENIGN PROSTATIC HYPERPLASIA WITH LOWER URINARY TRACT SYMPTOMS: ICD-10-CM

## 2019-04-20 DIAGNOSIS — F89 UNSPECIFIED DISORDER OF PSYCHOLOGICAL DEVELOPMENT: ICD-10-CM

## 2019-04-20 DIAGNOSIS — R56.9 UNSPECIFIED CONVULSIONS: ICD-10-CM

## 2019-04-20 DIAGNOSIS — E87.0 HYPEROSMOLALITY AND HYPERNATREMIA: ICD-10-CM

## 2019-04-20 DIAGNOSIS — N39.0 URINARY TRACT INFECTION, SITE NOT SPECIFIED: ICD-10-CM

## 2019-04-20 DIAGNOSIS — E11.65 TYPE 2 DIABETES MELLITUS WITH HYPERGLYCEMIA: ICD-10-CM

## 2019-04-20 DIAGNOSIS — G20 PARKINSON'S DISEASE: ICD-10-CM

## 2019-04-20 LAB
ANION GAP SERPL CALC-SCNC: 8 MMOL/L — SIGNIFICANT CHANGE UP (ref 5–17)
BUN SERPL-MCNC: 16 MG/DL — SIGNIFICANT CHANGE UP (ref 7–23)
CALCIUM SERPL-MCNC: 7.1 MG/DL — LOW (ref 8.5–10.1)
CHLORIDE SERPL-SCNC: 109 MMOL/L — HIGH (ref 96–108)
CO2 SERPL-SCNC: 25 MMOL/L — SIGNIFICANT CHANGE UP (ref 22–31)
CREAT SERPL-MCNC: 0.6 MG/DL — SIGNIFICANT CHANGE UP (ref 0.5–1.3)
GLUCOSE BLDC GLUCOMTR-MCNC: 268 MG/DL — HIGH (ref 70–99)
GLUCOSE BLDC GLUCOMTR-MCNC: 293 MG/DL — HIGH (ref 70–99)
GLUCOSE BLDC GLUCOMTR-MCNC: 309 MG/DL — HIGH (ref 70–99)
GLUCOSE BLDC GLUCOMTR-MCNC: 319 MG/DL — HIGH (ref 70–99)
GLUCOSE BLDC GLUCOMTR-MCNC: 331 MG/DL — HIGH (ref 70–99)
GLUCOSE SERPL-MCNC: 322 MG/DL — HIGH (ref 70–99)
LEVETIRACETAM SERPL-MCNC: 24.3 MCG/ML — SIGNIFICANT CHANGE UP (ref 12–46)
POTASSIUM SERPL-MCNC: 3.4 MMOL/L — LOW (ref 3.5–5.3)
POTASSIUM SERPL-SCNC: 3.4 MMOL/L — LOW (ref 3.5–5.3)
SODIUM SERPL-SCNC: 142 MMOL/L — SIGNIFICANT CHANGE UP (ref 135–145)

## 2019-04-20 RX ORDER — INSULIN GLARGINE 100 [IU]/ML
10 INJECTION, SOLUTION SUBCUTANEOUS
Qty: 0 | Refills: 0 | Status: DISCONTINUED | OUTPATIENT
Start: 2019-04-20 | End: 2019-04-21

## 2019-04-20 RX ORDER — POTASSIUM CHLORIDE 20 MEQ
40 PACKET (EA) ORAL ONCE
Qty: 0 | Refills: 0 | Status: COMPLETED | OUTPATIENT
Start: 2019-04-20 | End: 2019-04-20

## 2019-04-20 RX ORDER — SODIUM CHLORIDE 9 MG/ML
1000 INJECTION, SOLUTION INTRAVENOUS
Qty: 0 | Refills: 0 | Status: DISCONTINUED | OUTPATIENT
Start: 2019-04-20 | End: 2019-04-23

## 2019-04-20 RX ADMIN — Medication 6: at 13:23

## 2019-04-20 RX ADMIN — INSULIN GLARGINE 10 UNIT(S): 100 INJECTION, SOLUTION SUBCUTANEOUS at 09:30

## 2019-04-20 RX ADMIN — Medication 1000 UNIT(S): at 13:22

## 2019-04-20 RX ADMIN — Medication 40 MILLIEQUIVALENT(S): at 13:22

## 2019-04-20 RX ADMIN — ARIPIPRAZOLE 30 MILLIGRAM(S): 15 TABLET ORAL at 21:15

## 2019-04-20 RX ADMIN — PANTOPRAZOLE SODIUM 40 MILLIGRAM(S): 20 TABLET, DELAYED RELEASE ORAL at 18:13

## 2019-04-20 RX ADMIN — INSULIN GLARGINE 10 UNIT(S): 100 INJECTION, SOLUTION SUBCUTANEOUS at 21:19

## 2019-04-20 RX ADMIN — DIVALPROEX SODIUM 1000 MILLIGRAM(S): 500 TABLET, DELAYED RELEASE ORAL at 21:16

## 2019-04-20 RX ADMIN — CEFTRIAXONE 1000 MILLIGRAM(S): 500 INJECTION, POWDER, FOR SOLUTION INTRAMUSCULAR; INTRAVENOUS at 18:12

## 2019-04-20 RX ADMIN — Medication 81 MILLIGRAM(S): at 13:22

## 2019-04-20 RX ADMIN — Medication 200 MILLIGRAM(S): at 13:21

## 2019-04-20 RX ADMIN — ROPINIROLE 2 MILLIGRAM(S): 8 TABLET, FILM COATED, EXTENDED RELEASE ORAL at 08:05

## 2019-04-20 RX ADMIN — ESCITALOPRAM OXALATE 40 MILLIGRAM(S): 10 TABLET, FILM COATED ORAL at 21:37

## 2019-04-20 RX ADMIN — HEPARIN SODIUM 5000 UNIT(S): 5000 INJECTION INTRAVENOUS; SUBCUTANEOUS at 18:13

## 2019-04-20 RX ADMIN — HEPARIN SODIUM 5000 UNIT(S): 5000 INJECTION INTRAVENOUS; SUBCUTANEOUS at 06:03

## 2019-04-20 RX ADMIN — ROPINIROLE 2 MILLIGRAM(S): 8 TABLET, FILM COATED, EXTENDED RELEASE ORAL at 21:14

## 2019-04-20 RX ADMIN — SODIUM CHLORIDE 50 MILLILITER(S): 9 INJECTION, SOLUTION INTRAVENOUS at 17:09

## 2019-04-20 RX ADMIN — ROPINIROLE 2 MILLIGRAM(S): 8 TABLET, FILM COATED, EXTENDED RELEASE ORAL at 17:08

## 2019-04-20 RX ADMIN — LEVETIRACETAM 750 MILLIGRAM(S): 250 TABLET, FILM COATED ORAL at 18:13

## 2019-04-20 RX ADMIN — Medication 6: at 17:07

## 2019-04-20 RX ADMIN — Medication 8: at 08:26

## 2019-04-20 RX ADMIN — PANTOPRAZOLE SODIUM 40 MILLIGRAM(S): 20 TABLET, DELAYED RELEASE ORAL at 06:03

## 2019-04-20 RX ADMIN — TAMSULOSIN HYDROCHLORIDE 0.8 MILLIGRAM(S): 0.4 CAPSULE ORAL at 21:14

## 2019-04-20 RX ADMIN — Medication 325 MILLIGRAM(S): at 13:22

## 2019-04-20 RX ADMIN — LEVETIRACETAM 750 MILLIGRAM(S): 250 TABLET, FILM COATED ORAL at 06:03

## 2019-04-20 RX ADMIN — SODIUM CHLORIDE 100 MILLILITER(S): 9 INJECTION, SOLUTION INTRAVENOUS at 06:04

## 2019-04-20 RX ADMIN — LORATADINE 10 MILLIGRAM(S): 10 TABLET ORAL at 13:22

## 2019-04-21 LAB
ALBUMIN SERPL ELPH-MCNC: 2.3 G/DL — LOW (ref 3.3–5)
ALP SERPL-CCNC: 57 U/L — SIGNIFICANT CHANGE UP (ref 40–120)
ALT FLD-CCNC: 22 U/L — SIGNIFICANT CHANGE UP (ref 12–78)
ANION GAP SERPL CALC-SCNC: 6 MMOL/L — SIGNIFICANT CHANGE UP (ref 5–17)
ANION GAP SERPL CALC-SCNC: 8 MMOL/L — SIGNIFICANT CHANGE UP (ref 5–17)
AST SERPL-CCNC: 21 U/L — SIGNIFICANT CHANGE UP (ref 15–37)
BILIRUB SERPL-MCNC: 0.2 MG/DL — SIGNIFICANT CHANGE UP (ref 0.2–1.2)
BUN SERPL-MCNC: 14 MG/DL — SIGNIFICANT CHANGE UP (ref 7–23)
BUN SERPL-MCNC: 16 MG/DL — SIGNIFICANT CHANGE UP (ref 7–23)
CALCIUM SERPL-MCNC: 7.1 MG/DL — LOW (ref 8.5–10.1)
CALCIUM SERPL-MCNC: 7.3 MG/DL — LOW (ref 8.5–10.1)
CHLORIDE SERPL-SCNC: 108 MMOL/L — SIGNIFICANT CHANGE UP (ref 96–108)
CHLORIDE SERPL-SCNC: 109 MMOL/L — HIGH (ref 96–108)
CO2 SERPL-SCNC: 26 MMOL/L — SIGNIFICANT CHANGE UP (ref 22–31)
CO2 SERPL-SCNC: 29 MMOL/L — SIGNIFICANT CHANGE UP (ref 22–31)
CREAT SERPL-MCNC: 0.58 MG/DL — SIGNIFICANT CHANGE UP (ref 0.5–1.3)
CREAT SERPL-MCNC: 0.71 MG/DL — SIGNIFICANT CHANGE UP (ref 0.5–1.3)
GLUCOSE BLDC GLUCOMTR-MCNC: 123 MG/DL — HIGH (ref 70–99)
GLUCOSE BLDC GLUCOMTR-MCNC: 176 MG/DL — HIGH (ref 70–99)
GLUCOSE BLDC GLUCOMTR-MCNC: 233 MG/DL — HIGH (ref 70–99)
GLUCOSE BLDC GLUCOMTR-MCNC: 251 MG/DL — HIGH (ref 70–99)
GLUCOSE SERPL-MCNC: 251 MG/DL — HIGH (ref 70–99)
GLUCOSE SERPL-MCNC: 299 MG/DL — HIGH (ref 70–99)
HCT VFR BLD CALC: 33.9 % — LOW (ref 39–50)
HGB BLD-MCNC: 11.5 G/DL — LOW (ref 13–17)
MCHC RBC-ENTMCNC: 31.1 PG — SIGNIFICANT CHANGE UP (ref 27–34)
MCHC RBC-ENTMCNC: 33.9 GM/DL — SIGNIFICANT CHANGE UP (ref 32–36)
MCV RBC AUTO: 91.6 FL — SIGNIFICANT CHANGE UP (ref 80–100)
NRBC # BLD: 0 /100 WBCS — SIGNIFICANT CHANGE UP (ref 0–0)
PLATELET # BLD AUTO: 105 K/UL — LOW (ref 150–400)
POTASSIUM SERPL-MCNC: 3.4 MMOL/L — LOW (ref 3.5–5.3)
POTASSIUM SERPL-MCNC: 3.6 MMOL/L — SIGNIFICANT CHANGE UP (ref 3.5–5.3)
POTASSIUM SERPL-SCNC: 3.4 MMOL/L — LOW (ref 3.5–5.3)
POTASSIUM SERPL-SCNC: 3.6 MMOL/L — SIGNIFICANT CHANGE UP (ref 3.5–5.3)
PROT SERPL-MCNC: 5 GM/DL — LOW (ref 6–8.3)
RBC # BLD: 3.7 M/UL — LOW (ref 4.2–5.8)
RBC # FLD: 12.1 % — SIGNIFICANT CHANGE UP (ref 10.3–14.5)
SODIUM SERPL-SCNC: 143 MMOL/L — SIGNIFICANT CHANGE UP (ref 135–145)
SODIUM SERPL-SCNC: 143 MMOL/L — SIGNIFICANT CHANGE UP (ref 135–145)
WBC # BLD: 5.33 K/UL — SIGNIFICANT CHANGE UP (ref 3.8–10.5)
WBC # FLD AUTO: 5.33 K/UL — SIGNIFICANT CHANGE UP (ref 3.8–10.5)

## 2019-04-21 RX ORDER — INSULIN GLARGINE 100 [IU]/ML
15 INJECTION, SOLUTION SUBCUTANEOUS
Qty: 0 | Refills: 0 | Status: DISCONTINUED | OUTPATIENT
Start: 2019-04-21 | End: 2019-04-23

## 2019-04-21 RX ADMIN — Medication 4: at 13:01

## 2019-04-21 RX ADMIN — ESCITALOPRAM OXALATE 40 MILLIGRAM(S): 10 TABLET, FILM COATED ORAL at 22:51

## 2019-04-21 RX ADMIN — Medication 200 MILLIGRAM(S): at 12:58

## 2019-04-21 RX ADMIN — PANTOPRAZOLE SODIUM 40 MILLIGRAM(S): 20 TABLET, DELAYED RELEASE ORAL at 19:04

## 2019-04-21 RX ADMIN — TAMSULOSIN HYDROCHLORIDE 0.8 MILLIGRAM(S): 0.4 CAPSULE ORAL at 22:51

## 2019-04-21 RX ADMIN — LEVETIRACETAM 750 MILLIGRAM(S): 250 TABLET, FILM COATED ORAL at 19:04

## 2019-04-21 RX ADMIN — LEVETIRACETAM 750 MILLIGRAM(S): 250 TABLET, FILM COATED ORAL at 05:34

## 2019-04-21 RX ADMIN — CEFTRIAXONE 1000 MILLIGRAM(S): 500 INJECTION, POWDER, FOR SOLUTION INTRAMUSCULAR; INTRAVENOUS at 19:04

## 2019-04-21 RX ADMIN — Medication 81 MILLIGRAM(S): at 12:58

## 2019-04-21 RX ADMIN — ARIPIPRAZOLE 30 MILLIGRAM(S): 15 TABLET ORAL at 22:51

## 2019-04-21 RX ADMIN — Medication 6: at 09:07

## 2019-04-21 RX ADMIN — HEPARIN SODIUM 5000 UNIT(S): 5000 INJECTION INTRAVENOUS; SUBCUTANEOUS at 05:35

## 2019-04-21 RX ADMIN — PANTOPRAZOLE SODIUM 40 MILLIGRAM(S): 20 TABLET, DELAYED RELEASE ORAL at 05:35

## 2019-04-21 RX ADMIN — HEPARIN SODIUM 5000 UNIT(S): 5000 INJECTION INTRAVENOUS; SUBCUTANEOUS at 19:03

## 2019-04-21 RX ADMIN — LORATADINE 10 MILLIGRAM(S): 10 TABLET ORAL at 12:58

## 2019-04-21 RX ADMIN — Medication 325 MILLIGRAM(S): at 12:58

## 2019-04-21 RX ADMIN — ROPINIROLE 2 MILLIGRAM(S): 8 TABLET, FILM COATED, EXTENDED RELEASE ORAL at 05:35

## 2019-04-21 RX ADMIN — Medication 1000 UNIT(S): at 12:58

## 2019-04-21 RX ADMIN — ROPINIROLE 2 MILLIGRAM(S): 8 TABLET, FILM COATED, EXTENDED RELEASE ORAL at 22:51

## 2019-04-21 RX ADMIN — INSULIN GLARGINE 15 UNIT(S): 100 INJECTION, SOLUTION SUBCUTANEOUS at 22:52

## 2019-04-21 RX ADMIN — DIVALPROEX SODIUM 1000 MILLIGRAM(S): 500 TABLET, DELAYED RELEASE ORAL at 22:51

## 2019-04-21 RX ADMIN — ROPINIROLE 2 MILLIGRAM(S): 8 TABLET, FILM COATED, EXTENDED RELEASE ORAL at 19:04

## 2019-04-21 RX ADMIN — INSULIN GLARGINE 10 UNIT(S): 100 INJECTION, SOLUTION SUBCUTANEOUS at 09:07

## 2019-04-21 NOTE — PROGRESS NOTE ADULT - PROBLEM SELECTOR PROBLEM 1
Benign prostatic hyperplasia with weak urinary stream
Encephalopathy, metabolic
Encephalopathy, metabolic

## 2019-04-22 LAB
CULTURE RESULTS: SIGNIFICANT CHANGE UP
CULTURE RESULTS: SIGNIFICANT CHANGE UP
GLUCOSE BLDC GLUCOMTR-MCNC: 142 MG/DL — HIGH (ref 70–99)
GLUCOSE BLDC GLUCOMTR-MCNC: 184 MG/DL — HIGH (ref 70–99)
GLUCOSE BLDC GLUCOMTR-MCNC: 209 MG/DL — HIGH (ref 70–99)
GLUCOSE BLDC GLUCOMTR-MCNC: 257 MG/DL — HIGH (ref 70–99)
HCT VFR BLD CALC: 37.3 % — LOW (ref 39–50)
HGB BLD-MCNC: 12.5 G/DL — LOW (ref 13–17)
MCHC RBC-ENTMCNC: 30.7 PG — SIGNIFICANT CHANGE UP (ref 27–34)
MCHC RBC-ENTMCNC: 33.5 GM/DL — SIGNIFICANT CHANGE UP (ref 32–36)
MCV RBC AUTO: 91.6 FL — SIGNIFICANT CHANGE UP (ref 80–100)
NRBC # BLD: 0 /100 WBCS — SIGNIFICANT CHANGE UP (ref 0–0)
PLATELET # BLD AUTO: 123 K/UL — LOW (ref 150–400)
RBC # BLD: 4.07 M/UL — LOW (ref 4.2–5.8)
RBC # FLD: 12.3 % — SIGNIFICANT CHANGE UP (ref 10.3–14.5)
SPECIMEN SOURCE: SIGNIFICANT CHANGE UP
SPECIMEN SOURCE: SIGNIFICANT CHANGE UP
WBC # BLD: 5.37 K/UL — SIGNIFICANT CHANGE UP (ref 3.8–10.5)
WBC # FLD AUTO: 5.37 K/UL — SIGNIFICANT CHANGE UP (ref 3.8–10.5)

## 2019-04-22 RX ORDER — CEFUROXIME AXETIL 250 MG
500 TABLET ORAL EVERY 12 HOURS
Qty: 0 | Refills: 0 | Status: DISCONTINUED | OUTPATIENT
Start: 2019-04-22 | End: 2019-04-23

## 2019-04-22 RX ADMIN — INSULIN GLARGINE 15 UNIT(S): 100 INJECTION, SOLUTION SUBCUTANEOUS at 21:11

## 2019-04-22 RX ADMIN — INSULIN GLARGINE 15 UNIT(S): 100 INJECTION, SOLUTION SUBCUTANEOUS at 08:59

## 2019-04-22 RX ADMIN — ROPINIROLE 2 MILLIGRAM(S): 8 TABLET, FILM COATED, EXTENDED RELEASE ORAL at 05:49

## 2019-04-22 RX ADMIN — SODIUM CHLORIDE 50 MILLILITER(S): 9 INJECTION, SOLUTION INTRAVENOUS at 18:57

## 2019-04-22 RX ADMIN — Medication 1000 UNIT(S): at 12:57

## 2019-04-22 RX ADMIN — KETOCONAZOLE 1 APPLICATION(S): 20 AEROSOL, FOAM TOPICAL at 21:07

## 2019-04-22 RX ADMIN — ARIPIPRAZOLE 30 MILLIGRAM(S): 15 TABLET ORAL at 21:01

## 2019-04-22 RX ADMIN — LEVETIRACETAM 750 MILLIGRAM(S): 250 TABLET, FILM COATED ORAL at 17:50

## 2019-04-22 RX ADMIN — Medication 500 MILLIGRAM(S): at 16:34

## 2019-04-22 RX ADMIN — HEPARIN SODIUM 5000 UNIT(S): 5000 INJECTION INTRAVENOUS; SUBCUTANEOUS at 17:53

## 2019-04-22 RX ADMIN — LORATADINE 10 MILLIGRAM(S): 10 TABLET ORAL at 12:23

## 2019-04-22 RX ADMIN — Medication 4: at 13:03

## 2019-04-22 RX ADMIN — ROPINIROLE 2 MILLIGRAM(S): 8 TABLET, FILM COATED, EXTENDED RELEASE ORAL at 16:35

## 2019-04-22 RX ADMIN — Medication 81 MILLIGRAM(S): at 12:23

## 2019-04-22 RX ADMIN — TAMSULOSIN HYDROCHLORIDE 0.8 MILLIGRAM(S): 0.4 CAPSULE ORAL at 21:07

## 2019-04-22 RX ADMIN — Medication 200 MILLIGRAM(S): at 12:23

## 2019-04-22 RX ADMIN — PANTOPRAZOLE SODIUM 40 MILLIGRAM(S): 20 TABLET, DELAYED RELEASE ORAL at 05:49

## 2019-04-22 RX ADMIN — ESCITALOPRAM OXALATE 40 MILLIGRAM(S): 10 TABLET, FILM COATED ORAL at 21:00

## 2019-04-22 RX ADMIN — HEPARIN SODIUM 5000 UNIT(S): 5000 INJECTION INTRAVENOUS; SUBCUTANEOUS at 05:49

## 2019-04-22 RX ADMIN — DIVALPROEX SODIUM 1000 MILLIGRAM(S): 500 TABLET, DELAYED RELEASE ORAL at 21:00

## 2019-04-22 RX ADMIN — PANTOPRAZOLE SODIUM 40 MILLIGRAM(S): 20 TABLET, DELAYED RELEASE ORAL at 17:50

## 2019-04-22 RX ADMIN — ROPINIROLE 2 MILLIGRAM(S): 8 TABLET, FILM COATED, EXTENDED RELEASE ORAL at 20:59

## 2019-04-22 RX ADMIN — Medication 325 MILLIGRAM(S): at 12:23

## 2019-04-22 RX ADMIN — LEVETIRACETAM 750 MILLIGRAM(S): 250 TABLET, FILM COATED ORAL at 05:49

## 2019-04-22 RX ADMIN — Medication 2: at 17:51

## 2019-04-22 NOTE — PROGRESS NOTE ADULT - ASSESSMENT
69 year old M with PMH of DM on metformin, glimepiride seizure disorder, Hyponatremia, Parkinson's from [facility] group home presents with altered mental status and hyperglycemia.     #Metabolic Encephalopathy:    Mental status improved.    Suspect multifactorial due to hyperglycemia/ UTI/ hypernatremia.    Follow.      #Hypernatremia:    Na improved to 147 from 159.  Hypovolemic hypernatremia.    Cont 1/2 NSS.    Renal f/u.     #UTI:    Urine culture with >3 organisms.    Cont Rocephin and plan to tx for 7 days.      #Urinary Retention:    Voiding trial in am.    Urology consult pending.      #DM with hyperglycemia:    Hold metformin/ glimepiride.    Lantus/ sliding scale.      #Lactic Acidosis:   No evidence of sepsis.    Secondary to metformin.    Repeat negative.      # PRASAD, Pre renal 2/2 dehydration:   Improved.  Cont IVF.      # Parkinson's  - Continue home medications    #Seizure d/o:    cont home meds.      #) DVT ppx  - Heparin q12h
- BGM's remain high. Will increase lantus to 10 units bid  - supplement K+  - mental status improved  - Na+ improved with hydration  - will decrease IVF  - repeat labs in am
- BGM's remain high. Will increase lantus to 15 units bid  - K+ improved  - mental status improved  - Na+ improved with hydration  - will decrease IVF  - can transfer to med-surg  - repeat labs in am  - dvt proph
69 year old M with PMH of DM on metformin, glimeperide, seizure disorder, Hyponatremia, Parkinson's from [facility] group home presents with altered mental status and hyperglycemia. Patient was noted at the group home to be fatigued and "not himself". As per the aid, patient is usually active and can walk on his own. Patient appeared tired and was not interacting well as usual. Patient was also noted to have an elevated blood sugar level. Aid states that when he has a level of > 240, that is requirement for evaluation in the ED. Patient reportedly has had well controlled blood sugars with around 170s on 4/14/19. Patient has not had any cough, fever, chills, diarrhea, dysuria, hematuria, ear pain, sorethroat, wheezing, shortness of breath. Patient was not complaining of any symptoms. Patient currently denies any complaints. Aid states he is now acting like himself.  Above from chart:  Pts aide at bedside  discussed pts water ingestion habits, she states pt does not request water  unless offered my not drink  pt denies being thirsty although  he is hypernatremic  mental state at baseline]  no new events  NS administerd initially due to hypovolemia  now switched to 1/2 NS  K 3.5  will replace w oral kcl    4/19  K replaced yesterday  on 1/2 NS  await am labs to make changes to ivf  d/w Pts aide to encourage water intake
69 year old M with PMH of DM on metformin, glimepiride seizure disorder, Hyponatremia, Parkinson's from [facility] group home presents with altered mental status and hyperglycemia.     #Metabolic Encephalopathy:    Mental status improved.    Suspect multifactorial due to hyperglycemia/ UTI/ hypernatremia.    Follow.      #Hypernatremia:    Na improved to 143 from 159.  Hypovolemic hypernatremia.    Cont 1/2 NSS.    Renal f/u.   improved     #UTI:    Urine culture with >3 organisms.    Cont Rocephin and plan to tx for 7 days.    switched to PO ceftin     #Urinary Retention:    no acute interventions need at present per urology     #DM with hyperglycemia:    Hold metformin/ glimepiride.    Lantus/ sliding scale.      #Lactic Acidosis:   No evidence of sepsis.    Secondary to metformin.    Repeat negative.      # PRASAD, Pre renal 2/2 dehydration:   Improved.  Cont IVF.      # Parkinson's  - Continue home medications    #Seizure d/o:    cont home meds.      # DVT ppx  - Heparin q12h
69 year old M with PMH of DM on metformin, glimepiride seizure disorder, Hyponatremia, Parkinson's from [facility] group home presents with altered mental status and hyperglycemia.     #Metabolic Encephalopathy:    Mental status improved.    Suspect multifactorial due to hyperglycemia/ UTI/ hypernatremia.    Follow.      #Hypernatremia:    Na improved to 147 from 159.  Hypovolemic hypernatremia.    Cont 1/2 NSS.    Renal f/u.   improved     #UTI:    Urine culture with >3 organisms.    Cont Rocephin and plan to tx for 7 days.    switch to PO ceftin     #Urinary Retention:    Voiding trial in am.    no acute interventions need at present per urology     #DM with hyperglycemia:    Hold metformin/ glimepiride.    Lantus/ sliding scale.      #Lactic Acidosis:   No evidence of sepsis.    Secondary to metformin.    Repeat negative.      # PRASAD, Pre renal 2/2 dehydration:   Improved.  Cont IVF.      # Parkinson's  - Continue home medications    #Seizure d/o:    cont home meds.      #) DVT ppx  - Heparin q12h
69 year old M with PMH of DM on metformin, glimepiride seizure disorder, Hyponatremia, Parkinson's from [facility] group home presents with altered mental status and hyperglycemia.     #Metabolic Encephalopathy:    Mental status improved.    Suspect multifactorial due to hyperglycemia/ UTI/ hypernatremia.    Follow.      #Hypernatremia:    Na improved to 153.  Hypovolemic hypernatremia.    Cont 1/2 NSS.      #DM with hyperglycemia:    Hold metformin/ glimepiride.    Lantus/ sliding scale.      #Lactic Acidosis:   No evidence of sepsis.    Secondary to metformin.    Repeat negative.        #) PRASAD, Pre renal 2/2 dehydration:   Improved.  Cont IVF.      #) Parkinson's  - Continue home medications    #Seizure d/o:    cont home meds.      #) DVT ppx  - Heparin q12h
A/P Patient ia a 69 male who is presenting with hyperglycemia and a possible UTI ve PNA    Suggest  Patient does not require the ICU at this time  Continue hydration as he is hypernatremic with hypotonic saline.    Patient lactate is rising, likely from sepsis, and will need continued hydration and serial measurements  Would consider changing to broader coverage like Zosyn  I D/W the hospitalist Dr. Ware who informed me the patient is a DNR/DNI and is confirming with the group home  Patient does not require the ICU at this time  Called the hospitalist to up date    Please recall if his condition changes

## 2019-04-22 NOTE — DIETITIAN INITIAL EVALUATION ADULT. - PERTINENT LABORATORY DATA
04-21 Na143 mmol/L Glu 251 mg/dL<H> K+ 3.4 mmol/L<L> Cr  0.58 mg/dL BUN 14 mg/dL Phos n/a   Alb 2.3 g/dL<L> PAB n/a

## 2019-04-22 NOTE — DIETITIAN INITIAL EVALUATION ADULT. - OTHER INFO
pt seen as LOS: 70yo male with PMH of DM on metformin, glumeperide, seizure disorder, hyponatremia, parkinsons from [facility] group home p/w fatigue.  Pt admitted with AMS 2/2 hyperglycemia, hypernatremia, and UTI.  Upon visit, pt appears well nourished.  Pt with breakfast tray, >75% consumed.  Aide from group home at bedside reports pt usually has good appetite, consuming ground diet.  Aide also reported no wt change noted by group home.  No food alelrgies.  (+) trace Lt/Rt foot edema.  BM (+) 4/19.  clyde score of 14, stage 1 PU on b/l heels.  labs reviewed; hypokalemia with A1C of 7.1 0n 3/1. consider checking A1C.  RECOMMENDATIONS: 1) encourage adequate protein intake with each meal (as pt has good appetite, oral supplement is not required at this time) 2) add MVI with minerals daily to ensure 100% RDI met 3) monitor PO intake/tolerance.

## 2019-04-22 NOTE — PHYSICAL THERAPY INITIAL EVALUATION ADULT - IMPAIRMENTS FOUND, PT EVAL
ROM/cognitive impairment/gait, locomotion, and balance/muscle strength/posture/poor safety awareness

## 2019-04-22 NOTE — PROGRESS NOTE ADULT - PROVIDER SPECIALTY LIST ADULT
Hospitalist
Nephrology
Pulmonology
Urology
Hospitalist
Critical Care

## 2019-04-22 NOTE — PHYSICAL THERAPY INITIAL EVALUATION ADULT - GENERAL OBSERVATIONS, REHAB EVAL
Pt received supine in bed in a crouched position, NAD, + Wall Monitors, 1:1 aide from group home present

## 2019-04-22 NOTE — PROGRESS NOTE ADULT - SUBJECTIVE AND OBJECTIVE BOX
69 year old M with PMH of DM on metformin, glimeperide, seizure disorder, Hyponatremia, Parkinson's from [facility] group home presents with altered mental status and hyperglycemia. Patient was noted at the group home to be fatigued and "not himself".  As per the aid, patient is usually active and can walk on his own.  Patient appeared tired and was not interacting well as usual. Patient was also noted to have an elevated blood sugar level. Aid states that when he has a level of > 240, that is requirement for evaluation in the ED.  Patient reportedly has had well controlled blood sugars with around 170s on 4/14/19. Patient has not had any cough, fever, chills, diarrhea, dysuria, hematuria, ear pain, sore throat, wheezing, shortness of breath. Patient was not complaining of any symptoms. Patient currently denies any complaints. Aid states he is now acting like himself.    ED Intervention: 3L of NS, 10 units Insulin R SC.    4/18:  Patient seen.  Feeling better.  More awake.  D/w group home aide.    4/19:  Pt seen.  Sleeping.  Meza in-- awaiting urology eval.  Eating.      Review of system- Rest of the review of system are negative except mentioned in HPI.      Vital Signs Last 24 Hrs  T(C): 35.9 (19 Apr 2019 16:23), Max: 37.7 (19 Apr 2019 04:34)  T(F): 96.6 (19 Apr 2019 16:23), Max: 99.8 (19 Apr 2019 04:34)  HR: 91 (19 Apr 2019 18:00) (61 - 91)  BP: 104/62 (19 Apr 2019 18:00) (93/59 - 126/67)  BP(mean): 71 (19 Apr 2019 18:00) (64 - 82)  RR: 25 (19 Apr 2019 18:00) (15 - 28)  SpO2: 92% (19 Apr 2019 18:00) (92% - 97%)    PHYSICAL EXAM:  GENERAL: NAD  NERVOUS SYSTEM:  Alert & Oriented X3, non- focal exam, Motor Strength 5/5 B/L upper and lower extremities; DTRs 2+ intact and symmetric  HEAD:  Atraumatic, Normocephalic  EYES: EOMI, PERRLA, conjunctiva and sclera clear  HEENT: Moist mucous membranes  NECK: Supple, No JVD  CHEST/LUNG: Clear to auscultation bilaterally; No rales, no rhonchi, no wheezing, or rubs  HEART: Regular rate and rhythm; No murmurs, rubs, or gallops  ABDOMEN: Soft, Nontender, Nondistended; Bowel sounds present  GENITOURINARY- Voiding, no suprapubic tenderness  EXTREMITIES:  2+ Peripheral Pulses, No clubbing, cyanosis, or edema  MUSCULOSKELETAL:- No muscle tenderness, Muscle tone normal, No joint tenderness, no Joint swelling, Joint range of motion-normal  SKIN-no rash, no lesion    04-19    147<H>  |  114<H>  |  14  ----------------------------<  283<H>  3.5   |  24  |  0.75    Ca    7.5<L>      19 Apr 2019 10:19  Phos  2.6     04-19    TPro  x   /  Alb  2.4<L>  /  TBili  x   /  DBili  x   /  AST  x   /  ALT  x   /  AlkPhos  x   04-19                LIVER FUNCTIONS - ( 19 Apr 2019 10:19 )  Alb: 2.4 g/dL / Pro: x     / ALK PHOS: x     / ALT: x     / AST: x     / GGT: x           MEDICATIONS  (STANDING):  ARIPiprazole 30 milliGRAM(s) Oral at bedtime  aspirin enteric coated 81 milliGRAM(s) Oral daily  cefTRIAXone Injectable.      cefTRIAXone Injectable. 1000 milliGRAM(s) IV Push every 24 hours  cholecalciferol 1000 Unit(s) Oral daily  dextrose 5%. 1000 milliLiter(s) (50 mL/Hr) IV Continuous <Continuous>  dextrose 50% Injectable 12.5 Gram(s) IV Push once  dextrose 50% Injectable 25 Gram(s) IV Push once  dextrose 50% Injectable 25 Gram(s) IV Push once  diVALproex ER 1000 milliGRAM(s) Oral at bedtime  docusate sodium Oral Tab/Cap - Peds 200 milliGRAM(s) Oral daily  escitalopram 40 milliGRAM(s) Oral at bedtime  ferrous sulfate Oral Tab/Cap - Peds 325 milliGRAM(s) Oral daily  heparin  Injectable 5000 Unit(s) SubCutaneous every 12 hours  insulin glargine Injectable (LANTUS) 10 Unit(s) SubCutaneous every morning  insulin lispro (HumaLOG) corrective regimen sliding scale   SubCutaneous three times a day before meals  ketoconazole 2% Shampoo 1 Application(s) Topical <User Schedule>  levETIRAcetam 750 milliGRAM(s) Oral two times a day  loratadine 10 milliGRAM(s) Oral daily  pantoprazole    Tablet 40 milliGRAM(s) Oral two times a day  rOPINIRole 2 milliGRAM(s) Oral three times a day  sodium chloride 0.45%. 1000 milliLiter(s) (100 mL/Hr) IV Continuous <Continuous>  tamsulosin 0.8 milliGRAM(s) Oral at bedtime    MEDICATIONS  (PRN):  acetaminophen   Tablet .. 650 milliGRAM(s) Oral every 8 hours PRN Temp greater or equal to 38C (100.4F), Mild Pain (1 - 3)  dextrose 40% Gel 15 Gram(s) Oral once PRN Blood Glucose LESS THAN 70 milliGRAM(s)/deciliter  glucagon  Injectable 1 milliGRAM(s) IntraMuscular once PRN Glucose LESS THAN 70 milligrams/deciliter  zinc oxide 20% Ointment 1 Application(s) Topical daily PRN Rash
69 year old M with PMH of DM on metformin, glimeperide, seizure disorder, Hyponatremia, Parkinson's from [facility] group home presents with altered mental status and hyperglycemia. Patient was noted at the group home to be fatigued and "not himself".  As per the aid, patient is usually active and can walk on his own.  Patient appeared tired and was not interacting well as usual. Patient was also noted to have an elevated blood sugar level. Aid states that when he has a level of > 240, that is requirement for evaluation in the ED.  Patient reportedly has had well controlled blood sugars with around 170s on 19. Patient has not had any cough, fever, chills, diarrhea, dysuria, hematuria, ear pain, sore throat, wheezing, shortness of breath. Patient was not complaining of any symptoms. Patient currently denies any complaints. Aid states he is now acting like himself.    ED Intervention: 3L of NS, 10 units Insulin R SC.    :  Patient seen.  Feeling better.  More awake.  D/w group home aide.      Review of system- Rest of the review of system are negative except mentioned in HPI.      OBJECTIVE:   Vital Signs Last 24 Hrs  T(C): 36.3 (2019 16:09), Max: 36.6 (2019 05:43)  T(F): 97.3 (2019 16:09), Max: 97.8 (2019 05:43)  HR: 63 (2019 16:00) (55 - 77)  BP: 95/56 (2019 16:00) (88/57 - 123/83)  BP(mean): 66 (2019 16:00) (55 - 94)  RR: 24 (2019 16:00) (15 - 27)  SpO2: 93% (2019 06:00) (91% - 96%)    PHYSICAL EXAM:  GENERAL: NAD  NERVOUS SYSTEM:  Alert & Oriented X3, non- focal exam, Motor Strength 5/5 B/L upper and lower extremities; DTRs 2+ intact and symmetric  HEAD:  Atraumatic, Normocephalic  EYES: EOMI, PERRLA, conjunctiva and sclera clear  HEENT: Moist mucous membranes  NECK: Supple, No JVD  CHEST/LUNG: Clear to auscultation bilaterally; No rales, no rhonchi, no wheezing, or rubs  HEART: Regular rate and rhythm; No murmurs, rubs, or gallops  ABDOMEN: Soft, Nontender, Nondistended; Bowel sounds present  GENITOURINARY- Voiding, no suprapubic tenderness  EXTREMITIES:  2+ Peripheral Pulses, No clubbing, cyanosis, or edema  MUSCULOSKELETAL:- No muscle tenderness, Muscle tone normal, No joint tenderness, no Joint swelling, Joint range of motion-normal  SKIN-no rash, no lesion    LABS:      153<H>  |  121<H>  |  27<H>  ----------------------------<  197<H>  3.5   |  25  |  0.93    Ca    8.0<L>      2019 06:50  Mg     2.2         TPro  6.0  /  Alb  3.1<L>  /  TBili  0.4  /  DBili  x   /  AST  <3<L>  /  ALT  <6<L>  /  AlkPhos  70                 13.0   8.89  )-----------( 142      ( 2019 08:32 )             40.0   CARDIAC MARKERS ( 2019 08:32 )  <0.015 ng/mL / x     / 44 U/L / x     / x        LIVER FUNCTIONS - ( 2019 08:32 )  Alb: 3.1 g/dL / Pro: 6.0 gm/dL / ALK PHOS: 70 U/L / ALT: <6 U/L / AST: <3 U/L / GGT: x           PT/INR - ( 2019 08:32 )   PT: 11.9 sec;   INR: 1.07 ratio     PTT - ( 2019 08:32 )  PTT:33.3 sec    Urinalysis Basic - ( 2019 09:37 )    Color: Yellow / Appearance: Clear / S.010 / pH: x  Gluc: x / Ketone: Large  / Bili: Negative / Urobili: Negative mg/dL   Blood: x / Protein: Negative mg/dL / Nitrite: Negative   Leuk Esterase: Small / RBC: 0-2 /HPF / WBC 11-25   Sq Epi: x / Non Sq Epi: Occasional / Bacteria: Few    CAPILLARY BLOOD GLUCOSE      POCT Blood Glucose.: 171 mg/dL (2019 17:25)  POCT Blood Glucose.: 152 mg/dL (2019 12:47)  POCT Blood Glucose.: 205 mg/dL (2019 09:32)  POCT Blood Glucose.: 200 mg/dL (2019 08:06)  POCT Blood Glucose.: 200 mg/dL (2019 18:28)      MEDICATIONS  (STANDING):  ARIPiprazole 30 milliGRAM(s) Oral at bedtime  aspirin enteric coated 81 milliGRAM(s) Oral daily  cefTRIAXone   IVPB      cholecalciferol 1000 Unit(s) Oral daily  dextrose 5%. 1000 milliLiter(s) (50 mL/Hr) IV Continuous <Continuous>  dextrose 50% Injectable 12.5 Gram(s) IV Push once  dextrose 50% Injectable 25 Gram(s) IV Push once  dextrose 50% Injectable 25 Gram(s) IV Push once  diVALproex ER 1000 milliGRAM(s) Oral at bedtime  docusate sodium Oral Tab/Cap - Peds 200 milliGRAM(s) Oral daily  escitalopram 40 milliGRAM(s) Oral at bedtime  ferrous sulfate Oral Tab/Cap - Peds 325 milliGRAM(s) Oral daily  heparin  Injectable 5000 Unit(s) SubCutaneous every 12 hours  insulin glargine Injectable (LANTUS) 10 Unit(s) SubCutaneous every morning  insulin lispro (HumaLOG) corrective regimen sliding scale   SubCutaneous three times a day before meals  ketoconazole 2% Shampoo 1 Application(s) Topical <User Schedule>  levETIRAcetam 750 milliGRAM(s) Oral two times a day  loratadine 10 milliGRAM(s) Oral daily  pantoprazole    Tablet 40 milliGRAM(s) Oral two times a day  rOPINIRole 2 milliGRAM(s) Oral three times a day  sodium chloride 0.45%. 1000 milliLiter(s) (100 mL/Hr) IV Continuous <Continuous>  tamsulosin 0.8 milliGRAM(s) Oral at bedtime    MEDICATIONS  (PRN):  acetaminophen   Tablet .. 650 milliGRAM(s) Oral every 8 hours PRN Temp greater or equal to 38C (100.4F), Mild Pain (1 - 3)  dextrose 40% Gel 15 Gram(s) Oral once PRN Blood Glucose LESS THAN 70 milliGRAM(s)/deciliter  glucagon  Injectable 1 milliGRAM(s) IntraMuscular once PRN Glucose LESS THAN 70 milligrams/deciliter  zinc oxide 20% Ointment 1 Application(s) Topical daily PRN Rash
CHIEF COMPLAINT:Retention    HISTORY OF PRESENT ILLNESS:Meza out, seems to be doing well    PAST MEDICAL & SURGICAL HISTORY:  Osteoporosis  Incontinence  BPH (benign prostatic hyperplasia)  Chronic GERD  Hyponatremia  SIADH (syndrome of inappropriate ADH production)  Diabetes  Developmental disability  Hypertension  Seizures  Parkinson disease  H/O foot surgery      REVIEW OF SYSTEMS:    CONSTITUTIONAL: No weakness, fevers or chills/Mentally challenged  EYES/ENT: No visual changes;  No vertigo or throat pain   NECK: No pain or stiffness  RESPIRATORY: No cough, wheezing, hemoptysis; No shortness of breath  CARDIOVASCULAR: No chest pain or palpitations  GASTROINTESTINAL: No abdominal or epigastric pain. No nausea, vomiting, or hematemesis; No diarrhea or constipation. No melena or hematochezia.  GENITOURINARY: No dysuria, frequency or hematuria  NEUROLOGICAL: No numbness or weakness/mentally challenged  SKIN: No itching, burning, rashes, or lesions   All other review of systems is negative unless indicated above.    MEDICATIONS  (STANDING):  ARIPiprazole 30 milliGRAM(s) Oral at bedtime  aspirin enteric coated 81 milliGRAM(s) Oral daily  cefTRIAXone Injectable.      cefTRIAXone Injectable. 1000 milliGRAM(s) IV Push every 24 hours  cholecalciferol 1000 Unit(s) Oral daily  dextrose 5%. 1000 milliLiter(s) (50 mL/Hr) IV Continuous <Continuous>  dextrose 50% Injectable 12.5 Gram(s) IV Push once  dextrose 50% Injectable 25 Gram(s) IV Push once  dextrose 50% Injectable 25 Gram(s) IV Push once  diVALproex ER 1000 milliGRAM(s) Oral at bedtime  docusate sodium Oral Tab/Cap - Peds 200 milliGRAM(s) Oral daily  escitalopram 40 milliGRAM(s) Oral at bedtime  ferrous sulfate Oral Tab/Cap - Peds 325 milliGRAM(s) Oral daily  heparin  Injectable 5000 Unit(s) SubCutaneous every 12 hours  insulin glargine Injectable (LANTUS) 10 Unit(s) SubCutaneous two times a day  insulin lispro (HumaLOG) corrective regimen sliding scale   SubCutaneous three times a day before meals  ketoconazole 2% Shampoo 1 Application(s) Topical <User Schedule>  levETIRAcetam 750 milliGRAM(s) Oral two times a day  loratadine 10 milliGRAM(s) Oral daily  pantoprazole    Tablet 40 milliGRAM(s) Oral two times a day  potassium chloride   Powder 40 milliEquivalent(s) Oral once  rOPINIRole 2 milliGRAM(s) Oral three times a day  sodium chloride 0.45%. 1000 milliLiter(s) (50 mL/Hr) IV Continuous <Continuous>  tamsulosin 0.8 milliGRAM(s) Oral at bedtime    MEDICATIONS  (PRN):  acetaminophen   Tablet .. 650 milliGRAM(s) Oral every 8 hours PRN Temp greater or equal to 38C (100.4F), Mild Pain (1 - 3)  dextrose 40% Gel 15 Gram(s) Oral once PRN Blood Glucose LESS THAN 70 milliGRAM(s)/deciliter  glucagon  Injectable 1 milliGRAM(s) IntraMuscular once PRN Glucose LESS THAN 70 milligrams/deciliter  zinc oxide 20% Ointment 1 Application(s) Topical daily PRN Rash      Allergies    No Known Allergies    Intolerances        SOCIAL HISTORY:    FAMILY HISTORY:  No pertinent family history in first degree relatives      Vital Signs Last 24 Hrs  T(C): 36.6 (20 Apr 2019 04:43), Max: 37 (19 Apr 2019 12:07)  T(F): 97.8 (20 Apr 2019 04:43), Max: 98.6 (19 Apr 2019 12:07)  HR: 76 (20 Apr 2019 05:00) (67 - 91)  BP: 111/82 (20 Apr 2019 05:00) (92/61 - 113/75)  BP(mean): 89 (20 Apr 2019 05:00) (64 - 89)  RR: 20 (19 Apr 2019 21:00) (14 - 25)  SpO2: 95% (20 Apr 2019 04:00) (91% - 95%)    PHYSICAL EXAM:    Constitutional: NAD, well-developed/Mentally challenged  HEENT: MAR, EOMI, Normal Hearing, MMM  Neck: No LAD, No JVD  Back: Normal spine flexure, No CVA tenderness  Respiratory: CTAB   Cardiovascular: S1 and S2, RRR, no M/G/R  Abd: BS+, soft, NT/ND, No CVAT  : Normal phallus,open meatus,bilateral descended testes, no masses  MAYLIN: Normal prostate, no masses  Extremities: No peripheral edema  Vascular: 2+ peripheral pulses  Neurological: A/O x 3, no focal deficits  Psychiatric: Normal mood, normal affect  Musculoskeletal: 5/5 strength b/l upper and lower extremities  Skin: No rashes    LABS:    04-20    142  |  109<H>  |  16  ----------------------------<  322<H>  3.4<L>   |  25  |  0.60    Ca    7.1<L>      20 Apr 2019 06:29  Phos  2.6     04-19    TPro  x   /  Alb  2.4<L>  /  TBili  x   /  DBili  x   /  AST  x   /  ALT  x   /  AlkPhos  x   04-19        Urine Culture:     RADIOLOGY & ADDITIONAL STUDIES:
INTERVAL HPI/OVERNIGHT EVENTS:  69 year old M with PMH of DM on metformin, glimeperide, seizure disorder, Hyponatremia, Parkinson's from [facility] group home presents with altered mental status and hyperglycemia. Patient was noted at the group home to be fatigued and "not himself".  As per the aid, patient is usually active and can walk on his own.  Patient appeared tired and was not interacting well as usual. Patient was also noted to have an elevated blood sugar level. Aid states that when he has a level of > 240, that is requirement for evaluation in the ED.  Patient reportedly has had well controlled blood sugars with around 170s on 4/14/19. Patient has not had any cough, fever, chills, diarrhea, dysuria, hematuria, ear pain, sore throat, wheezing, shortness of breath. Patient was not complaining of any symptoms. Patient currently denies any complaints. Aid states he is now acting like himself.    ED Intervention: 3L of NS, 10 units Insulin R SC.    4/22/19- patient seen and examined at bedside. more awake and responsive per aide. denies pain or discomfort.     MEDICATIONS  (STANDING):  ARIPiprazole 30 milliGRAM(s) Oral at bedtime  aspirin enteric coated 81 milliGRAM(s) Oral daily  cholecalciferol 1000 Unit(s) Oral daily  dextrose 5%. 1000 milliLiter(s) (50 mL/Hr) IV Continuous <Continuous>  dextrose 50% Injectable 12.5 Gram(s) IV Push once  dextrose 50% Injectable 25 Gram(s) IV Push once  dextrose 50% Injectable 25 Gram(s) IV Push once  diVALproex ER 1000 milliGRAM(s) Oral at bedtime  docusate sodium Oral Tab/Cap - Peds 200 milliGRAM(s) Oral daily  escitalopram 40 milliGRAM(s) Oral at bedtime  ferrous sulfate Oral Tab/Cap - Peds 325 milliGRAM(s) Oral daily  heparin  Injectable 5000 Unit(s) SubCutaneous every 12 hours  insulin glargine Injectable (LANTUS) 15 Unit(s) SubCutaneous two times a day  insulin lispro (HumaLOG) corrective regimen sliding scale   SubCutaneous three times a day before meals  ketoconazole 2% Shampoo 1 Application(s) Topical <User Schedule>  levETIRAcetam 750 milliGRAM(s) Oral two times a day  loratadine 10 milliGRAM(s) Oral daily  pantoprazole    Tablet 40 milliGRAM(s) Oral two times a day  rOPINIRole 2 milliGRAM(s) Oral three times a day  sodium chloride 0.45%. 1000 milliLiter(s) (50 mL/Hr) IV Continuous <Continuous>  tamsulosin 0.8 milliGRAM(s) Oral at bedtime    MEDICATIONS  (PRN):  acetaminophen   Tablet .. 650 milliGRAM(s) Oral every 8 hours PRN Temp greater or equal to 38C (100.4F), Mild Pain (1 - 3)  dextrose 40% Gel 15 Gram(s) Oral once PRN Blood Glucose LESS THAN 70 milliGRAM(s)/deciliter  glucagon  Injectable 1 milliGRAM(s) IntraMuscular once PRN Glucose LESS THAN 70 milligrams/deciliter  zinc oxide 20% Ointment 1 Application(s) Topical daily PRN Rash      Allergies    No Known Allergies    Intolerances        ROS limited 2/2 to mental status, denies shortness of breath or pain.     Vital Signs Last 24 Hrs  T(C): 36.8 (22 Apr 2019 13:30), Max: 36.8 (22 Apr 2019 12:18)  T(F): 98.2 (22 Apr 2019 13:30), Max: 98.3 (22 Apr 2019 12:18)  HR: 82 (22 Apr 2019 13:30) (64 - 90)  BP: 102/69 (22 Apr 2019 13:30) (93/63 - 143/63)  BP(mean): 73 (22 Apr 2019 08:00) (70 - 88)  RR: 16 (22 Apr 2019 13:30) (16 - 16)  SpO2: 98% (22 Apr 2019 13:30) (92% - 98%)    Physical Exam:  General: WN/WD NAD  Neurology: A&Ox1, nonfocal, MCKNIGHT x 4  Respiratory: CTA B/L  CV: RRR, S1S2  Abdominal: Soft, NT, ND +BS   Extremities: No edema, + peripheral pulses      LABS:                        12.5   5.37  )-----------( 123      ( 22 Apr 2019 06:06 )             37.3     04-21    143  |  109<H>  |  14  ----------------------------<  251<H>  3.4<L>   |  26  |  0.58    Ca    7.3<L>      21 Apr 2019 12:35    TPro  5.0<L>  /  Alb  2.3<L>  /  TBili  0.2  /  DBili  x   /  AST  21  /  ALT  22  /  AlkPhos  57  04-21          RADIOLOGY & ADDITIONAL TESTS:
INTERVAL HPI/OVERNIGHT EVENTS:  69 year old M with PMH of DM on metformin, glimeperide, seizure disorder, Hyponatremia, Parkinson's from [facility] group home presents with altered mental status and hyperglycemia. Patient was noted at the group home to be fatigued and "not himself".  As per the aid, patient is usually active and can walk on his own.  Patient appeared tired and was not interacting well as usual. Patient was also noted to have an elevated blood sugar level. Aid states that when he has a level of > 240, that is requirement for evaluation in the ED.  Patient reportedly has had well controlled blood sugars with around 170s on 4/14/19. Patient has not had any cough, fever, chills, diarrhea, dysuria, hematuria, ear pain, sore throat, wheezing, shortness of breath. Patient was not complaining of any symptoms. Patient currently denies any complaints. Aid states he is now acting like himself.    ED Intervention: 3L of NS, 10 units Insulin R SC.    4/22/19- patient seen and examined at bedside. more awake and responsive per aide. denies pain or discomfort.     MEDICATIONS  (STANDING):  ARIPiprazole 30 milliGRAM(s) Oral at bedtime  aspirin enteric coated 81 milliGRAM(s) Oral daily  cholecalciferol 1000 Unit(s) Oral daily  dextrose 5%. 1000 milliLiter(s) (50 mL/Hr) IV Continuous <Continuous>  dextrose 50% Injectable 12.5 Gram(s) IV Push once  dextrose 50% Injectable 25 Gram(s) IV Push once  dextrose 50% Injectable 25 Gram(s) IV Push once  diVALproex ER 1000 milliGRAM(s) Oral at bedtime  docusate sodium Oral Tab/Cap - Peds 200 milliGRAM(s) Oral daily  escitalopram 40 milliGRAM(s) Oral at bedtime  ferrous sulfate Oral Tab/Cap - Peds 325 milliGRAM(s) Oral daily  heparin  Injectable 5000 Unit(s) SubCutaneous every 12 hours  insulin glargine Injectable (LANTUS) 15 Unit(s) SubCutaneous two times a day  insulin lispro (HumaLOG) corrective regimen sliding scale   SubCutaneous three times a day before meals  ketoconazole 2% Shampoo 1 Application(s) Topical <User Schedule>  levETIRAcetam 750 milliGRAM(s) Oral two times a day  loratadine 10 milliGRAM(s) Oral daily  pantoprazole    Tablet 40 milliGRAM(s) Oral two times a day  rOPINIRole 2 milliGRAM(s) Oral three times a day  sodium chloride 0.45%. 1000 milliLiter(s) (50 mL/Hr) IV Continuous <Continuous>  tamsulosin 0.8 milliGRAM(s) Oral at bedtime    MEDICATIONS  (PRN):  acetaminophen   Tablet .. 650 milliGRAM(s) Oral every 8 hours PRN Temp greater or equal to 38C (100.4F), Mild Pain (1 - 3)  dextrose 40% Gel 15 Gram(s) Oral once PRN Blood Glucose LESS THAN 70 milliGRAM(s)/deciliter  glucagon  Injectable 1 milliGRAM(s) IntraMuscular once PRN Glucose LESS THAN 70 milligrams/deciliter  zinc oxide 20% Ointment 1 Application(s) Topical daily PRN Rash      Allergies    No Known Allergies    Intolerances        ROS limited 2/2 to mental status, denies shortness of breath or pain.     Vital Signs Last 24 Hrs  T(C): 36.8 (22 Apr 2019 13:30), Max: 36.8 (22 Apr 2019 12:18)  T(F): 98.2 (22 Apr 2019 13:30), Max: 98.3 (22 Apr 2019 12:18)  HR: 82 (22 Apr 2019 13:30) (64 - 90)  BP: 102/69 (22 Apr 2019 13:30) (93/63 - 143/63)  BP(mean): 73 (22 Apr 2019 08:00) (70 - 88)  RR: 16 (22 Apr 2019 13:30) (16 - 16)  SpO2: 98% (22 Apr 2019 13:30) (92% - 98%)      General: WN/WD NAD  Neurology: A&Ox1, nonfocal, MCKNIGHT x 4  Respiratory: CTA B/L  CV: RRR, S1S2, no murmur   Abdominal: Soft, NT, ND +BS   Extremities: No edema, + peripheral pulses      LABS:                        12.5   5.37  )-----------( 123      ( 22 Apr 2019 06:06 )             37.3     04-21    143  |  109<H>  |  14  ----------------------------<  251<H>  3.4<L>   |  26  |  0.58    Ca    7.3<L>      21 Apr 2019 12:35    TPro  5.0<L>  /  Alb  2.3<L>  /  TBili  0.2  /  DBili  x   /  AST  21  /  ALT  22  /  AlkPhos  57  04-21          RADIOLOGY & ADDITIONAL TESTS:
NEPHROLOGY CONSULT  HPI:  69 year old M with PMH of DM on metformin, glimeperide, seizure disorder, Hyponatremia, Parkinson's from [facility] group home presents with altered mental status and hyperglycemia. Patient was noted at the group home to be fatigued and "not himself". As per the aid, patient is usually active and can walk on his own. Patient appeared tired and was not interacting well as usual. Patient was also noted to have an elevated blood sugar level. Aid states that when he has a level of > 240, that is requirement for evaluation in the ED. Patient reportedly has had well controlled blood sugars with around 170s on 4/14/19. Patient has not had any cough, fever, chills, diarrhea, dysuria, hematuria, ear pain, sorethroat, wheezing, shortness of breath. Patient was not complaining of any symptoms. Patient currently denies any complaints. Aid states he is now acting like himself.  Above from chart:  Pts aide at bedside  discussed pts water ingestion habits, she states pt does not request water  unless offered my not drink  pt denies being thirsty although  he is hypernatremic  mental state at baseline]  no new events  NS administerd initially due to hypovolemia  now switched to 1/2 NS    4/19  repeat labs pending  mental status improved  no new events   vvs  d/w pts aide at bedside        FamHx non contributory to current condition. (17 Apr 2019 13:11)      PAST MEDICAL & SURGICAL HISTORY:  Osteoporosis  Incontinence  BPH (benign prostatic hyperplasia)  Chronic GERD  Hyponatremia  SIADH (syndrome of inappropriate ADH production)  Diabetes  Developmental disability  Hypertension  Seizures  Parkinson disease  H/O foot surgery      FAMILY HISTORY:  No pertinent family history in first degree relatives      MEDICATIONS  (STANDING):  ARIPiprazole 30 milliGRAM(s) Oral at bedtime  aspirin enteric coated 81 milliGRAM(s) Oral daily  cholecalciferol 1000 Unit(s) Oral daily  dextrose 5%. 1000 milliLiter(s) (50 mL/Hr) IV Continuous <Continuous>  dextrose 50% Injectable 12.5 Gram(s) IV Push once  dextrose 50% Injectable 25 Gram(s) IV Push once  dextrose 50% Injectable 25 Gram(s) IV Push once  diVALproex ER 1000 milliGRAM(s) Oral at bedtime  docusate sodium Oral Tab/Cap - Peds 200 milliGRAM(s) Oral daily  escitalopram 40 milliGRAM(s) Oral at bedtime  ferrous sulfate Oral Tab/Cap - Peds 325 milliGRAM(s) Oral daily  heparin  Injectable 5000 Unit(s) SubCutaneous every 12 hours  insulin glargine Injectable (LANTUS) 10 Unit(s) SubCutaneous every morning  insulin lispro (HumaLOG) corrective regimen sliding scale   SubCutaneous three times a day before meals  ketoconazole 2% Shampoo 1 Application(s) Topical <User Schedule>  levETIRAcetam 750 milliGRAM(s) Oral two times a day  loratadine 10 milliGRAM(s) Oral daily  pantoprazole    Tablet 40 milliGRAM(s) Oral two times a day  piperacillin/tazobactam IVPB. 3.375 Gram(s) IV Intermittent every 8 hours  rOPINIRole 2 milliGRAM(s) Oral three times a day  sodium chloride 0.45%. 1000 milliLiter(s) (100 mL/Hr) IV Continuous <Continuous>  tamsulosin 0.8 milliGRAM(s) Oral at bedtime    MEDICATIONS  (PRN):  acetaminophen   Tablet .. 650 milliGRAM(s) Oral every 8 hours PRN Temp greater or equal to 38C (100.4F), Mild Pain (1 - 3)  dextrose 40% Gel 15 Gram(s) Oral once PRN Blood Glucose LESS THAN 70 milliGRAM(s)/deciliter  glucagon  Injectable 1 milliGRAM(s) IntraMuscular once PRN Glucose LESS THAN 70 milligrams/deciliter  zinc oxide 20% Ointment 1 Application(s) Topical daily PRN Rash      Allergies    No Known Allergies    Intolerances          REVIEW OF SYSTEMS:    ROSEY    I&O's Detail    18 Apr 2019 07:01  -  19 Apr 2019 07:00  --------------------------------------------------------  IN:    sodium chloride 0.45%.: 2239 mL  Total IN: 2239 mL    OUT:    Voided: 2250 mL  Total OUT: 2250 mL    Total NET: -11 mL    ICU Vital Signs Last 24 Hrs  T(C): 37.7 (19 Apr 2019 04:34), Max: 37.7 (19 Apr 2019 04:34)  T(F): 99.8 (19 Apr 2019 04:34), Max: 99.8 (19 Apr 2019 04:34)  HR: 86 (19 Apr 2019 10:00) (58 - 86)  BP: 99/62 (19 Apr 2019 10:00) (92/54 - 126/67)  BP(mean): 70 (19 Apr 2019 10:00) (59 - 82)  ABP: --  ABP(mean): --  RR: 24 (19 Apr 2019 10:00) (17 - 28)  SpO2: 95% (19 Apr 2019 10:00) (93% - 97%)          General:  Alert, answers question today, minimal response yes / no    Neuro:    HEENT:  No JVD, no masses, Eyes anicteric, No carotid bruits.No lymphadenopathy,    Cardiovascular:  Regular rate and rhythm, with normal S1 and S2. No murmurs, rubs,  or gallops. No JVD.     Lungs:  clear. no rales, no wheezing, .    Abdomen:  Normoactive bowel sounds. Soft, flat, non-tender, and non-distended.  No hepatosplenomegaly, positive bowel sounds    Skin:  Warm, dry, well-perfused. No rashes or other lesions.     Extremities:  2+ pulses in upper and lower extremities.    LABS:    04-18    153<H>  |  121<H>  |  27<H>  ----------------------------<  197<H>  3.5   |  25  |  0.93    Ca    8.0<L>      18 Apr 2019 06:50
Subjective:  awake and responsive  no distress  aide at bedside    MEDICATIONS  (STANDING):  ARIPiprazole 30 milliGRAM(s) Oral at bedtime  aspirin enteric coated 81 milliGRAM(s) Oral daily  cefTRIAXone Injectable.      cefTRIAXone Injectable. 1000 milliGRAM(s) IV Push every 24 hours  cholecalciferol 1000 Unit(s) Oral daily  dextrose 5%. 1000 milliLiter(s) (50 mL/Hr) IV Continuous <Continuous>  dextrose 50% Injectable 12.5 Gram(s) IV Push once  dextrose 50% Injectable 25 Gram(s) IV Push once  dextrose 50% Injectable 25 Gram(s) IV Push once  diVALproex ER 1000 milliGRAM(s) Oral at bedtime  docusate sodium Oral Tab/Cap - Peds 200 milliGRAM(s) Oral daily  escitalopram 40 milliGRAM(s) Oral at bedtime  ferrous sulfate Oral Tab/Cap - Peds 325 milliGRAM(s) Oral daily  heparin  Injectable 5000 Unit(s) SubCutaneous every 12 hours  insulin glargine Injectable (LANTUS) 10 Unit(s) SubCutaneous two times a day  insulin lispro (HumaLOG) corrective regimen sliding scale   SubCutaneous three times a day before meals  ketoconazole 2% Shampoo 1 Application(s) Topical <User Schedule>  levETIRAcetam 750 milliGRAM(s) Oral two times a day  loratadine 10 milliGRAM(s) Oral daily  pantoprazole    Tablet 40 milliGRAM(s) Oral two times a day  potassium chloride   Powder 40 milliEquivalent(s) Oral once  rOPINIRole 2 milliGRAM(s) Oral three times a day  sodium chloride 0.45%. 1000 milliLiter(s) (100 mL/Hr) IV Continuous <Continuous>  tamsulosin 0.8 milliGRAM(s) Oral at bedtime    MEDICATIONS  (PRN):  acetaminophen   Tablet .. 650 milliGRAM(s) Oral every 8 hours PRN Temp greater or equal to 38C (100.4F), Mild Pain (1 - 3)  dextrose 40% Gel 15 Gram(s) Oral once PRN Blood Glucose LESS THAN 70 milliGRAM(s)/deciliter  glucagon  Injectable 1 milliGRAM(s) IntraMuscular once PRN Glucose LESS THAN 70 milligrams/deciliter  zinc oxide 20% Ointment 1 Application(s) Topical daily PRN Rash      Allergies    No Known Allergies    Intolerances        REVIEW OF SYSTEMS:    CONSTITUTIONAL:  As per HPI.  HEENT:  Eyes:  No diplopia or blurred vision. ENT:  No earache, sore throat or runny nose.  CARDIOVASCULAR:  No pressure, squeezing, tightness, heaviness or aching about the chest, neck, axilla or epigastrium.  RESPIRATORY:  No cough, shortness of breath, PND or orthopnea.  GASTROINTESTINAL:  No nausea, vomiting or diarrhea.  GENITOURINARY:  No dysuria, frequency or urgency.  MUSCULOSKELETAL:  no joint pain, deformity, tenderness  EXTREMITIES: no clubbing cyanosis,edema  SKIN:  No change in skin, hair or nails.  NEUROLOGIC:  No paresthesias, fasciculations, seizures or weakness.  PSYCHIATRIC:  No disorder of thought or mood.  ENDOCRINE:  No heat or cold intolerance, polyuria or polydipsia.  HEMATOLOGICAL:  No easy bruising or bleedings:    Vital Signs Last 24 Hrs  T(C): 36.6 (20 Apr 2019 04:43), Max: 37 (19 Apr 2019 12:07)  T(F): 97.8 (20 Apr 2019 04:43), Max: 98.6 (19 Apr 2019 12:07)  HR: 76 (20 Apr 2019 05:00) (67 - 91)  BP: 111/82 (20 Apr 2019 05:00) (92/61 - 113/75)  BP(mean): 89 (20 Apr 2019 05:00) (64 - 89)  RR: 20 (19 Apr 2019 21:00) (14 - 25)  SpO2: 95% (20 Apr 2019 04:00) (91% - 96%)    PHYSICAL EXAMINATION:  SKIN: no rashes  HEAD: NC/AT  EYES: PERRLA, EOMI  EARS: TM's intact  NOSE: no abnormalities  NECK:  Supple. No lymphadenopathy. Jugular venous pressure not elevated. Carotids equal.   HEART:  NSR  CHEST: bilat ronchi  ABDOMEN:  Soft and nontender.   EXTREMITIES:  no C/C/E  NEURO: AAO x 3, no focal deficts       LABS:    04-20    142  |  109<H>  |  16  ----------------------------<  322<H>  3.4<L>   |  25  |  0.60    Ca    7.1<L>      20 Apr 2019 06:29  Phos  2.6     04-19    TPro  x   /  Alb  2.4<L>  /  TBili  x   /  DBili  x   /  AST  x   /  ALT  x   /  AlkPhos  x   04-19          RADIOLOGY & ADDITIONAL TESTS:
Subjective:  no distress    MEDICATIONS  (STANDING):  ARIPiprazole 30 milliGRAM(s) Oral at bedtime  aspirin enteric coated 81 milliGRAM(s) Oral daily  cefTRIAXone Injectable.      cefTRIAXone Injectable. 1000 milliGRAM(s) IV Push every 24 hours  cholecalciferol 1000 Unit(s) Oral daily  dextrose 5%. 1000 milliLiter(s) (50 mL/Hr) IV Continuous <Continuous>  dextrose 50% Injectable 12.5 Gram(s) IV Push once  dextrose 50% Injectable 25 Gram(s) IV Push once  dextrose 50% Injectable 25 Gram(s) IV Push once  diVALproex ER 1000 milliGRAM(s) Oral at bedtime  docusate sodium Oral Tab/Cap - Peds 200 milliGRAM(s) Oral daily  escitalopram 40 milliGRAM(s) Oral at bedtime  ferrous sulfate Oral Tab/Cap - Peds 325 milliGRAM(s) Oral daily  heparin  Injectable 5000 Unit(s) SubCutaneous every 12 hours  insulin glargine Injectable (LANTUS) 10 Unit(s) SubCutaneous two times a day  insulin lispro (HumaLOG) corrective regimen sliding scale   SubCutaneous three times a day before meals  ketoconazole 2% Shampoo 1 Application(s) Topical <User Schedule>  levETIRAcetam 750 milliGRAM(s) Oral two times a day  loratadine 10 milliGRAM(s) Oral daily  pantoprazole    Tablet 40 milliGRAM(s) Oral two times a day  rOPINIRole 2 milliGRAM(s) Oral three times a day  sodium chloride 0.45%. 1000 milliLiter(s) (50 mL/Hr) IV Continuous <Continuous>  tamsulosin 0.8 milliGRAM(s) Oral at bedtime    MEDICATIONS  (PRN):  acetaminophen   Tablet .. 650 milliGRAM(s) Oral every 8 hours PRN Temp greater or equal to 38C (100.4F), Mild Pain (1 - 3)  dextrose 40% Gel 15 Gram(s) Oral once PRN Blood Glucose LESS THAN 70 milliGRAM(s)/deciliter  glucagon  Injectable 1 milliGRAM(s) IntraMuscular once PRN Glucose LESS THAN 70 milligrams/deciliter  zinc oxide 20% Ointment 1 Application(s) Topical daily PRN Rash      Allergies    No Known Allergies    Intolerances        REVIEW OF SYSTEMS:    CONSTITUTIONAL:  As per HPI.  HEENT:  Eyes:  No diplopia or blurred vision. ENT:  No earache, sore throat or runny nose.  CARDIOVASCULAR:  No pressure, squeezing, tightness, heaviness or aching about the chest, neck, axilla or epigastrium.  RESPIRATORY:  No cough, shortness of breath, PND or orthopnea.  GASTROINTESTINAL:  No nausea, vomiting or diarrhea.  GENITOURINARY:  No dysuria, frequency or urgency.  MUSCULOSKELETAL:  no joint pain, deformity, tenderness  EXTREMITIES: no clubbing cyanosis,edema  SKIN:  No change in skin, hair or nails.  NEUROLOGIC:  No paresthesias, fasciculations, seizures or weakness.  PSYCHIATRIC:  No disorder of thought or mood.  ENDOCRINE:  No heat or cold intolerance, polyuria or polydipsia.  HEMATOLOGICAL:  No easy bruising or bleedings:    Vital Signs Last 24 Hrs  T(C): 37.3 (21 Apr 2019 06:32), Max: 37.3 (21 Apr 2019 06:32)  T(F): 99.1 (21 Apr 2019 06:32), Max: 99.1 (21 Apr 2019 06:32)  HR: 75 (21 Apr 2019 09:00) (68 - 97)  BP: 103/76 (21 Apr 2019 09:00) (93/64 - 113/69)  BP(mean): 83 (21 Apr 2019 09:00) (71 - 85)  RR: --  SpO2: 96% (21 Apr 2019 09:00) (93% - 100%)    PHYSICAL EXAMINATION:  SKIN: no rashes  HEAD: NC/AT  EYES: PERRLA, EOMI  EARS: TM's intact  NOSE: no abnormalities  NECK:  Supple. No lymphadenopathy. Jugular venous pressure not elevated. Carotids equal.   HEART:   The cardiac impulse has a normal quality. Reg., Nl S1 and S2.  There are no murmurs, rubs or gallops noted  CHEST:  Chest is clear to auscultation. Normal respiratory effort.  ABDOMEN:  Soft and nontender.   EXTREMITIES:  no C/C/E  NEURO: AAO x 3, no focal deficts       LABS:                        11.5   5.33  )-----------( 105      ( 21 Apr 2019 06:22 )             33.9     04-21    143  |  108  |  16  ----------------------------<  299<H>  3.6   |  29  |  0.71    Ca    7.1<L>      21 Apr 2019 06:22  Phos  2.6     04-19    TPro  x   /  Alb  2.4<L>  /  TBili  x   /  DBili  x   /  AST  x   /  ALT  x   /  AlkPhos  x   04-19          RADIOLOGY & ADDITIONAL TESTS:
CC: Called to see the patient for hypotension    HPI:  69 year old M with PMH of DM on metformin, glimeperide, seizure disorder, Hyponatremia, Parkinson's from [facility] group home presents with altered mental status and hyperglycemia. Patient was noted at the group home to be fatigued and "not himself". As per the aid, patient is usually active and can walk on his own. Patient appeared tired and was not interacting well as usual. Patient was also noted to have an elevated blood sugar level.  I was called by the hospitalist because the patients BP dropped to the 80.  Upon my arrival the patient was in bed with his aid at his side.  She said he is more lethergic than normal but he did wake up when his name was called.  Bp was also stable with a MAP of 72 upon my arrival.      ED Intervention: 3L of NS, 10 units Insulin R SC.    FamHx non contributory to current condition. (2019 13:11)       PAST MEDICAL & SURGICAL HISTORY:  Osteoporosis  Incontinence  BPH (benign prostatic hyperplasia)  Chronic GERD  Hyponatremia  SIADH (syndrome of inappropriate ADH production)  Diabetes  Developmental disability  Hypertension  Seizures  Parkinson disease  H/O foot surgery      FAMILY HISTORY:  No pertinent family history in first degree relatives      Social Hx:    Allergies    No Known Allergies    Intolerances          Weight (kg): 80.7 ( @ 08:07)    ICU Vital Signs Last 24 Hrs  T(C): 36.6 (2019 16:16), Max: 37.2 (2019 08:27)  T(F): 97.9 (2019 16:16), Max: 99 (2019 08:27)  HR: 65 (2019 18:26) (63 - 88)  BP: 104/62 (2019 18:26) (90/58 - 120/73)  BP(mean): --  ABP: --  ABP(mean): --  RR: 16 (2019 18:26) (16 - 20)  SpO2: 96% (2019 18:26) (94% - 98%)          I&O's Summary                            13.0   8.89  )-----------( 142      ( 2019 08:32 )             40.0       04-17    158<H>  |  128<H>  |  29<H>  ----------------------------<  235<H>  3.6   |  24  |  1.03    Ca    8.2<L>      2019 17:30  Mg     2.2     -    TPro  6.0  /  Alb  3.1<L>  /  TBili  0.4  /  DBili  x   /  AST  <3<L>  /  ALT  <6<L>  /  AlkPhos  70  04-      CARDIAC MARKERS ( 2019 08:32 )  <0.015 ng/mL / x     / 44 U/L / x     / x                Urinalysis Basic - ( 2019 09:37 )    Color: Yellow / Appearance: Clear / S.010 / pH: x  Gluc: x / Ketone: Large  / Bili: Negative / Urobili: Negative mg/dL   Blood: x / Protein: Negative mg/dL / Nitrite: Negative   Leuk Esterase: Small / RBC: 0-2 /HPF / WBC 11-25   Sq Epi: x / Non Sq Epi: Occasional / Bacteria: Few        MEDICATIONS  (STANDING):  ARIPiprazole 30 milliGRAM(s) Oral at bedtime  aspirin enteric coated 81 milliGRAM(s) Oral daily  calcium carbonate 1250 mG  + Vitamin D (OsCal 500 + D) 1 Tablet(s) Oral once  cefTRIAXone Injectable.      cholecalciferol 1000 Unit(s) Oral daily  dextrose 5%. 1000 milliLiter(s) (50 mL/Hr) IV Continuous <Continuous>  dextrose 50% Injectable 12.5 Gram(s) IV Push once  dextrose 50% Injectable 25 Gram(s) IV Push once  dextrose 50% Injectable 25 Gram(s) IV Push once  diVALproex ER 1000 milliGRAM(s) Oral at bedtime  docusate sodium Oral Tab/Cap - Peds 200 milliGRAM(s) Oral daily  escitalopram 40 milliGRAM(s) Oral at bedtime  ferrous sulfate Oral Tab/Cap - Peds 325 milliGRAM(s) Oral daily  heparin  Injectable 5000 Unit(s) SubCutaneous every 12 hours  insulin glargine Injectable (LANTUS) 10 Unit(s) SubCutaneous every morning  insulin lispro (HumaLOG) corrective regimen sliding scale   SubCutaneous three times a day before meals  ketoconazole 2% Shampoo 1 Application(s) Topical <User Schedule>  levETIRAcetam 750 milliGRAM(s) Oral two times a day  loratadine 10 milliGRAM(s) Oral daily  oxybutynin 10 milliGRAM(s) Oral daily  pantoprazole    Tablet 40 milliGRAM(s) Oral two times a day  sodium chloride 0.45%. 1000 milliLiter(s) (100 mL/Hr) IV Continuous <Continuous>  tamsulosin 0.8 milliGRAM(s) Oral at bedtime    MEDICATIONS  (PRN):  acetaminophen   Tablet .. 650 milliGRAM(s) Oral every 8 hours PRN Temp greater or equal to 38C (100.4F), Mild Pain (1 - 3)  dextrose 40% Gel 15 Gram(s) Oral once PRN Blood Glucose LESS THAN 70 milliGRAM(s)/deciliter  glucagon  Injectable 1 milliGRAM(s) IntraMuscular once PRN Glucose LESS THAN 70 milligrams/deciliter  zinc oxide 20% Ointment 1 Application(s) Topical daily PRN Rash      DVT Prophylaxis: [facility]    Advanced Directives:  Discussed with:    Visit Information:    ** Time is exclusive of billed procedures and/or teaching and/or routine family updates.

## 2019-04-22 NOTE — PROGRESS NOTE ADULT - REASON FOR ADMISSION
Hyperglycemia, AMS

## 2019-04-22 NOTE — PHYSICAL THERAPY INITIAL EVALUATION ADULT - IMPAIRMENTS CONTRIBUTING TO GAIT DEVIATIONS, PT EVAL
impaired balance/decreased flexibility/impaired motor control/narrow base of support/cognition/decreased strength

## 2019-04-22 NOTE — PHYSICAL THERAPY INITIAL EVALUATION ADULT - PERTINENT HX OF CURRENT PROBLEM, REHAB EVAL
69 year old M with PMH of DM on metformin, glimepiride seizure disorder, Hyponatremia, Parkinson's from [facility] group home presents with altered mental status and hyperglycemia. 69 year old M with PMH of DM on metformin, seizure disorder, Hyponatremia, Parkinson's from [facility] group home presents with altered mental status and hyperglycemia.

## 2019-04-22 NOTE — DIETITIAN INITIAL EVALUATION ADULT. - PERTINENT MEDS FT
MEDICATIONS  (STANDING):  ARIPiprazole 30 milliGRAM(s) Oral at bedtime  aspirin enteric coated 81 milliGRAM(s) Oral daily  cholecalciferol 1000 Unit(s) Oral daily  dextrose 5%. 1000 milliLiter(s) (50 mL/Hr) IV Continuous <Continuous>  dextrose 50% Injectable 12.5 Gram(s) IV Push once  dextrose 50% Injectable 25 Gram(s) IV Push once  dextrose 50% Injectable 25 Gram(s) IV Push once  diVALproex ER 1000 milliGRAM(s) Oral at bedtime  docusate sodium Oral Tab/Cap - Peds 200 milliGRAM(s) Oral daily  escitalopram 40 milliGRAM(s) Oral at bedtime  ferrous sulfate Oral Tab/Cap - Peds 325 milliGRAM(s) Oral daily  heparin  Injectable 5000 Unit(s) SubCutaneous every 12 hours  insulin glargine Injectable (LANTUS) 15 Unit(s) SubCutaneous two times a day  insulin lispro (HumaLOG) corrective regimen sliding scale   SubCutaneous three times a day before meals  ketoconazole 2% Shampoo 1 Application(s) Topical <User Schedule>  levETIRAcetam 750 milliGRAM(s) Oral two times a day  loratadine 10 milliGRAM(s) Oral daily  pantoprazole    Tablet 40 milliGRAM(s) Oral two times a day  rOPINIRole 2 milliGRAM(s) Oral three times a day  sodium chloride 0.45%. 1000 milliLiter(s) (50 mL/Hr) IV Continuous <Continuous>  tamsulosin 0.8 milliGRAM(s) Oral at bedtime    MEDICATIONS  (PRN):  acetaminophen   Tablet .. 650 milliGRAM(s) Oral every 8 hours PRN Temp greater or equal to 38C (100.4F), Mild Pain (1 - 3)  dextrose 40% Gel 15 Gram(s) Oral once PRN Blood Glucose LESS THAN 70 milliGRAM(s)/deciliter  glucagon  Injectable 1 milliGRAM(s) IntraMuscular once PRN Glucose LESS THAN 70 milligrams/deciliter  zinc oxide 20% Ointment 1 Application(s) Topical daily PRN Rash

## 2019-04-22 NOTE — PHYSICAL THERAPY INITIAL EVALUATION ADULT - DIAGNOSIS, PT EVAL
Altered Mental Status 2/2 Hyperglycemia, Hypernatremia, UTI, Lactic Acidosis, Hypernatremia 2/2 Dehydration:PRASAD, Pre renal 2/2 dehydration,,Parkinson's,

## 2019-04-23 ENCOUNTER — TRANSCRIPTION ENCOUNTER (OUTPATIENT)
Age: 69
End: 2019-04-23

## 2019-04-23 VITALS
OXYGEN SATURATION: 100 % | HEART RATE: 80 BPM | SYSTOLIC BLOOD PRESSURE: 96 MMHG | DIASTOLIC BLOOD PRESSURE: 60 MMHG | TEMPERATURE: 97 F | RESPIRATION RATE: 18 BRPM

## 2019-04-23 LAB
ANION GAP SERPL CALC-SCNC: 7 MMOL/L — SIGNIFICANT CHANGE UP (ref 5–17)
BUN SERPL-MCNC: 16 MG/DL — SIGNIFICANT CHANGE UP (ref 7–23)
CALCIUM SERPL-MCNC: 7.5 MG/DL — LOW (ref 8.5–10.1)
CHLORIDE SERPL-SCNC: 109 MMOL/L — HIGH (ref 96–108)
CO2 SERPL-SCNC: 27 MMOL/L — SIGNIFICANT CHANGE UP (ref 22–31)
CREAT SERPL-MCNC: 0.57 MG/DL — SIGNIFICANT CHANGE UP (ref 0.5–1.3)
GLUCOSE BLDC GLUCOMTR-MCNC: 148 MG/DL — HIGH (ref 70–99)
GLUCOSE BLDC GLUCOMTR-MCNC: 255 MG/DL — HIGH (ref 70–99)
GLUCOSE SERPL-MCNC: 166 MG/DL — HIGH (ref 70–99)
HCT VFR BLD CALC: 35 % — LOW (ref 39–50)
HGB BLD-MCNC: 11.7 G/DL — LOW (ref 13–17)
MCHC RBC-ENTMCNC: 30.5 PG — SIGNIFICANT CHANGE UP (ref 27–34)
MCHC RBC-ENTMCNC: 33.4 GM/DL — SIGNIFICANT CHANGE UP (ref 32–36)
MCV RBC AUTO: 91.4 FL — SIGNIFICANT CHANGE UP (ref 80–100)
NRBC # BLD: 0 /100 WBCS — SIGNIFICANT CHANGE UP (ref 0–0)
PLATELET # BLD AUTO: 121 K/UL — LOW (ref 150–400)
POTASSIUM SERPL-MCNC: 3.4 MMOL/L — LOW (ref 3.5–5.3)
POTASSIUM SERPL-SCNC: 3.4 MMOL/L — LOW (ref 3.5–5.3)
RBC # BLD: 3.83 M/UL — LOW (ref 4.2–5.8)
RBC # FLD: 12.4 % — SIGNIFICANT CHANGE UP (ref 10.3–14.5)
SODIUM SERPL-SCNC: 143 MMOL/L — SIGNIFICANT CHANGE UP (ref 135–145)
WBC # BLD: 5.51 K/UL — SIGNIFICANT CHANGE UP (ref 3.8–10.5)
WBC # FLD AUTO: 5.51 K/UL — SIGNIFICANT CHANGE UP (ref 3.8–10.5)

## 2019-04-23 RX ORDER — POTASSIUM CHLORIDE 20 MEQ
40 PACKET (EA) ORAL ONCE
Qty: 0 | Refills: 0 | Status: COMPLETED | OUTPATIENT
Start: 2019-04-23 | End: 2019-04-23

## 2019-04-23 RX ORDER — CEFUROXIME AXETIL 250 MG
1 TABLET ORAL
Qty: 2 | Refills: 0 | OUTPATIENT
Start: 2019-04-23 | End: 2019-04-23

## 2019-04-23 RX ORDER — OXYBUTYNIN CHLORIDE 5 MG
1 TABLET ORAL
Qty: 0 | Refills: 0 | COMMUNITY

## 2019-04-23 RX ORDER — SODIUM CHLORIDE 9 MG/ML
1 INJECTION INTRAMUSCULAR; INTRAVENOUS; SUBCUTANEOUS
Qty: 0 | Refills: 0 | COMMUNITY

## 2019-04-23 RX ORDER — CARBIDOPA AND LEVODOPA 25; 100 MG/1; MG/1
1 TABLET ORAL
Qty: 0 | Refills: 0 | COMMUNITY
Start: 2019-04-23

## 2019-04-23 RX ORDER — CARBIDOPA AND LEVODOPA 25; 100 MG/1; MG/1
1 TABLET ORAL DAILY
Qty: 0 | Refills: 0 | Status: DISCONTINUED | OUTPATIENT
Start: 2019-04-23 | End: 2019-04-23

## 2019-04-23 RX ORDER — ZINC OXIDE 200 MG/G
1 OINTMENT TOPICAL
Qty: 0 | Refills: 0 | COMMUNITY
Start: 2019-04-23

## 2019-04-23 RX ADMIN — LEVETIRACETAM 750 MILLIGRAM(S): 250 TABLET, FILM COATED ORAL at 06:04

## 2019-04-23 RX ADMIN — HEPARIN SODIUM 5000 UNIT(S): 5000 INJECTION INTRAVENOUS; SUBCUTANEOUS at 06:04

## 2019-04-23 RX ADMIN — INSULIN GLARGINE 15 UNIT(S): 100 INJECTION, SOLUTION SUBCUTANEOUS at 08:15

## 2019-04-23 RX ADMIN — Medication 6: at 12:10

## 2019-04-23 RX ADMIN — Medication 81 MILLIGRAM(S): at 11:55

## 2019-04-23 RX ADMIN — ROPINIROLE 2 MILLIGRAM(S): 8 TABLET, FILM COATED, EXTENDED RELEASE ORAL at 06:04

## 2019-04-23 RX ADMIN — Medication 325 MILLIGRAM(S): at 11:55

## 2019-04-23 RX ADMIN — Medication 200 MILLIGRAM(S): at 11:55

## 2019-04-23 RX ADMIN — Medication 40 MILLIEQUIVALENT(S): at 11:54

## 2019-04-23 RX ADMIN — Medication 500 MILLIGRAM(S): at 06:04

## 2019-04-23 RX ADMIN — PANTOPRAZOLE SODIUM 40 MILLIGRAM(S): 20 TABLET, DELAYED RELEASE ORAL at 06:04

## 2019-04-23 RX ADMIN — LORATADINE 10 MILLIGRAM(S): 10 TABLET ORAL at 11:55

## 2019-04-23 RX ADMIN — Medication 1000 UNIT(S): at 11:54

## 2019-04-23 NOTE — DISCHARGE NOTE PROVIDER - PROVIDER TOKENS
PROVIDER:[TOKEN:[[id number]],FOLLOWUP:[2 weeks]],FREE:[LAST:[PCP at facility],PHONE:[(   )    -],FAX:[(   )    -],FOLLOWUP:[1 week]],PROVIDER:[TOKEN:[[id number]],FOLLOWUP:[2 weeks]]

## 2019-04-23 NOTE — DISCHARGE NOTE NURSING/CASE MANAGEMENT/SOCIAL WORK - NSDCDPATPORTLINK_GEN_ALL_CORE
You can access the [facility], offered by [facility], by registering with the following website: [email or web address]

## 2019-04-23 NOTE — DISCHARGE NOTE PROVIDER - NSDCCPCAREPLAN_GEN_ALL_CORE_FT
PRINCIPAL DISCHARGE DIAGNOSIS  Diagnosis: Hyperglycemia  Assessment and Plan of Treatment: -Resolved  -Continue medications as prescribed      SECONDARY DISCHARGE DIAGNOSES  Diagnosis: Acute UTI  Assessment and Plan of Treatment: -Complete course of antibiotics  -Follow up with PCP within 1 week of discharge

## 2019-04-23 NOTE — DISCHARGE NOTE PROVIDER - CARE PROVIDER_API CALL
Chuck Graf (MD)  Urology  [address], [address] [place] [address]  Phone: [phone]  Fax: [phone]  Follow Up Time: 2 weeks    PCP at facility,   Phone: (   )    -  Fax: (   )    -  Follow Up Time: 1 week    Scott Ariza (DO)  Nephrology  [address], [address]  [place] [address]  Phone: [phone]  Fax: [phone]  Follow Up Time: 2 weeks

## 2019-04-23 NOTE — DISCHARGE NOTE PROVIDER - CARE PROVIDERS DIRECT ADDRESSES
,[email or web address],DirectAddress_Unknown,DirectAddress_Unknown

## 2019-04-23 NOTE — DISCHARGE NOTE PROVIDER - HOSPITAL COURSE
69 year old M with PMH of DM on metformin, glimeperide, seizure disorder, [facility]onatremia, [facility] presents with altered mental status and hyperglycemia. Patient was noted at the group home to be fatigued and "not himself".  As per the aid, patient is usually active and can walk on his own.  Patient appeared tired and was not interacting well as usual. Patient was also noted to have an elevated blood sugar level. Aid states that when he has a level of > 240, that is requirement for evaluation in the ED.  Patient reportedly has had well controlled blood sugars with around 170s on 4/14/19. Patient has not had any cough, fever, chills, diarrhea, dysuria, hematuria, ear pain, sore throat, wheezing, shortness of breath. Patient was not complaining of any symptoms. Patient currently denies any complaints. Aid states he is now acting like himself.        ED Intervention: 3L of NS, 10 units Insulin R SC.        4/22/19- patient seen and examined at bedside. more awake and responsive per aide. denies pain or discomfort.     4/23/19- Patient seen and examined at bedside. States he feels well, denies any pain/discomfort        Physical Exam:    General: WN/WD NAD    Neurology: A&Ox1, nonfocal, MCKNIGHT x 4    Respiratory: CTA B/L    CV: RRR, S1S2    Abdominal: Soft, NT, ND +BS     Extremities: No edema, + peripheral pulses            69 year old M with PMH of DM on metformin, glimepiride seizure disorder, Hyponatremia, [facility] presents with altered mental status and hyperglycemia.         #Metabolic Encephalopathy:      Mental status improved.      Suspect multifactorial due to hyperglycemia/ UTI/ hypernatremia.      Follow.          #Hypernatremia:      Na improved to 143 from 159.  Hypovolemic hypernatremia.      Cont 1/2 NSS.      Renal f/u.     improved         #UTI:      Urine culture with >3 organisms.      Cont Rocephin and plan to tx for 7 days.      switched to PO ceftin         #Urinary Retention:      no acute interventions need at present per urology         #DM with hyperglycemia:      Hold metformin/ glimepiride.      Lantus/ sliding scale.          #Lactic Acidosis:     No evidence of sepsis.      Secondary to metformin in setting of acute UTI      Resolved        # PRASAD, Pre renal 2/2 dehydration:     Improved.  encourage oral water intake         # Parkinson's    - Continue home medications        #Seizure d/o:      cont home meds.          Left message for sister-in-law- legal guardian- Tamiko    Total time spent on discharge: 40 minutes

## 2019-04-24 PROBLEM — N40.0 BENIGN PROSTATIC HYPERPLASIA WITHOUT LOWER URINARY TRACT SYMPTOMS: Chronic | Status: ACTIVE | Noted: 2019-04-17

## 2019-04-24 PROBLEM — R32 UNSPECIFIED URINARY INCONTINENCE: Chronic | Status: ACTIVE | Noted: 2019-04-17

## 2019-04-24 PROBLEM — E87.1 HYPO-OSMOLALITY AND HYPONATREMIA: Chronic | Status: ACTIVE | Noted: 2019-04-17

## 2019-04-24 PROBLEM — K21.9 GASTRO-ESOPHAGEAL REFLUX DISEASE WITHOUT ESOPHAGITIS: Chronic | Status: ACTIVE | Noted: 2019-04-17

## 2019-04-24 PROBLEM — M81.0 AGE-RELATED OSTEOPOROSIS WITHOUT CURRENT PATHOLOGICAL FRACTURE: Chronic | Status: ACTIVE | Noted: 2019-04-17

## 2019-04-25 ENCOUNTER — EMERGENCY (EMERGENCY)
Facility: HOSPITAL | Age: 69
LOS: 0 days | Discharge: HOME | End: 2019-04-25
Attending: STUDENT IN AN ORGANIZED HEALTH CARE EDUCATION/TRAINING PROGRAM | Admitting: STUDENT IN AN ORGANIZED HEALTH CARE EDUCATION/TRAINING PROGRAM
Payer: MEDICARE

## 2019-04-25 VITALS
DIASTOLIC BLOOD PRESSURE: 69 MMHG | OXYGEN SATURATION: 97 % | RESPIRATION RATE: 19 BRPM | SYSTOLIC BLOOD PRESSURE: 107 MMHG | HEART RATE: 73 BPM

## 2019-04-25 VITALS
RESPIRATION RATE: 19 BRPM | SYSTOLIC BLOOD PRESSURE: 99 MMHG | DIASTOLIC BLOOD PRESSURE: 79 MMHG | HEIGHT: 67 IN | WEIGHT: 184.97 LBS | HEART RATE: 76 BPM | TEMPERATURE: 98 F

## 2019-04-25 DIAGNOSIS — Z98.890 OTHER SPECIFIED POSTPROCEDURAL STATES: Chronic | ICD-10-CM

## 2019-04-25 DIAGNOSIS — E87.1 HYPO-OSMOLALITY AND HYPONATREMIA: ICD-10-CM

## 2019-04-25 DIAGNOSIS — K21.9 GASTRO-ESOPHAGEAL REFLUX DISEASE WITHOUT ESOPHAGITIS: ICD-10-CM

## 2019-04-25 DIAGNOSIS — G20 PARKINSON'S DISEASE: ICD-10-CM

## 2019-04-25 DIAGNOSIS — Z79.84 LONG TERM (CURRENT) USE OF ORAL HYPOGLYCEMIC DRUGS: ICD-10-CM

## 2019-04-25 DIAGNOSIS — I10 ESSENTIAL (PRIMARY) HYPERTENSION: ICD-10-CM

## 2019-04-25 DIAGNOSIS — E11.65 TYPE 2 DIABETES MELLITUS WITH HYPERGLYCEMIA: ICD-10-CM

## 2019-04-25 DIAGNOSIS — N40.0 BENIGN PROSTATIC HYPERPLASIA WITHOUT LOWER URINARY TRACT SYMPTOMS: ICD-10-CM

## 2019-04-25 DIAGNOSIS — F79 UNSPECIFIED INTELLECTUAL DISABILITIES: ICD-10-CM

## 2019-04-25 DIAGNOSIS — R32 UNSPECIFIED URINARY INCONTINENCE: ICD-10-CM

## 2019-04-25 LAB
ANION GAP SERPL CALC-SCNC: 5 MMOL/L — SIGNIFICANT CHANGE UP (ref 5–17)
APPEARANCE UR: CLEAR — SIGNIFICANT CHANGE UP
BACTERIA # UR AUTO: ABNORMAL
BASE EXCESS BLDV CALC-SCNC: 3.3 MMOL/L — HIGH (ref -2–2)
BASOPHILS # BLD AUTO: 0.02 K/UL — SIGNIFICANT CHANGE UP (ref 0–0.2)
BASOPHILS NFR BLD AUTO: 0.4 % — SIGNIFICANT CHANGE UP (ref 0–2)
BILIRUB UR-MCNC: NEGATIVE — SIGNIFICANT CHANGE UP
BUN SERPL-MCNC: 17 MG/DL — SIGNIFICANT CHANGE UP (ref 7–23)
CALCIUM SERPL-MCNC: 9.1 MG/DL — SIGNIFICANT CHANGE UP (ref 8.5–10.1)
CHLORIDE SERPL-SCNC: 103 MMOL/L — SIGNIFICANT CHANGE UP (ref 96–108)
CO2 SERPL-SCNC: 30 MMOL/L — SIGNIFICANT CHANGE UP (ref 22–31)
COLOR SPEC: SIGNIFICANT CHANGE UP
COMMENT - URINE: SIGNIFICANT CHANGE UP
CREAT SERPL-MCNC: 0.64 MG/DL — SIGNIFICANT CHANGE UP (ref 0.5–1.3)
DIFF PNL FLD: NEGATIVE — SIGNIFICANT CHANGE UP
EOSINOPHIL # BLD AUTO: 0.21 K/UL — SIGNIFICANT CHANGE UP (ref 0–0.5)
EOSINOPHIL NFR BLD AUTO: 3.7 % — SIGNIFICANT CHANGE UP (ref 0–6)
EPI CELLS # UR: NEGATIVE — SIGNIFICANT CHANGE UP
GLUCOSE SERPL-MCNC: 283 MG/DL — HIGH (ref 70–99)
GLUCOSE UR QL: 1000 MG/DL
HCO3 BLDV-SCNC: 28 MMOL/L — SIGNIFICANT CHANGE UP (ref 21–29)
HCT VFR BLD CALC: 34.9 % — LOW (ref 39–50)
HGB BLD-MCNC: 11.6 G/DL — LOW (ref 13–17)
IMM GRANULOCYTES NFR BLD AUTO: 0.4 % — SIGNIFICANT CHANGE UP (ref 0–1.5)
KETONES UR-MCNC: ABNORMAL
LEUKOCYTE ESTERASE UR-ACNC: ABNORMAL
LYMPHOCYTES # BLD AUTO: 1.09 K/UL — SIGNIFICANT CHANGE UP (ref 1–3.3)
LYMPHOCYTES # BLD AUTO: 19.4 % — SIGNIFICANT CHANGE UP (ref 13–44)
MCHC RBC-ENTMCNC: 30.7 PG — SIGNIFICANT CHANGE UP (ref 27–34)
MCHC RBC-ENTMCNC: 33.2 GM/DL — SIGNIFICANT CHANGE UP (ref 32–36)
MCV RBC AUTO: 92.3 FL — SIGNIFICANT CHANGE UP (ref 80–100)
MONOCYTES # BLD AUTO: 0.74 K/UL — SIGNIFICANT CHANGE UP (ref 0–0.9)
MONOCYTES NFR BLD AUTO: 13.2 % — SIGNIFICANT CHANGE UP (ref 2–14)
NEUTROPHILS # BLD AUTO: 3.53 K/UL — SIGNIFICANT CHANGE UP (ref 1.8–7.4)
NEUTROPHILS NFR BLD AUTO: 62.9 % — SIGNIFICANT CHANGE UP (ref 43–77)
NITRITE UR-MCNC: NEGATIVE — SIGNIFICANT CHANGE UP
NRBC # BLD: 0 /100 WBCS — SIGNIFICANT CHANGE UP (ref 0–0)
PCO2 BLDV: 47 MMHG — SIGNIFICANT CHANGE UP (ref 35–50)
PH BLDV: 7.4 — SIGNIFICANT CHANGE UP (ref 7.35–7.45)
PH UR: 5 — SIGNIFICANT CHANGE UP (ref 5–8)
PLATELET # BLD AUTO: 154 K/UL — SIGNIFICANT CHANGE UP (ref 150–400)
PO2 BLDV: 58 MMHG — HIGH (ref 25–45)
POTASSIUM SERPL-MCNC: 4.4 MMOL/L — SIGNIFICANT CHANGE UP (ref 3.5–5.3)
POTASSIUM SERPL-SCNC: 4.4 MMOL/L — SIGNIFICANT CHANGE UP (ref 3.5–5.3)
PROT UR-MCNC: 15 MG/DL
RBC # BLD: 3.78 M/UL — LOW (ref 4.2–5.8)
RBC # FLD: 12.8 % — SIGNIFICANT CHANGE UP (ref 10.3–14.5)
RBC CASTS # UR COMP ASSIST: SIGNIFICANT CHANGE UP /HPF (ref 0–4)
SAO2 % BLDV: 85 % — SIGNIFICANT CHANGE UP (ref 67–88)
SODIUM SERPL-SCNC: 138 MMOL/L — SIGNIFICANT CHANGE UP (ref 135–145)
SP GR SPEC: 1.01 — SIGNIFICANT CHANGE UP (ref 1.01–1.02)
UROBILINOGEN FLD QL: NEGATIVE MG/DL — SIGNIFICANT CHANGE UP
VALPROATE SERPL-MCNC: 62 UG/ML — SIGNIFICANT CHANGE UP (ref 50–100)
WBC # BLD: 5.61 K/UL — SIGNIFICANT CHANGE UP (ref 3.8–10.5)
WBC # FLD AUTO: 5.61 K/UL — SIGNIFICANT CHANGE UP (ref 3.8–10.5)
WBC UR QL: SIGNIFICANT CHANGE UP

## 2019-04-25 PROCEDURE — 71045 X-RAY EXAM CHEST 1 VIEW: CPT | Mod: 26

## 2019-04-25 PROCEDURE — 93010 ELECTROCARDIOGRAM REPORT: CPT

## 2019-04-25 PROCEDURE — 99284 EMERGENCY DEPT VISIT MOD MDM: CPT

## 2019-04-25 RX ORDER — SODIUM CHLORIDE 9 MG/ML
1000 INJECTION INTRAMUSCULAR; INTRAVENOUS; SUBCUTANEOUS ONCE
Qty: 0 | Refills: 0 | Status: COMPLETED | OUTPATIENT
Start: 2019-04-25 | End: 2019-04-25

## 2019-04-25 RX ADMIN — SODIUM CHLORIDE 1000 MILLILITER(S): 9 INJECTION INTRAMUSCULAR; INTRAVENOUS; SUBCUTANEOUS at 12:10

## 2019-04-25 RX ADMIN — SODIUM CHLORIDE 1000 MILLILITER(S): 9 INJECTION INTRAMUSCULAR; INTRAVENOUS; SUBCUTANEOUS at 13:10

## 2019-04-25 NOTE — ED PROVIDER NOTE - OBJECTIVE STATEMENT
68 y/o M with a PMHx of BPH, GERD, DM, HTN, Hyponatremia, Incontinence, Osteoporosis, Parkinson, seizures, SIADH presenting to the ED BIBEMS from NH c/o hyperglycemia and nasal congestion.  Denies CP, SOB. 68 y/o M with a PMHx of BPH, GERD, DM, HTN, Hyponatremia, Incontinence, Osteoporosis, Parkinson, seizures, SIADH presenting to the ED BIBEMS from NH c/o hyperglycemia and nasal congestion.   Denies CP, SOB. 68 y/o M with a PMHx of BPH, GERD, DM, HTN, Hyponatremia, Incontinence, Osteoporosis, Parkinson, seizures, SIADH presenting to the ED BIBEMS from NH c/o hyperglycemia and nasal congestion. Denies CP, or SOB. No other complaints at this time.

## 2019-04-25 NOTE — ED PROVIDER NOTE - PMH
BPH (benign prostatic hyperplasia)    Chronic GERD    Developmental disability    Diabetes    Hypertension    Hyponatremia    Incontinence    Osteoporosis    Parkinson disease    Seizures    SIADH (syndrome of inappropriate ADH production)

## 2019-04-26 ENCOUNTER — EMERGENCY (EMERGENCY)
Facility: HOSPITAL | Age: 69
LOS: 0 days | Discharge: HOME | End: 2019-04-26
Attending: EMERGENCY MEDICINE | Admitting: EMERGENCY MEDICINE
Payer: MEDICARE

## 2019-04-26 VITALS
HEART RATE: 83 BPM | TEMPERATURE: 98 F | OXYGEN SATURATION: 100 % | DIASTOLIC BLOOD PRESSURE: 72 MMHG | RESPIRATION RATE: 18 BRPM | SYSTOLIC BLOOD PRESSURE: 115 MMHG

## 2019-04-26 VITALS — WEIGHT: 162.92 LBS | HEIGHT: 66 IN

## 2019-04-26 DIAGNOSIS — M81.0 AGE-RELATED OSTEOPOROSIS WITHOUT CURRENT PATHOLOGICAL FRACTURE: ICD-10-CM

## 2019-04-26 DIAGNOSIS — R32 UNSPECIFIED URINARY INCONTINENCE: ICD-10-CM

## 2019-04-26 DIAGNOSIS — R56.9 UNSPECIFIED CONVULSIONS: ICD-10-CM

## 2019-04-26 DIAGNOSIS — G93.41 METABOLIC ENCEPHALOPATHY: ICD-10-CM

## 2019-04-26 DIAGNOSIS — I10 ESSENTIAL (PRIMARY) HYPERTENSION: ICD-10-CM

## 2019-04-26 DIAGNOSIS — E11.65 TYPE 2 DIABETES MELLITUS WITH HYPERGLYCEMIA: ICD-10-CM

## 2019-04-26 DIAGNOSIS — G20 PARKINSON'S DISEASE: ICD-10-CM

## 2019-04-26 DIAGNOSIS — N40.1 BENIGN PROSTATIC HYPERPLASIA WITH LOWER URINARY TRACT SYMPTOMS: ICD-10-CM

## 2019-04-26 DIAGNOSIS — E87.2 ACIDOSIS: ICD-10-CM

## 2019-04-26 DIAGNOSIS — F79 UNSPECIFIED INTELLECTUAL DISABILITIES: ICD-10-CM

## 2019-04-26 DIAGNOSIS — Z79.84 LONG TERM (CURRENT) USE OF ORAL HYPOGLYCEMIC DRUGS: ICD-10-CM

## 2019-04-26 DIAGNOSIS — N39.0 URINARY TRACT INFECTION, SITE NOT SPECIFIED: ICD-10-CM

## 2019-04-26 DIAGNOSIS — Z98.890 OTHER SPECIFIED POSTPROCEDURAL STATES: Chronic | ICD-10-CM

## 2019-04-26 DIAGNOSIS — K21.9 GASTRO-ESOPHAGEAL REFLUX DISEASE WITHOUT ESOPHAGITIS: ICD-10-CM

## 2019-04-26 DIAGNOSIS — R39.12 POOR URINARY STREAM: ICD-10-CM

## 2019-04-26 DIAGNOSIS — N17.9 ACUTE KIDNEY FAILURE, UNSPECIFIED: ICD-10-CM

## 2019-04-26 DIAGNOSIS — R33.8 OTHER RETENTION OF URINE: ICD-10-CM

## 2019-04-26 DIAGNOSIS — N40.0 BENIGN PROSTATIC HYPERPLASIA WITHOUT LOWER URINARY TRACT SYMPTOMS: ICD-10-CM

## 2019-04-26 DIAGNOSIS — E22.2 SYNDROME OF INAPPROPRIATE SECRETION OF ANTIDIURETIC HORMONE: ICD-10-CM

## 2019-04-26 DIAGNOSIS — E87.0 HYPEROSMOLALITY AND HYPERNATREMIA: ICD-10-CM

## 2019-04-26 DIAGNOSIS — Z66 DO NOT RESUSCITATE: ICD-10-CM

## 2019-04-26 DIAGNOSIS — E86.1 HYPOVOLEMIA: ICD-10-CM

## 2019-04-26 DIAGNOSIS — G40.909 EPILEPSY, UNSPECIFIED, NOT INTRACTABLE, WITHOUT STATUS EPILEPTICUS: ICD-10-CM

## 2019-04-26 DIAGNOSIS — R73.9 HYPERGLYCEMIA, UNSPECIFIED: ICD-10-CM

## 2019-04-26 DIAGNOSIS — F81.9 DEVELOPMENTAL DISORDER OF SCHOLASTIC SKILLS, UNSPECIFIED: ICD-10-CM

## 2019-04-26 DIAGNOSIS — E11.9 TYPE 2 DIABETES MELLITUS WITHOUT COMPLICATIONS: ICD-10-CM

## 2019-04-26 LAB
ALBUMIN SERPL ELPH-MCNC: 2.7 G/DL — LOW (ref 3.3–5)
ALP SERPL-CCNC: 74 U/L — SIGNIFICANT CHANGE UP (ref 40–120)
ALT FLD-CCNC: 10 U/L — LOW (ref 12–78)
ANION GAP SERPL CALC-SCNC: 8 MMOL/L — SIGNIFICANT CHANGE UP (ref 5–17)
AST SERPL-CCNC: 12 U/L — LOW (ref 15–37)
BASOPHILS # BLD AUTO: 0.02 K/UL — SIGNIFICANT CHANGE UP (ref 0–0.2)
BASOPHILS NFR BLD AUTO: 0.4 % — SIGNIFICANT CHANGE UP (ref 0–2)
BILIRUB SERPL-MCNC: 0.4 MG/DL — SIGNIFICANT CHANGE UP (ref 0.2–1.2)
BUN SERPL-MCNC: 9 MG/DL — SIGNIFICANT CHANGE UP (ref 7–23)
CALCIUM SERPL-MCNC: 9.7 MG/DL — SIGNIFICANT CHANGE UP (ref 8.5–10.1)
CHLORIDE SERPL-SCNC: 99 MMOL/L — SIGNIFICANT CHANGE UP (ref 96–108)
CO2 SERPL-SCNC: 28 MMOL/L — SIGNIFICANT CHANGE UP (ref 22–31)
CREAT SERPL-MCNC: 0.74 MG/DL — SIGNIFICANT CHANGE UP (ref 0.5–1.3)
CULTURE RESULTS: NO GROWTH — SIGNIFICANT CHANGE UP
EOSINOPHIL # BLD AUTO: 0.06 K/UL — SIGNIFICANT CHANGE UP (ref 0–0.5)
EOSINOPHIL NFR BLD AUTO: 1.1 % — SIGNIFICANT CHANGE UP (ref 0–6)
GLUCOSE BLDC GLUCOMTR-MCNC: 254 MG/DL — HIGH (ref 70–99)
GLUCOSE BLDC GLUCOMTR-MCNC: 267 MG/DL — HIGH (ref 70–99)
GLUCOSE BLDC GLUCOMTR-MCNC: 272 MG/DL — HIGH (ref 70–99)
GLUCOSE SERPL-MCNC: 314 MG/DL — HIGH (ref 70–99)
HCT VFR BLD CALC: 36 % — LOW (ref 39–50)
HGB BLD-MCNC: 12.2 G/DL — LOW (ref 13–17)
IMM GRANULOCYTES NFR BLD AUTO: 0.5 % — SIGNIFICANT CHANGE UP (ref 0–1.5)
LYMPHOCYTES # BLD AUTO: 1.26 K/UL — SIGNIFICANT CHANGE UP (ref 1–3.3)
LYMPHOCYTES # BLD AUTO: 22.2 % — SIGNIFICANT CHANGE UP (ref 13–44)
MANUAL SMEAR VERIFICATION: SIGNIFICANT CHANGE UP
MCHC RBC-ENTMCNC: 30.9 PG — SIGNIFICANT CHANGE UP (ref 27–34)
MCHC RBC-ENTMCNC: 33.9 GM/DL — SIGNIFICANT CHANGE UP (ref 32–36)
MCV RBC AUTO: 91.1 FL — SIGNIFICANT CHANGE UP (ref 80–100)
MONOCYTES # BLD AUTO: 0.99 K/UL — HIGH (ref 0–0.9)
MONOCYTES NFR BLD AUTO: 17.5 % — HIGH (ref 2–14)
NEUTROPHILS # BLD AUTO: 3.31 K/UL — SIGNIFICANT CHANGE UP (ref 1.8–7.4)
NEUTROPHILS NFR BLD AUTO: 58.3 % — SIGNIFICANT CHANGE UP (ref 43–77)
NRBC # BLD: 0 /100 WBCS — SIGNIFICANT CHANGE UP (ref 0–0)
PLAT MORPH BLD: NORMAL — SIGNIFICANT CHANGE UP
PLATELET # BLD AUTO: 172 K/UL — SIGNIFICANT CHANGE UP (ref 150–400)
POTASSIUM SERPL-MCNC: 4 MMOL/L — SIGNIFICANT CHANGE UP (ref 3.5–5.3)
POTASSIUM SERPL-SCNC: 4 MMOL/L — SIGNIFICANT CHANGE UP (ref 3.5–5.3)
PROT SERPL-MCNC: 6.1 GM/DL — SIGNIFICANT CHANGE UP (ref 6–8.3)
RBC # BLD: 3.95 M/UL — LOW (ref 4.2–5.8)
RBC # FLD: 12.6 % — SIGNIFICANT CHANGE UP (ref 10.3–14.5)
RBC BLD AUTO: NORMAL — SIGNIFICANT CHANGE UP
SODIUM SERPL-SCNC: 135 MMOL/L — SIGNIFICANT CHANGE UP (ref 135–145)
SPECIMEN SOURCE: SIGNIFICANT CHANGE UP
VALPROATE SERPL-MCNC: 59 UG/ML — SIGNIFICANT CHANGE UP (ref 50–100)
WBC # BLD: 5.67 K/UL — SIGNIFICANT CHANGE UP (ref 3.8–10.5)
WBC # FLD AUTO: 5.67 K/UL — SIGNIFICANT CHANGE UP (ref 3.8–10.5)

## 2019-04-26 PROCEDURE — 93010 ELECTROCARDIOGRAM REPORT: CPT

## 2019-04-26 PROCEDURE — 99284 EMERGENCY DEPT VISIT MOD MDM: CPT

## 2019-04-26 RX ORDER — INSULIN HUMAN 100 [IU]/ML
5 INJECTION, SOLUTION SUBCUTANEOUS ONCE
Qty: 0 | Refills: 0 | Status: COMPLETED | OUTPATIENT
Start: 2019-04-26 | End: 2019-04-26

## 2019-04-26 RX ORDER — INSULIN HUMAN 100 [IU]/ML
4 INJECTION, SOLUTION SUBCUTANEOUS ONCE
Qty: 0 | Refills: 0 | Status: DISCONTINUED | OUTPATIENT
Start: 2019-04-26 | End: 2019-04-26

## 2019-04-26 RX ORDER — GLIMEPIRIDE 1 MG
0.5 TABLET ORAL ONCE
Qty: 0 | Refills: 0 | Status: COMPLETED | OUTPATIENT
Start: 2019-04-26 | End: 2019-04-26

## 2019-04-26 RX ORDER — SODIUM CHLORIDE 9 MG/ML
2000 INJECTION INTRAMUSCULAR; INTRAVENOUS; SUBCUTANEOUS ONCE
Qty: 0 | Refills: 0 | Status: COMPLETED | OUTPATIENT
Start: 2019-04-26 | End: 2019-04-26

## 2019-04-26 RX ADMIN — SODIUM CHLORIDE 2000 MILLILITER(S): 9 INJECTION INTRAMUSCULAR; INTRAVENOUS; SUBCUTANEOUS at 11:32

## 2019-04-26 RX ADMIN — Medication 0.5 MILLIGRAM(S): at 15:08

## 2019-04-26 RX ADMIN — SODIUM CHLORIDE 1000 MILLILITER(S): 9 INJECTION INTRAMUSCULAR; INTRAVENOUS; SUBCUTANEOUS at 10:32

## 2019-04-26 RX ADMIN — INSULIN HUMAN 5 UNIT(S): 100 INJECTION, SOLUTION SUBCUTANEOUS at 13:56

## 2019-04-26 NOTE — ED PROVIDER NOTE - PROGRESS NOTE DETAILS
discussed with head nurse and group home, pt given insulin subcutaneous here and annika give additiona 0.5 mg amaryl.  pt to increase amaryl to 1 mg bid not 0.5 and follow up with his endocrinologist monday and eat a low carb diet, no sugar

## 2019-04-26 NOTE — ED STATDOCS - PROGRESS NOTE DETAILS
Rama JIMENEZ for Dr. Salas: 70 y/o male with PMHx of BPH, GERD, DM on metformin BID, HTN, Parkinson's, seizures, SIADH presents to the ED from group home c/o hyperglycemia of BGM of 396 at breakfast this morning. As per aide at bedside, pt was given his metformin today which didn't relieve hyperglycemia. Pt was seen in th ED yesterday for the same sx and was d/c with instructions to f/u with PCP. PCP is unavailable for another 3 days, prompting pt to come back to the ED. BGM at triage was 318 and BP was 98/66 at triage. Will send pt to main ED for further evaluation.

## 2019-04-26 NOTE — ED ADULT NURSE NOTE - OBJECTIVE STATEMENT
Pt at baseline mental status, pt presents with elevated blood glucose, pt seen in ED yesterday and advised to follow up with PCP, unable to get appt until monday. Pt private aide at bedside.

## 2019-04-26 NOTE — ED PROVIDER NOTE - CONSTITUTIONAL, MLM
normal... Middle aged male, mildly ill appearing, awake, alert, oriented to person, place, time/situation and in no apparent distress.

## 2019-04-26 NOTE — ED ADULT NURSE NOTE - NSIMPLEMENTINTERV_GEN_ALL_ED
Implemented All Fall with Harm Risk Interventions:  [place] to call system. Call bell, personal items and telephone within reach. Instruct patient to call for assistance. Room bathroom lighting operational. Non-slip footwear when patient is off stretcher. Physically safe environment: no spills, clutter or unnecessary equipment. Stretcher in lowest position, wheels locked, appropriate side rails in place. Provide visual cue, wrist band, yellow gown, etc. Monitor gait and stability. Monitor for mental status changes and reorient to person, place, and time. Review medications for side effects contributing to fall risk. Reinforce activity limits and safety measures with patient and family. Provide visual clues: red socks.

## 2019-04-26 NOTE — ED PROVIDER NOTE - PSYCHIATRIC, MLM
Alert and oriented to person, place, time/situation. normal mood and affect. no apparent risk to self or others. No SI/HI.

## 2019-04-26 NOTE — ED PROVIDER NOTE - NSFOLLOWUPCLINICS_GEN_ALL_ED_FT
[facility]  Family Medicine  [address] [place] [address]  Phone: [phone]  Fax:   Follow Up Time:

## 2019-04-26 NOTE — ED PROVIDER NOTE - OBJECTIVE STATEMENT
70 y/o male with PMHx of BPH, GERD, DM on 1000mg Metformin BID and 0.5 Amaryl BID, HTN, Parkinson's, seizures, SIADH presents to the ED from group home c/o hyperglycemia of BGM of 396 at breakfast this morning. As per aide at bedside, pt was given his Metformin today which didn't relieve hyperglycemia. This is pt's third ED visit regarding hyperglycemia. PCP is unavailable for another 3 days, prompting pt to come back to the ED. Pt on a fluid restricted diet due to SIADH. Pt with no complaints.

## 2019-04-26 NOTE — ED PROVIDER NOTE - CLINICAL SUMMARY MEDICAL DECISION MAKING FREE TEXT BOX
Middle aged male with hx of DM and SIADH presents with hyperglycemia. Pt in no distress. Plan for labs, rehydrate, reassess.

## 2019-04-26 NOTE — ED ADULT TRIAGE NOTE - CHIEF COMPLAINT QUOTE
patient presents to ER for elevated blood glucose, patient was seen in ER yesterday, returns today for same.  Patient was instructed to follow up with pcp at discharge yesterday, however pcp is unavailable until monday.  Patient did take all his medications yesterday.   at group home after breakfast and medications.   at triage.

## 2019-04-27 ENCOUNTER — EMERGENCY (EMERGENCY)
Facility: HOSPITAL | Age: 69
LOS: 0 days | Discharge: HOME | End: 2019-04-27
Attending: EMERGENCY MEDICINE | Admitting: EMERGENCY MEDICINE
Payer: MEDICARE

## 2019-04-27 VITALS
RESPIRATION RATE: 15 BRPM | SYSTOLIC BLOOD PRESSURE: 90 MMHG | WEIGHT: 164.91 LBS | OXYGEN SATURATION: 95 % | TEMPERATURE: 98 F | HEART RATE: 84 BPM | HEIGHT: 68 IN | DIASTOLIC BLOOD PRESSURE: 57 MMHG

## 2019-04-27 VITALS
DIASTOLIC BLOOD PRESSURE: 66 MMHG | RESPIRATION RATE: 18 BRPM | HEART RATE: 79 BPM | SYSTOLIC BLOOD PRESSURE: 97 MMHG | TEMPERATURE: 98 F | OXYGEN SATURATION: 98 %

## 2019-04-27 DIAGNOSIS — Z79.82 LONG TERM (CURRENT) USE OF ASPIRIN: ICD-10-CM

## 2019-04-27 DIAGNOSIS — G20 PARKINSON'S DISEASE: ICD-10-CM

## 2019-04-27 DIAGNOSIS — I10 ESSENTIAL (PRIMARY) HYPERTENSION: ICD-10-CM

## 2019-04-27 DIAGNOSIS — E22.2 SYNDROME OF INAPPROPRIATE SECRETION OF ANTIDIURETIC HORMONE: ICD-10-CM

## 2019-04-27 DIAGNOSIS — M81.0 AGE-RELATED OSTEOPOROSIS WITHOUT CURRENT PATHOLOGICAL FRACTURE: ICD-10-CM

## 2019-04-27 DIAGNOSIS — G40.909 EPILEPSY, UNSPECIFIED, NOT INTRACTABLE, WITHOUT STATUS EPILEPTICUS: ICD-10-CM

## 2019-04-27 DIAGNOSIS — K21.9 GASTRO-ESOPHAGEAL REFLUX DISEASE WITHOUT ESOPHAGITIS: ICD-10-CM

## 2019-04-27 DIAGNOSIS — E11.65 TYPE 2 DIABETES MELLITUS WITH HYPERGLYCEMIA: ICD-10-CM

## 2019-04-27 DIAGNOSIS — Z98.890 OTHER SPECIFIED POSTPROCEDURAL STATES: Chronic | ICD-10-CM

## 2019-04-27 DIAGNOSIS — N40.0 BENIGN PROSTATIC HYPERPLASIA WITHOUT LOWER URINARY TRACT SYMPTOMS: ICD-10-CM

## 2019-04-27 DIAGNOSIS — R73.9 HYPERGLYCEMIA, UNSPECIFIED: ICD-10-CM

## 2019-04-27 DIAGNOSIS — E87.1 HYPO-OSMOLALITY AND HYPONATREMIA: ICD-10-CM

## 2019-04-27 LAB — GLUCOSE BLDC GLUCOMTR-MCNC: 314 MG/DL — HIGH (ref 70–99)

## 2019-04-27 PROCEDURE — 99283 EMERGENCY DEPT VISIT LOW MDM: CPT

## 2019-04-27 PROCEDURE — 93010 ELECTROCARDIOGRAM REPORT: CPT

## 2019-04-27 RX ORDER — GLIMEPIRIDE 1 MG
1 TABLET ORAL
Qty: 6 | Refills: 0 | OUTPATIENT
Start: 2019-04-27 | End: 2019-04-29

## 2019-04-27 RX ORDER — INSULIN HUMAN 100 [IU]/ML
4 INJECTION, SOLUTION SUBCUTANEOUS ONCE
Qty: 0 | Refills: 0 | Status: COMPLETED | OUTPATIENT
Start: 2019-04-27 | End: 2019-04-27

## 2019-04-27 RX ORDER — INSULIN HUMAN 100 [IU]/ML
6 INJECTION, SOLUTION SUBCUTANEOUS ONCE
Qty: 0 | Refills: 0 | Status: COMPLETED | OUTPATIENT
Start: 2019-04-27 | End: 2019-04-27

## 2019-04-27 RX ADMIN — INSULIN HUMAN 6 UNIT(S): 100 INJECTION, SOLUTION SUBCUTANEOUS at 12:01

## 2019-04-27 RX ADMIN — INSULIN HUMAN 4 UNIT(S): 100 INJECTION, SOLUTION SUBCUTANEOUS at 13:03

## 2019-04-27 NOTE — ED ADULT NURSE NOTE - OBJECTIVE STATEMENT
pt bgm at group home over 300 and protocol is to take to ER. Pt has appt on Monday with endocrinologist.

## 2019-04-27 NOTE — ED PROVIDER NOTE - OBJECTIVE STATEMENT
70 y/o male with PMHx of BPH, GERD, DM on 1000mg Metformin BID and 0.5 Amaryl BID, HTN, Parkinson's, seizures, SIADH presents to the ED from group home c/o hyperglycemia of BGM of 313 this morning. Pt was seen yesterday in the ED. Nurse at facility was told to increase Amaryl to 1mg and pt was not given increased dose last night but was given increased dose this morning. Pt has an appointment with endocrinologist on Monday, however another MD was present and was not comfortable to consult because he does not know pt. Pt is not DKA, is well appearing. No complaints at this time. Nurse at facility is compliant with diet and gave medication on time today. This is the pt's fourth ED visit regarding hyperglycemia. Pt is on a fluid restricted diet due to SIADH.

## 2019-04-27 NOTE — ED ADULT NURSE REASSESSMENT NOTE - NS ED NURSE REASSESS COMMENT FT1
spoke with PARESH Phillips in the group home regarding plan. Tried to get in touch with pts endocrine MD Dr Santiago only able to speak with MD herman on call endocrine at [facility] who suggested to increase Po meds until appt with MD on Monday plan is to DC pt home with increase of glimepiride to 1mg BID and medicating pt with regular insulin in ED prior to DC home plan agreed by Jacqueline House

## 2019-04-27 NOTE — ED ADULT TRIAGE NOTE - CHIEF COMPLAINT QUOTE
BGm 300 at group home, as per protocol he needs to be evaluated., seen in ED multiple times for same thing this week.

## 2019-04-27 NOTE — ED PROVIDER NOTE - CLINICAL SUMMARY MEDICAL DECISION MAKING FREE TEXT BOX
70 y/o male with DM on Metformin and Amaryl with recurrent hyperglycemia.  Will sent a prescription of 1mg of Amaryl BID instead of 0.5 mg, will give a dose of insulin in ED. Will follow up with Endocrinologist on Monday.

## 2019-04-27 NOTE — ED ADULT NURSE NOTE - NSIMPLEMENTINTERV_GEN_ALL_ED
Implemented All Fall Risk Interventions:  [place] to call system. Call bell, personal items and telephone within reach. Instruct patient to call for assistance. Room bathroom lighting operational. Non-slip footwear when patient is off stretcher. Physically safe environment: no spills, clutter or unnecessary equipment. Stretcher in lowest position, wheels locked, appropriate side rails in place. Provide visual cue, wrist band, yellow gown, etc. Monitor gait and stability. Monitor for mental status changes and reorient to person, place, and time. Review medications for side effects contributing to fall risk. Reinforce activity limits and safety measures with patient and family.

## 2019-04-27 NOTE — ED PROVIDER NOTE - CCCP TRG CHIEF CMPLNT
Quality 431: Preventive Care And Screening: Unhealthy Alcohol Use - Screening: Patient screened for unhealthy alcohol use using a single question and scores less than 2 times per year Name And Contact Information For Health Care Proxy: Gigi Zachary [phone] Quality 47: Advance Care Plan: Advance Care Planning discussed and documented; advance care plan or surrogate decision maker documented in the medical record. Quality 111:Pneumonia Vaccination Status For Older Adults: Pneumococcal Vaccination Previously Received Detail Level: Detailed Quality 131: Pain Assessment And Follow-Up: Pain assessment using a standardized tool is documented as negative, no follow-up plan required Quality 226: Preventive Care And Screening: Tobacco Use: Screening And Cessation Intervention: Patient screened for tobacco and never smoked Quality 110: Preventive Care And Screening: Influenza Immunization: Influenza Immunization Administered during Influenza season hyperglycemia

## 2019-04-29 ENCOUNTER — APPOINTMENT (OUTPATIENT)
Dept: UROLOGY | Facility: CLINIC | Age: 69
End: 2019-04-29
Payer: MEDICARE

## 2019-04-29 PROCEDURE — 51798 US URINE CAPACITY MEASURE: CPT

## 2019-04-29 PROCEDURE — 99213 OFFICE O/P EST LOW 20 MIN: CPT | Mod: 25

## 2019-05-03 ENCOUNTER — APPOINTMENT (OUTPATIENT)
Dept: CARDIOLOGY | Facility: CLINIC | Age: 69
End: 2019-05-03
Payer: MEDICARE

## 2019-05-03 ENCOUNTER — NON-APPOINTMENT (OUTPATIENT)
Age: 69
End: 2019-05-03

## 2019-05-03 VITALS
SYSTOLIC BLOOD PRESSURE: 92 MMHG | HEART RATE: 74 BPM | TEMPERATURE: 97.6 F | OXYGEN SATURATION: 98 % | BODY MASS INDEX: 27.16 KG/M2 | WEIGHT: 163 LBS | RESPIRATION RATE: 14 BRPM | DIASTOLIC BLOOD PRESSURE: 60 MMHG | HEIGHT: 65 IN

## 2019-05-03 PROCEDURE — 99214 OFFICE O/P EST MOD 30 MIN: CPT | Mod: 25

## 2019-05-03 PROCEDURE — 93000 ELECTROCARDIOGRAM COMPLETE: CPT

## 2019-05-03 RX ORDER — KETOCONAZOLE 20 MG/G
2 CREAM TOPICAL
Qty: 60 | Refills: 0 | Status: DISCONTINUED | COMMUNITY
Start: 2017-08-03 | End: 2019-05-03

## 2019-05-03 RX ORDER — OXYBUTYNIN CHLORIDE 10 MG/1
10 TABLET, EXTENDED RELEASE ORAL
Refills: 0 | Status: DISCONTINUED | COMMUNITY
End: 2019-05-03

## 2019-05-03 RX ORDER — KETOCONAZOLE 20.5 MG/ML
2 SHAMPOO, SUSPENSION TOPICAL
Qty: 120 | Refills: 0 | Status: DISCONTINUED | COMMUNITY
Start: 2017-10-20 | End: 2019-05-03

## 2019-05-03 RX ORDER — ROPINIROLE 0.25 MG/1
0.25 TABLET, FILM COATED ORAL
Refills: 0 | Status: DISCONTINUED | COMMUNITY
End: 2019-05-03

## 2019-05-03 NOTE — REVIEW OF SYSTEMS
[see HPI] : see HPI [Convulsions] : convulsions [Negative] : Heme/Lymph [Chest Pain] : no chest pain [Shortness Of Breath] : no shortness of breath

## 2019-05-03 NOTE — HISTORY OF PRESENT ILLNESS
[FreeTextEntry1] : Pt. presents today for routine follow-up. \par \par Pt. feels well. wheel chair bound \par \par  He denies  chest pain, SOB, palpitation, presyncope, syncope or edema. ( He is accompanied by his aides who confirms this). He ambulates without ANDERSEN or chest pain though very limited due to unsteady gait . He ambulates with the assistance of a walker short distances but spends much of the day in his wheelchair. \par \par He had been taking Carvedilol which has been stopped related to low BP in the past. his bp contiue low normal range \par \par Is followed by his PCP  for tx. of DM and HTN. Has labs done there. \par \par Last echocardiogram 12/2018. ( see summary section above).  no change aorta size  4.1 cm \par \par \par \par

## 2019-05-03 NOTE — DISCUSSION/SUMMARY
[FreeTextEntry1] : \par \par \par \par Assessment:\par \par Hypertension - now low normal range SBP  recent hospitalization due to hypotension , patient is on flomax.  would recommend to discontinue flomax  if his sbp continue to be lower than 90s \par \par Pvcs - on resting EKGs  in the past. No sxs. Beta blocker had been d/george related to low BP and was repeatedly being held anyway.  12/2018 echo with nl. LV systolic function mild dilated aortic root without much change from prior . No medication  treatment required, \par \par Mild aortic root dilatation by echocardiogram -  no change in size in follow up echo  \par \par DM - as per PCP. .\par \par Cardiac status appears stable at this time. \par \par   please fax lipid profile report   , please be aware the medication list above is not well updated  \par \par  follow up after 6 months

## 2019-05-04 NOTE — ASSESSMENT
[FreeTextEntry1] : follow up for urinary retention noted in HH.  PVR in the office was 54 and he has returned to his baseline.  Will have follow up as scheduled previously or sooner if needed.

## 2019-05-04 NOTE — HISTORY OF PRESENT ILLNESS
[FreeTextEntry1] : Patient presents in follow up after being seen at [facility] and noted to have urinary retention. He is here for follow up and evaluation today.  He is with caretakers who report no new events and that is doing much better.

## 2019-05-04 NOTE — PHYSICAL EXAM
[General Appearance - Well Developed] : well developed [General Appearance - Well Nourished] : well nourished [Well Groomed] : well groomed [General Appearance - In No Acute Distress] : no acute distress [Abdomen Soft] : soft [Abdomen Tenderness] : non-tender [Costovertebral Angle Tenderness] : no ~M costovertebral angle tenderness [Urinary Bladder Findings] : the bladder was normal on palpation [Edema] : no peripheral edema [] : no respiratory distress [Respiration, Rhythm And Depth] : normal respiratory rhythm and effort [Exaggerated Use Of Accessory Muscles For Inspiration] : no accessory muscle use [Oriented To Time, Place, And Person] : oriented to person, place, and time [Affect] : the affect was normal [Mood] : the mood was normal [Not Anxious] : not anxious [No Focal Deficits] : no focal deficits [No Palpable Adenopathy] : no palpable adenopathy [FreeTextEntry1] : Mental retardation

## 2019-05-07 ENCOUNTER — APPOINTMENT (OUTPATIENT)
Dept: UROLOGY | Facility: CLINIC | Age: 69
End: 2019-05-07

## 2019-05-14 ENCOUNTER — APPOINTMENT (OUTPATIENT)
Dept: UROLOGY | Facility: CLINIC | Age: 69
End: 2019-05-14

## 2019-05-29 ENCOUNTER — APPOINTMENT (OUTPATIENT)
Dept: UROLOGY | Facility: CLINIC | Age: 69
End: 2019-05-29
Payer: MEDICARE

## 2019-05-29 PROCEDURE — 99213 OFFICE O/P EST LOW 20 MIN: CPT

## 2019-05-30 NOTE — PHYSICAL EXAM
[General Appearance - Well Developed] : well developed [General Appearance - Well Nourished] : well nourished [Well Groomed] : well groomed [General Appearance - In No Acute Distress] : no acute distress [Abdomen Soft] : soft [Abdomen Tenderness] : non-tender [Costovertebral Angle Tenderness] : no ~M costovertebral angle tenderness [Urinary Bladder Findings] : the bladder was normal on palpation [Edema] : no peripheral edema [] : no respiratory distress [Respiration, Rhythm And Depth] : normal respiratory rhythm and effort [Exaggerated Use Of Accessory Muscles For Inspiration] : no accessory muscle use [Oriented To Time, Place, And Person] : oriented to person, place, and time [Affect] : the affect was normal [Mood] : the mood was normal [Not Anxious] : not anxious [FreeTextEntry1] : in wheel chair.  [No Focal Deficits] : no focal deficits [No Palpable Adenopathy] : no palpable adenopathy

## 2019-05-30 NOTE — HISTORY OF PRESENT ILLNESS
[FreeTextEntry1] : This patient is here with his aids.  His cardiologist had recommended that his tamsulosin be cut to once daily. This has been done and there seemed to be no effect from this.

## 2019-09-01 ENCOUNTER — EMERGENCY (EMERGENCY)
Facility: HOSPITAL | Age: 69
LOS: 0 days | Discharge: HOME | End: 2019-09-01
Attending: EMERGENCY MEDICINE
Payer: MEDICARE

## 2019-09-01 VITALS — HEIGHT: 69 IN | WEIGHT: 197.98 LBS

## 2019-09-01 VITALS
DIASTOLIC BLOOD PRESSURE: 70 MMHG | SYSTOLIC BLOOD PRESSURE: 110 MMHG | RESPIRATION RATE: 18 BRPM | TEMPERATURE: 98 F | HEART RATE: 77 BPM | OXYGEN SATURATION: 100 %

## 2019-09-01 DIAGNOSIS — F79 UNSPECIFIED INTELLECTUAL DISABILITIES: ICD-10-CM

## 2019-09-01 DIAGNOSIS — E11.9 TYPE 2 DIABETES MELLITUS WITHOUT COMPLICATIONS: ICD-10-CM

## 2019-09-01 DIAGNOSIS — K21.9 GASTRO-ESOPHAGEAL REFLUX DISEASE WITHOUT ESOPHAGITIS: ICD-10-CM

## 2019-09-01 DIAGNOSIS — Z98.890 OTHER SPECIFIED POSTPROCEDURAL STATES: Chronic | ICD-10-CM

## 2019-09-01 DIAGNOSIS — K52.9 NONINFECTIVE GASTROENTERITIS AND COLITIS, UNSPECIFIED: ICD-10-CM

## 2019-09-01 DIAGNOSIS — G20 PARKINSON'S DISEASE: ICD-10-CM

## 2019-09-01 DIAGNOSIS — Z79.84 LONG TERM (CURRENT) USE OF ORAL HYPOGLYCEMIC DRUGS: ICD-10-CM

## 2019-09-01 DIAGNOSIS — R31.9 HEMATURIA, UNSPECIFIED: ICD-10-CM

## 2019-09-01 DIAGNOSIS — N30.91 CYSTITIS, UNSPECIFIED WITH HEMATURIA: ICD-10-CM

## 2019-09-01 DIAGNOSIS — I10 ESSENTIAL (PRIMARY) HYPERTENSION: ICD-10-CM

## 2019-09-01 LAB
ALBUMIN SERPL ELPH-MCNC: 3.2 G/DL — LOW (ref 3.3–5)
ALP SERPL-CCNC: 51 U/L — SIGNIFICANT CHANGE UP (ref 40–120)
ALT FLD-CCNC: 9 U/L — LOW (ref 12–78)
ANION GAP SERPL CALC-SCNC: 7 MMOL/L — SIGNIFICANT CHANGE UP (ref 5–17)
APPEARANCE UR: ABNORMAL
APTT BLD: 40.8 SEC — HIGH (ref 27.5–36.3)
AST SERPL-CCNC: 15 U/L — SIGNIFICANT CHANGE UP (ref 15–37)
BASOPHILS # BLD AUTO: 0.03 K/UL — SIGNIFICANT CHANGE UP (ref 0–0.2)
BASOPHILS NFR BLD AUTO: 0.3 % — SIGNIFICANT CHANGE UP (ref 0–2)
BILIRUB SERPL-MCNC: 0.3 MG/DL — SIGNIFICANT CHANGE UP (ref 0.2–1.2)
BILIRUB UR-MCNC: NEGATIVE — SIGNIFICANT CHANGE UP
BUN SERPL-MCNC: 9 MG/DL — SIGNIFICANT CHANGE UP (ref 7–23)
CALCIUM SERPL-MCNC: 9 MG/DL — SIGNIFICANT CHANGE UP (ref 8.5–10.1)
CHLORIDE SERPL-SCNC: 99 MMOL/L — SIGNIFICANT CHANGE UP (ref 96–108)
CO2 SERPL-SCNC: 30 MMOL/L — SIGNIFICANT CHANGE UP (ref 22–31)
COLOR SPEC: YELLOW — SIGNIFICANT CHANGE UP
CREAT SERPL-MCNC: 0.73 MG/DL — SIGNIFICANT CHANGE UP (ref 0.5–1.3)
DIFF PNL FLD: ABNORMAL
EOSINOPHIL # BLD AUTO: 0.05 K/UL — SIGNIFICANT CHANGE UP (ref 0–0.5)
EOSINOPHIL NFR BLD AUTO: 0.6 % — SIGNIFICANT CHANGE UP (ref 0–6)
GLUCOSE SERPL-MCNC: 215 MG/DL — HIGH (ref 70–99)
GLUCOSE UR QL: 250 MG/DL
HCT VFR BLD CALC: 37.8 % — LOW (ref 39–50)
HGB BLD-MCNC: 13 G/DL — SIGNIFICANT CHANGE UP (ref 13–17)
IMM GRANULOCYTES NFR BLD AUTO: 0.4 % — SIGNIFICANT CHANGE UP (ref 0–1.5)
INR BLD: 1.06 RATIO — SIGNIFICANT CHANGE UP (ref 0.88–1.16)
KETONES UR-MCNC: ABNORMAL
LEUKOCYTE ESTERASE UR-ACNC: ABNORMAL
LYMPHOCYTES # BLD AUTO: 1.86 K/UL — SIGNIFICANT CHANGE UP (ref 1–3.3)
LYMPHOCYTES # BLD AUTO: 20.8 % — SIGNIFICANT CHANGE UP (ref 13–44)
MCHC RBC-ENTMCNC: 31.6 PG — SIGNIFICANT CHANGE UP (ref 27–34)
MCHC RBC-ENTMCNC: 34.4 GM/DL — SIGNIFICANT CHANGE UP (ref 32–36)
MCV RBC AUTO: 92 FL — SIGNIFICANT CHANGE UP (ref 80–100)
MONOCYTES # BLD AUTO: 0.64 K/UL — SIGNIFICANT CHANGE UP (ref 0–0.9)
MONOCYTES NFR BLD AUTO: 7.1 % — SIGNIFICANT CHANGE UP (ref 2–14)
NEUTROPHILS # BLD AUTO: 6.34 K/UL — SIGNIFICANT CHANGE UP (ref 1.8–7.4)
NEUTROPHILS NFR BLD AUTO: 70.8 % — SIGNIFICANT CHANGE UP (ref 43–77)
NITRITE UR-MCNC: NEGATIVE — SIGNIFICANT CHANGE UP
PH UR: 7 — SIGNIFICANT CHANGE UP (ref 5–8)
PLATELET # BLD AUTO: 194 K/UL — SIGNIFICANT CHANGE UP (ref 150–400)
POTASSIUM SERPL-MCNC: 4.5 MMOL/L — SIGNIFICANT CHANGE UP (ref 3.5–5.3)
POTASSIUM SERPL-SCNC: 4.5 MMOL/L — SIGNIFICANT CHANGE UP (ref 3.5–5.3)
PROT SERPL-MCNC: 6.2 GM/DL — SIGNIFICANT CHANGE UP (ref 6–8.3)
PROT UR-MCNC: 100 MG/DL
PROTHROM AB SERPL-ACNC: 11.8 SEC — SIGNIFICANT CHANGE UP (ref 10–12.9)
RBC # BLD: 4.11 M/UL — LOW (ref 4.2–5.8)
RBC # FLD: 11.9 % — SIGNIFICANT CHANGE UP (ref 10.3–14.5)
SODIUM SERPL-SCNC: 136 MMOL/L — SIGNIFICANT CHANGE UP (ref 135–145)
SP GR SPEC: 1.01 — SIGNIFICANT CHANGE UP (ref 1.01–1.02)
UROBILINOGEN FLD QL: NEGATIVE MG/DL — SIGNIFICANT CHANGE UP
WBC # BLD: 8.96 K/UL — SIGNIFICANT CHANGE UP (ref 3.8–10.5)
WBC # FLD AUTO: 8.96 K/UL — SIGNIFICANT CHANGE UP (ref 3.8–10.5)

## 2019-09-01 PROCEDURE — 74176 CT ABD & PELVIS W/O CONTRAST: CPT

## 2019-09-01 PROCEDURE — 85730 THROMBOPLASTIN TIME PARTIAL: CPT

## 2019-09-01 PROCEDURE — 74176 CT ABD & PELVIS W/O CONTRAST: CPT | Mod: 26

## 2019-09-01 PROCEDURE — 99284 EMERGENCY DEPT VISIT MOD MDM: CPT

## 2019-09-01 PROCEDURE — 80053 COMPREHEN METABOLIC PANEL: CPT

## 2019-09-01 PROCEDURE — 99284 EMERGENCY DEPT VISIT MOD MDM: CPT | Mod: 25

## 2019-09-01 PROCEDURE — 81001 URINALYSIS AUTO W/SCOPE: CPT

## 2019-09-01 PROCEDURE — 85610 PROTHROMBIN TIME: CPT

## 2019-09-01 PROCEDURE — 36415 COLL VENOUS BLD VENIPUNCTURE: CPT

## 2019-09-01 PROCEDURE — 87086 URINE CULTURE/COLONY COUNT: CPT

## 2019-09-01 PROCEDURE — 85025 COMPLETE CBC W/AUTO DIFF WBC: CPT

## 2019-09-01 RX ORDER — CEPHALEXIN 500 MG
500 CAPSULE ORAL ONCE
Refills: 0 | Status: COMPLETED | OUTPATIENT
Start: 2019-09-01 | End: 2019-09-01

## 2019-09-01 RX ORDER — MOXIFLOXACIN HYDROCHLORIDE TABLETS, 400 MG 400 MG/1
1 TABLET, FILM COATED ORAL
Qty: 20 | Refills: 0
Start: 2019-09-01 | End: 2019-09-10

## 2019-09-01 RX ORDER — METRONIDAZOLE 500 MG
1 TABLET ORAL
Qty: 21 | Refills: 0
Start: 2019-09-01 | End: 2019-09-07

## 2019-09-01 RX ADMIN — Medication 500 MILLIGRAM(S): at 14:10

## 2019-09-01 NOTE — ED ADULT NURSE NOTE - OBJECTIVE STATEMENT
PT BROUGHT TO ED FOR EVALUATION OF SUSPECTED UTI AND HEMATURIA. PT AT BASELINE MENTATION, HX OF MRFlorina DENIES ANY PAIN OR COMPLAINTS AT THIS TIME. MINIMAL AMOUNT OF BLOOD NOTED IN DIAPER. STRAIGHT CATH FOR URINE.

## 2019-09-01 NOTE — ED ADULT NURSE NOTE - TEMPLATE LIST FOR HEAD TO TOE ASSESSMENT
Reviewed d/c instructions with pt. Verbalized understanding. Changed dressing on lower back per Dr Dagoberto Gaona. Pt getting dressed.  at bedside. Will call for transport.   Electronically signed by Karine Herrera RN on 12/15/2018 at 1:57 PM  Symptoms

## 2019-09-01 NOTE — ED PROVIDER NOTE - OBJECTIVE STATEMENT
68 y/o male with a PMHx of DM, HTN, GERD, MR, seizures presents to the ED from group home due blood found in diaper this morning. Aide states pt's diaper was "full of blood" when she was changing pt this morning. Aide unsure if this blood is from stool or feces, but states it appears like it is from urine. Aide reports checking for scratches/abrasions. Takes 81 mg asa ever day. Aide unaware if pt has Hx of UTI, but was afebrile this morning. Pt is poor historian due to MR. 70 y/o male with a PMHx of DM, HTN, GERD, MR, seizures presents to the ED from group home due blood found in diaper this morning. Aide states pt's diaper was "full of blood" when she was changing pt this morning. Aide unsure if this blood is from urine or stool, but states it appears like it is from urine. Aide reports checking for scratches/abrasions. Takes 81 mg asa ever day. Aide unaware if pt has Hx of UTI, but states pt was afebrile this morning. Pt is poor historian due to MR.

## 2019-09-01 NOTE — ED PROVIDER NOTE - UNABLE TO OBTAIN
HPI is limited secondary to pt with MR. Severe Illness/Injury ROS is limited secondary to pt with MR

## 2019-09-01 NOTE — ED STATDOCS - PROGRESS NOTE DETAILS
Scribe CD for ED attending, Doctor Víctor 68 y/o male with a PMHx of BPH, GERD, DM, HTN, developmental disability presents to the ED from group home c/o blood in diaper this morning, unknown source. Aide at bedside states that pt is incontinent of urine and stool at baseline. Will send pt to main ED for further evaluation.

## 2019-09-01 NOTE — ED PROVIDER NOTE - PATIENT PORTAL LINK FT
You can access the FollowMyHealth Patient Portal offered by [facility] by registering at the following website: [email or web address]. By joining [employer]’s FollowMyHealth portal, you will also be able to view your health information using other applications (apps) compatible with our system.

## 2019-09-01 NOTE — ED PROVIDER NOTE - CLINICAL SUMMARY MEDICAL DECISION MAKING FREE TEXT BOX
Pt with hematuria, will check UA to rule-out hemorrhagic cystitis. If UA negative will check labs, coags and reassess.

## 2019-09-01 NOTE — ED ADULT NURSE NOTE - NSIMPLEMENTINTERV_GEN_ALL_ED
Implemented All Fall with Harm Risk Interventions:  [place] to call system. Call bell, personal items and telephone within reach. Instruct patient to call for assistance. Room bathroom lighting operational. Non-slip footwear when patient is off stretcher. Physically safe environment: no spills, clutter or unnecessary equipment. Stretcher in lowest position, wheels locked, appropriate side rails in place. Provide visual cue, wrist band, yellow gown, etc. Monitor gait and stability. Monitor for mental status changes and reorient to person, place, and time. Review medications for side effects contributing to fall risk. Reinforce activity limits and safety measures with patient and family. Provide visual clues: red socks.

## 2019-09-01 NOTE — ED PROVIDER NOTE - PROGRESS NOTE DETAILS
d/w RN at [facility] yobani [place] with plan, will give cipro + flagyl for stercoral colitis + cystitis, also recommend increased miralax dose for 1 week .

## 2019-09-01 NOTE — ED PROVIDER NOTE - NSFOLLOWUPINSTRUCTIONS_ED_ALL_ED_FT
PLease give cipro and flagyl as prescribed. Please give miralax 17g in juice or water twice daily.   ArabicOscarniaRocioan Godd[facility] HospitalogTraditional ChineseVietnamese    Urinary Tract Infection, Adult  Image   A urinary tract infection (UTI) is an infection of any part of the urinary tract, which includes the kidneys, ureters, bladder, and urethra. These organs make, store, and get rid of urine in the body. An upper UTI affects the ureters and kidneys (pyelonephritis), and a lower UTI affects the bladder (cystitis) and urethra (urethritis).    What are the causes?  Most urinary tract infections are caused by bacteria in your genital area, around the entrance to your urinary tract (urethra). These bacteria grow and cause irritation and inflammation of your urinary tract.    What increases the risk?  You are more likely to develop this condition if:  You have a urinary catheter that stays in place (indwelling).  You are not able to control when you urinate or have a bowel movement (you have incontinence).  You are female.  You have certain genes that increase your risk (genetics).  You are sexually active.  You are female and use a spermicide or diaphragm for birth control.  You take antibiotic medicines.  You have a situation that causes your flow of urine to slow down, such as:  An enlarged prostate.  Blockage in your urethra (stricture).  A kidney stone.  A neurogenic bladder.  Not getting enough to drink, or not urinating often.  You have certain medical conditions, such as:  Diabetes.  A weak disease-fighting system (immunesystem).  Estrogen deficiency.  Sickle cell disease.  Gout.  Spinal cord injury.  Pregnancy.  What are the signs or symptoms?  Symptoms of this condition include:  Needing to urinate right away (urgently).  Frequent urination or passing small amounts of urine frequently.  Pain or burning with urination.  Blood in the urine.  Urine that smells bad or unusual.  Trouble urinating.  Cloudy urine.  Vaginal discharge, if you are female.  Pain in the abdomen or the lower back.  You may also have:  Vomiting or a decreased appetite.  Confusion.  Irritability or tiredness.  A fever.  Diarrhea.  Older adults may not have any symptoms until the infection has worsened.    How is this diagnosed?  This condition is diagnosed with a medical history and physical exam. You will also need to provide a urine sample to test your urine. Other tests may be done, including:  Blood tests.  Sexually transmitted disease (STD) testing.  If you have had more than one UTI, a cystoscopy or imaging studies may be done to determine the cause of the infections.    How is this treated?  Treatment for this condition includes:  Antibiotic medicine.  Over-the-counter medicines to treat discomfort.  Drinking enough water to stay hydrated.  If you have frequent infections or have other conditions such as a kidney stone, you may need to see a health care provider who specializes in the urinary tract (urologist).    Some urinary tract infections that have worsened (sepsis) are treated in the hospital with IV antibiotics.    Follow these instructions at home:  Image   Take over-the-counter and prescription medicines only as told by your health care provider.  If you were prescribed an antibiotic, take it as told by your health care provider. Do not stop taking the antibiotic even if you start to feel better.  Drink enough fluid to keep your urine pale yellow. For most people, this is 6–10 glasses of water per day.  Keep all follow-up visits as told by your health care provider. This is important.  Make sure you:  Empty your bladder often and completely. Do not hold urine for long periods of time.  Empty your bladder after sex.  Wipe from front to back after a bowel movement if you are female. Use each tissue one time when you wipe.  Contact a health care provider if:  Your symptoms do not get better after 1–2 days.  Your symptoms go away and then return.  Get help right away if you have:  Severe pain in your back or your lower abdomen.  A fever.  Nausea or vomiting.  Summary  A urinary tract infection (UTI) is an infection of any part of the urinary tract, which includes the kidneys, ureters, bladder, and urethra.  Most urinary tract infections are caused by bacteria in your genital area, around the entrance to your urinary tract (urethra).  Treatment for this condition includes antibiotic medicines.  If you were prescribed an antibiotic, take it as told by your health care provider. Do not stop taking the antibiotic even if you start to feel better.  This information is not intended to replace advice given to you by your health care provider. Make sure you discuss any questions you have with your health care provider.    Document Released: 09/27/2006 Document Revised: 02/12/2019 Document Reviewed: 11/07/2016  Elsevier Interactive Patient Education © 2019 Elsevier Inc[employer]

## 2019-09-01 NOTE — ED ADULT TRIAGE NOTE - CHIEF COMPLAINT QUOTE
pt pmhx MR, alert, presents to er from group home for blood in urine, as per caretaker, blood was found in diaper, possible hematuria.  pt on baby aspirin.

## 2019-09-02 LAB
CULTURE RESULTS: NO GROWTH — SIGNIFICANT CHANGE UP
SPECIMEN SOURCE: SIGNIFICANT CHANGE UP

## 2019-09-13 ENCOUNTER — APPOINTMENT (OUTPATIENT)
Age: 69
End: 2019-09-13
Payer: MEDICARE

## 2019-09-13 ENCOUNTER — RX RENEWAL (OUTPATIENT)
Age: 69
End: 2019-09-13

## 2019-09-13 VITALS — SYSTOLIC BLOOD PRESSURE: 117 MMHG | OXYGEN SATURATION: 96 % | DIASTOLIC BLOOD PRESSURE: 83 MMHG | HEART RATE: 88 BPM

## 2019-09-13 DIAGNOSIS — R32 UNSPECIFIED URINARY INCONTINENCE: ICD-10-CM

## 2019-09-13 DIAGNOSIS — N39.0 URINARY TRACT INFECTION, SITE NOT SPECIFIED: ICD-10-CM

## 2019-09-13 PROCEDURE — 99213 OFFICE O/P EST LOW 20 MIN: CPT

## 2019-09-13 NOTE — PHYSICAL EXAM
[General Appearance - Well Nourished] : well nourished [General Appearance - Well Developed] : well developed [Well Groomed] : well groomed [General Appearance - In No Acute Distress] : no acute distress [Abdomen Tenderness] : non-tender [Abdomen Soft] : soft [Costovertebral Angle Tenderness] : no ~M costovertebral angle tenderness [Urinary Bladder Findings] : the bladder was normal on palpation [Edema] : no peripheral edema [] : no respiratory distress [Exaggerated Use Of Accessory Muscles For Inspiration] : no accessory muscle use [Respiration, Rhythm And Depth] : normal respiratory rhythm and effort [Oriented To Time, Place, And Person] : oriented to person, place, and time [Affect] : the affect was normal [Mood] : the mood was normal [Not Anxious] : not anxious [FreeTextEntry1] : in wheel chair.  [No Focal Deficits] : no focal deficits [No Palpable Adenopathy] : no palpable adenopathy

## 2019-09-13 NOTE — HISTORY OF PRESENT ILLNESS
[FreeTextEntry1] : This patient had a recent infection with hematuria. He is here with an 8 today. He is in diapers so it is difficult to quantify his bleeding. All is clear at this time

## 2019-10-01 ENCOUNTER — EMERGENCY (EMERGENCY)
Facility: HOSPITAL | Age: 69
LOS: 0 days | Discharge: HOME | End: 2019-10-01
Attending: EMERGENCY MEDICINE
Payer: MEDICARE

## 2019-10-01 VITALS
DIASTOLIC BLOOD PRESSURE: 74 MMHG | OXYGEN SATURATION: 96 % | TEMPERATURE: 98 F | SYSTOLIC BLOOD PRESSURE: 99 MMHG | RESPIRATION RATE: 16 BRPM | HEART RATE: 87 BPM | HEIGHT: 66 IN | WEIGHT: 160.06 LBS

## 2019-10-01 DIAGNOSIS — E87.1 HYPO-OSMOLALITY AND HYPONATREMIA: ICD-10-CM

## 2019-10-01 DIAGNOSIS — Z79.82 LONG TERM (CURRENT) USE OF ASPIRIN: ICD-10-CM

## 2019-10-01 DIAGNOSIS — Z98.890 OTHER SPECIFIED POSTPROCEDURAL STATES: Chronic | ICD-10-CM

## 2019-10-01 DIAGNOSIS — R73.9 HYPERGLYCEMIA, UNSPECIFIED: ICD-10-CM

## 2019-10-01 DIAGNOSIS — K21.9 GASTRO-ESOPHAGEAL REFLUX DISEASE WITHOUT ESOPHAGITIS: ICD-10-CM

## 2019-10-01 DIAGNOSIS — M81.0 AGE-RELATED OSTEOPOROSIS WITHOUT CURRENT PATHOLOGICAL FRACTURE: ICD-10-CM

## 2019-10-01 DIAGNOSIS — G20 PARKINSON'S DISEASE: ICD-10-CM

## 2019-10-01 DIAGNOSIS — E11.65 TYPE 2 DIABETES MELLITUS WITH HYPERGLYCEMIA: ICD-10-CM

## 2019-10-01 DIAGNOSIS — R62.50 UNSPECIFIED LACK OF EXPECTED NORMAL PHYSIOLOGICAL DEVELOPMENT IN CHILDHOOD: ICD-10-CM

## 2019-10-01 DIAGNOSIS — I10 ESSENTIAL (PRIMARY) HYPERTENSION: ICD-10-CM

## 2019-10-01 DIAGNOSIS — N40.0 BENIGN PROSTATIC HYPERPLASIA WITHOUT LOWER URINARY TRACT SYMPTOMS: ICD-10-CM

## 2019-10-01 LAB
ACETONE SERPL-MCNC: ABNORMAL
ALBUMIN SERPL ELPH-MCNC: 3.4 G/DL — SIGNIFICANT CHANGE UP (ref 3.3–5)
ALP SERPL-CCNC: 72 U/L — SIGNIFICANT CHANGE UP (ref 40–120)
ALT FLD-CCNC: 11 U/L — LOW (ref 12–78)
ANION GAP SERPL CALC-SCNC: 7 MMOL/L — SIGNIFICANT CHANGE UP (ref 5–17)
APPEARANCE UR: CLEAR — SIGNIFICANT CHANGE UP
AST SERPL-CCNC: 6 U/L — LOW (ref 15–37)
BASE EXCESS BLDV CALC-SCNC: 3.4 MMOL/L — HIGH (ref -2–2)
BASOPHILS # BLD AUTO: 0.02 K/UL — SIGNIFICANT CHANGE UP (ref 0–0.2)
BASOPHILS NFR BLD AUTO: 0.3 % — SIGNIFICANT CHANGE UP (ref 0–2)
BILIRUB SERPL-MCNC: 0.4 MG/DL — SIGNIFICANT CHANGE UP (ref 0.2–1.2)
BILIRUB UR-MCNC: NEGATIVE — SIGNIFICANT CHANGE UP
BUN SERPL-MCNC: 24 MG/DL — HIGH (ref 7–23)
CALCIUM SERPL-MCNC: 9.5 MG/DL — SIGNIFICANT CHANGE UP (ref 8.5–10.1)
CHLORIDE SERPL-SCNC: 101 MMOL/L — SIGNIFICANT CHANGE UP (ref 96–108)
CO2 SERPL-SCNC: 27 MMOL/L — SIGNIFICANT CHANGE UP (ref 22–31)
COLOR SPEC: YELLOW — SIGNIFICANT CHANGE UP
CREAT SERPL-MCNC: 1 MG/DL — SIGNIFICANT CHANGE UP (ref 0.5–1.3)
DIFF PNL FLD: NEGATIVE — SIGNIFICANT CHANGE UP
EOSINOPHIL # BLD AUTO: 0.05 K/UL — SIGNIFICANT CHANGE UP (ref 0–0.5)
EOSINOPHIL NFR BLD AUTO: 0.7 % — SIGNIFICANT CHANGE UP (ref 0–6)
GLUCOSE SERPL-MCNC: 472 MG/DL — CRITICAL HIGH (ref 70–99)
GLUCOSE UR QL: 1000 MG/DL
HCO3 BLDV-SCNC: 27 MMOL/L — SIGNIFICANT CHANGE UP (ref 21–29)
HCT VFR BLD CALC: 36.7 % — LOW (ref 39–50)
HGB BLD-MCNC: 13 G/DL — SIGNIFICANT CHANGE UP (ref 13–17)
IMM GRANULOCYTES NFR BLD AUTO: 0.4 % — SIGNIFICANT CHANGE UP (ref 0–1.5)
KETONES UR-MCNC: ABNORMAL
LEUKOCYTE ESTERASE UR-ACNC: NEGATIVE — SIGNIFICANT CHANGE UP
LYMPHOCYTES # BLD AUTO: 1.41 K/UL — SIGNIFICANT CHANGE UP (ref 1–3.3)
LYMPHOCYTES # BLD AUTO: 19.9 % — SIGNIFICANT CHANGE UP (ref 13–44)
MCHC RBC-ENTMCNC: 31 PG — SIGNIFICANT CHANGE UP (ref 27–34)
MCHC RBC-ENTMCNC: 35.4 GM/DL — SIGNIFICANT CHANGE UP (ref 32–36)
MCV RBC AUTO: 87.4 FL — SIGNIFICANT CHANGE UP (ref 80–100)
MONOCYTES # BLD AUTO: 0.55 K/UL — SIGNIFICANT CHANGE UP (ref 0–0.9)
MONOCYTES NFR BLD AUTO: 7.8 % — SIGNIFICANT CHANGE UP (ref 2–14)
NEUTROPHILS # BLD AUTO: 5.03 K/UL — SIGNIFICANT CHANGE UP (ref 1.8–7.4)
NEUTROPHILS NFR BLD AUTO: 70.9 % — SIGNIFICANT CHANGE UP (ref 43–77)
NITRITE UR-MCNC: NEGATIVE — SIGNIFICANT CHANGE UP
PCO2 BLDV: 41 MMHG — SIGNIFICANT CHANGE UP (ref 35–50)
PH BLDV: 7.44 — SIGNIFICANT CHANGE UP (ref 7.35–7.45)
PH UR: 6 — SIGNIFICANT CHANGE UP (ref 5–8)
PLATELET # BLD AUTO: 151 K/UL — SIGNIFICANT CHANGE UP (ref 150–400)
PO2 BLDV: 145 MMHG — HIGH (ref 25–45)
POTASSIUM SERPL-MCNC: 4.1 MMOL/L — SIGNIFICANT CHANGE UP (ref 3.5–5.3)
POTASSIUM SERPL-SCNC: 4.1 MMOL/L — SIGNIFICANT CHANGE UP (ref 3.5–5.3)
PROT SERPL-MCNC: 6.3 GM/DL — SIGNIFICANT CHANGE UP (ref 6–8.3)
PROT UR-MCNC: NEGATIVE MG/DL — SIGNIFICANT CHANGE UP
RBC # BLD: 4.2 M/UL — SIGNIFICANT CHANGE UP (ref 4.2–5.8)
RBC # FLD: 12 % — SIGNIFICANT CHANGE UP (ref 10.3–14.5)
SAO2 % BLDV: 99 % — HIGH (ref 67–88)
SODIUM SERPL-SCNC: 135 MMOL/L — SIGNIFICANT CHANGE UP (ref 135–145)
SP GR SPEC: 1.01 — SIGNIFICANT CHANGE UP (ref 1.01–1.02)
UROBILINOGEN FLD QL: NEGATIVE MG/DL — SIGNIFICANT CHANGE UP
WBC # BLD: 7.09 K/UL — SIGNIFICANT CHANGE UP (ref 3.8–10.5)
WBC # FLD AUTO: 7.09 K/UL — SIGNIFICANT CHANGE UP (ref 3.8–10.5)

## 2019-10-01 PROCEDURE — 99284 EMERGENCY DEPT VISIT MOD MDM: CPT

## 2019-10-01 PROCEDURE — 85025 COMPLETE CBC W/AUTO DIFF WBC: CPT

## 2019-10-01 PROCEDURE — 87086 URINE CULTURE/COLONY COUNT: CPT

## 2019-10-01 PROCEDURE — 93010 ELECTROCARDIOGRAM REPORT: CPT

## 2019-10-01 PROCEDURE — 99283 EMERGENCY DEPT VISIT LOW MDM: CPT | Mod: 25

## 2019-10-01 PROCEDURE — 36415 COLL VENOUS BLD VENIPUNCTURE: CPT

## 2019-10-01 PROCEDURE — 93005 ELECTROCARDIOGRAM TRACING: CPT

## 2019-10-01 PROCEDURE — 81003 URINALYSIS AUTO W/O SCOPE: CPT

## 2019-10-01 PROCEDURE — 96372 THER/PROPH/DIAG INJ SC/IM: CPT | Mod: XU

## 2019-10-01 PROCEDURE — 82803 BLOOD GASES ANY COMBINATION: CPT

## 2019-10-01 PROCEDURE — 82962 GLUCOSE BLOOD TEST: CPT

## 2019-10-01 PROCEDURE — 82009 KETONE BODYS QUAL: CPT

## 2019-10-01 PROCEDURE — 80053 COMPREHEN METABOLIC PANEL: CPT

## 2019-10-01 PROCEDURE — 96360 HYDRATION IV INFUSION INIT: CPT

## 2019-10-01 RX ORDER — INSULIN LISPRO 100/ML
7 VIAL (ML) SUBCUTANEOUS ONCE
Refills: 0 | Status: COMPLETED | OUTPATIENT
Start: 2019-10-01 | End: 2019-10-01

## 2019-10-01 RX ORDER — SODIUM CHLORIDE 9 MG/ML
2000 INJECTION INTRAMUSCULAR; INTRAVENOUS; SUBCUTANEOUS ONCE
Refills: 0 | Status: COMPLETED | OUTPATIENT
Start: 2019-10-01 | End: 2019-10-01

## 2019-10-01 RX ADMIN — Medication 7 UNIT(S): at 18:38

## 2019-10-01 RX ADMIN — SODIUM CHLORIDE 2000 MILLILITER(S): 9 INJECTION INTRAMUSCULAR; INTRAVENOUS; SUBCUTANEOUS at 18:39

## 2019-10-01 RX ADMIN — SODIUM CHLORIDE 2000 MILLILITER(S): 9 INJECTION INTRAMUSCULAR; INTRAVENOUS; SUBCUTANEOUS at 17:20

## 2019-10-01 NOTE — ED PROVIDER NOTE - PATIENT PORTAL LINK FT
You can access the FollowMyHealth Patient Portal offered by [facility] by registering at the following website: [email or web address]. By joining [employer]’s FollowMyHealth portal, you will also be able to view your health information using other applications (apps) compatible with our system.

## 2019-10-01 NOTE — ED ADULT TRIAGE NOTE - CHIEF COMPLAINT QUOTE
Pt BIBA from group home with report of hyperglycemia when checking BGM.  As per EMS, pt is at baseline with no complaints at this time.

## 2019-10-01 NOTE — ED ADULT NURSE NOTE - NSFALLRSKOUTCOME_ED_ALL_ED
ICU Hospitalist Progress Note                    Patient Name: Stephanie De Leon  [id number]  Admit Date: 5/17/2019  ICU Day 2  LOS 2     PCP: No primary care provider on file. Brief Overview: 63 y.o. female     Chief Complaint: Shortness of breath. Subjective / History of present illness:  Patient still requiring ventilatory support with 45% FIO2 and PEEP 12. She opens eyes and tried to communicate. Cumulative hospital history:   Transfer from [facility] due to not improving respiratory failure suspected LLL PNA with possible ARDS and diastolic HF. She required intubation in transfer and was evaluated by pulmonology consultant. REVIEW OF SYSTEMS:  Unable to obtain    OBJECTIVE:     Ventilator Settings:   Vent Information  $Ventilation: $Subsequent Day  Ventilator Started: Yes  Vent Mode: AC/VC  Vt Ordered: 420 mL  Rate Set: 20 bmp  FiO2 : 45 %  Sensitivity: 5  PEEP/CPAP: 12  I Time/ I Time %: 0.7 s  Additional Respiratory  Assessments  Pulse: 68  Resp: 19  SpO2: 99 %  Oral Care: Mouth suctioned, Mouth moisturizer, Mouth swabbed, Lip moisturizer applied    IV Access: Port   Vasopressors: Not required currently   Diet:.  NPO  Antibiotics: Cefepime/Levaquin/Vancomycin    Prophylaxis:  DVT - Lovenox full dose  GI - Protonix    Current Medications:  Current Facility-Administered Medications   Medication Dose Route Frequency Provider Last Rate Last Dose    [START ON 5/20/2019] vancomycin (VANCOCIN) 1000 mg in dextrose 5% 200 mL IVPB  1,000 mg Intravenous Q18H Nati Hendricks MD        midazolam (VERSED) injection 1 mg  1 mg Intravenous Q2H PRN Nati Hendricks MD        dextrose 5 % and 0.45 % NaCl with KCl 20 mEq infusion   Intravenous Continuous Nati Hendricks  mL/hr at 05/19/19 1348      methylPREDNISolone sodium (SOLU-MEDROL) injection 40 mg  40 mg Intravenous Q12H Dulce Maria Perez MD   40 mg at 05/19/19 0931    albuterol (PROVENTIL) nebulizer solution 2.5 mg  2.5 mg Nebulization Q4H PRN Amanda Allison MD        ipratropium-albuterol (DUONEB) nebulizer solution 1 ampule  1 ampule Inhalation Q4H Amanda Allison MD   1 ampule at 05/19/19 1022    ceFEPIme (MAXIPIME) 2 g in sterile water 20 mL IV syringe  2 g Intravenous Q8H Amanda Allison MD   2 g at 05/19/19 0900    And    levofloxacin (LEVAQUIN) 750 MG/150ML infusion 750 mg  750 mg Intravenous Q24H Amanda Allison MD   Stopped at 05/18/19 1849    sodium chloride flush 0.9 % injection 10 mL  10 mL Intravenous 2 times per day Amanda Allison MD   10 mL at 05/19/19 0929    sodium chloride flush 0.9 % injection 10 mL  10 mL Intravenous PRN Amanda Allison MD        acetaminophen (TYLENOL) suppository 650 mg  650 mg Rectal Q4H PRN Amanda Allison MD        vancomycin Yamila Garsia) intermittent dosing (placeholder)   Other RX Placeholder Amanda Allison MD        enoxaparin (LOVENOX) injection 80 mg  1 mg/kg Subcutaneous BID Amanda Allison MD   80 mg at 05/19/19 0900    polyvinyl alcohol (LIQUIFILM TEARS) 1.4 % ophthalmic solution 1 drop  1 drop Both Eyes Q4H Shiv Burciaga MD   1 drop at 05/19/19 1102    And    AKWA TEARS (LACRILUBE) 2-15-83 % opthalmic ointment   Both Eyes Q4H Shiv Burciaga MD        famotidine (PEPCID) injection 20 mg  20 mg Intravenous BID Shiv Burciaga MD   20 mg at 05/19/19 0932    chlorhexidine (PERIDEX) 0.12 % solution 15 mL  15 mL Mouth/Throat BID Shiv Burciaga MD   15 mL at 05/19/19 0932    morphine (PF) injection 2 mg  2 mg Intravenous Q1H PRN Shiv Burciaga MD   2 mg at 05/18/19 1911    propofol 1000 MG/100ML injection  10 mcg/kg/min Intravenous Titrated Shiv Burciaga MD 24.3 mL/hr at 05/19/19 1349 50 mcg/kg/min at 05/19/19 1349       Physical Exam:  BP (!) 95/56   Pulse 68   Temp 97.5 °F (36.4 °C) (Temporal)   Resp 19   Ht 5' 1.02\" (1.55 m)   Wt 180 lb 12.8 oz (82 kg)   SpO2 99%   BMI 34.13 kg/m²   24hr I & O:      Intake/Output Summary (Last 24 hours) at 5/19/2019 1413  Last data filed at 5/19/2019 1200  Gross per 24 hour   Intake 2895.14 ml   Output 975 ml   Net 1920.14 ml     Physical Exam   Constitutional: She appears well-developed and well-nourished. No distress. HENT:   Head: Normocephalic and atraumatic. Right Ear: External ear normal.   Left Ear: External ear normal.   Nose: Nose normal.   Eyes: Pupils are equal, round, and reactive to light. EOM are normal. Right eye exhibits no discharge. Left eye exhibits no discharge. Neck: Normal range of motion. Neck supple. No JVD present. No thyromegaly present. Cardiovascular: Normal rate and normal heart sounds. No murmur heard. Pulmonary/Chest: No stridor. No respiratory distress. She has no wheezes. Intubated   Abdominal: Soft. Bowel sounds are normal. She exhibits no distension and no mass. There is no tenderness. No hernia. Musculoskeletal: Normal range of motion. She exhibits no edema or deformity. Neurological: She displays normal reflexes. No cranial nerve deficit or sensory deficit. Opens eyes, non focal   Skin: Skin is warm. Capillary refill takes less than 2 seconds. She is not diaphoretic. No erythema. No pallor. Labs:   Recent Labs     05/18/19  0455 05/19/19  0535   WBC 6.4 9.1   RBC 3.76* 3.74*   HGB 11.3* 11.1*   HCT 35.2* 35.3*    149     Recent Labs     05/17/19  1644 05/18/19  0455 05/18/19  0507 05/19/19  0458 05/19/19  0535   * 152*  --   --  144   K 3.3* 3.4* 3.2 3.7 3.8   ANIONGAP 10 13  --   --  8    111  --   --  108   CO2 31* 28  --   --  28   BUN 17 21  --   --  26*   CREATININE 0.6 0.7  --   --  0.8   GLUCOSE 126* 124*  --   --  146*   CALCIUM 8.4* 8.3*  --   --  8.3*     Recent Labs     05/17/19  1644 05/18/19  0455   MG 2.5* 2.7*     Recent Labs     05/17/19  1644   AST 21   ALT 8   BILITOT 1.0   ALKPHOS 66     Cortisol 30.7 ug/dL    HgBA1c:  No components found for: HGBA1C  Troponin T: No results for input(s): TROPONINI in the last 72 hours.   Pro-BNP: No results for input(s): BNP in the last 72 hours. INR: No results for input(s): INR in the last 72 hours. ABGs:   Lab Results   Component Value Date    PHART 7.440 05/19/2019    PO2ART 57.0 05/19/2019    LRG2GZK 42.0 05/19/2019     UA:No results for input(s): NITRITE, COLORU, PHUR, LABCAST, WBCUA, RBCUA, MUCUS, TRICHOMONAS, YEAST, BACTERIA, CLARITYU, SPECGRAV, LEUKOCYTESUR, UROBILINOGEN, BILIRUBINUR, BLOODU, GLUCOSEU, AMORPHOUS in the last 72 hours. Invalid input(s): Divina Damon    RAD: .  CXR 5/17/19  The distal end of the endotracheal tube is 5 cm above  trachea bifurcation. This is an optimal position. Extensive interstitial infiltrate in the lungs bilaterally more  severely in the left lower lung. This may represent acute or chronic  interstitial pneumonitis. A MediPort system in place. Above findings are recorded on a digital voice clip in PACS.     EKG/Telemetry: Sinus rhythm    Echo: Noted    Micro:   Last culture of record:     Blood Culture:   Recent Labs     05/17/19  1637 05/17/19  1745   BC Gram stain Aerobic bottle:  Gram positive cocci in clusters  resembling Staphylococcus  Culture in progress  Please notify Physician  *  No further workup  Isolated from Aerobic bottle    --    BLOODCULT2  --  Gram stain Aerobic and Anaerobic bottles:  Gram positive cocci in chains and/or pairs  resembling Streptococcus  Culture in progress  Please notify Physician  *  ID and sensitivity to follow  Isolated from Aerobic and Anaerobic bottle     ORG Staphylococcus coagulase-negative* Streptococcus species*       GRAM STAIN  Recent Labs     05/17/19  1745 05/19/19  0245   LABGRAM  --  No Epithelial Cells seen  Many WBC's (Polymorphonuclear)  Few Mixed bacterial morphotypes suggestive of  Normal respiratory jimena     ORG Streptococcus species*  --        Resp Culture Brief : No results found for: CULTRESP    Body Fluid : No results found for: BFCX    MRSA : No results found for: [facility]    Urine Culture Brief : No results found for: LABURIN     Organism Brief :   Lab Results   Component Value Date    ORG Streptococcus species 05/17/2019         30 Day lookback of cultures:    Blood Culture Recent:   Recent Labs     05/17/19  1637   BC Gram stain Aerobic bottle:  Gram positive cocci in clusters  resembling Staphylococcus  Culture in progress  Please notify Physician  *  No further workup  Isolated from Aerobic bottle         Gram Stain Recent:   Recent Labs     05/19/19  0245   LABGRAM No Epithelial Cells seen  Many WBC's (Polymorphonuclear)  Few Mixed bacterial morphotypes suggestive of  Normal respiratory jimena         Resp Culture Recent: No results for input(s): CULTRESP in the last 720 hours. Body Fluid Recent : No results for input(s): BFCX in the last 720 hours. MRSA Recent : No results for input(s): [address] in the last 720 hours. Urine Culture Recent : No results for input(s): LABURIN in the last 720 hours. Organism Recent :   Recent Labs     05/17/19  1745   ORG Streptococcus species*          IMPRESSION / PLAN:  Principal Problem:    Acute on chronic respiratory failure with hypoxia and hypercapnia ([facility])  Active Problems:    Left lower lobe pneumonia (HCC)    Chronic atrial fibrillation ([facility])    Personal history of PE (pulmonary embolism)    COPD (chronic obstructive pulmonary disease) ([facility])    Chronic diastolic congestive heart failure (HCC)    ARDS (adult respiratory distress syndrome) ([facility] [address]    Bacteremia  Resolved Problems:    * No resolved hospital problems. *      Continue invasive ventilatory support, consider O2 dose decrease. Sedated with Propofol tolerated well. Continue antibiotics and adjust for cultures  ARDS possible > low tidal volume ventilation. May require prolonged ventilation. COPD history but appears not exacerbated. Wean steroids slowly. Positive blood cultures with a contaminant from peripheral and streptococcus from port. Repeated today, consider ID consult.     Afib remains rate controlled. Lovenox full dose for Afib/DVT/PE history. Prognosis guarded  Pulmonology input noted  Cortisol is over 20 ug/dl Adrenal insufficiency is ruled out    Pt discussed with ICU team during rounds. CC time excluding procedures: 49 minutes    Patient is critically ill with a high probability of clinically significant/life threatening deteriation in the patient's condition without intervention. Critical care time evaluating patient, review of records, reassessing, discussion with other physicians/family/nursing, ordering and reviewing lab/radiographs/outside studies is 49 minutes, independent of any separately billable procedures. Code Status: Full Code       Luiz Steinberg M.D.   Hospitalist service  5/19/2019  2:13 PM Fall Risk

## 2019-10-01 NOTE — ED PROVIDER NOTE - PMH
Aortic root dilation    BPH (benign prostatic hyperplasia)    Chronic GERD    Developmental disability    Diabetes    Hypertension    Hyponatremia    Incontinence    Osteoporosis    Parkinson disease    Seizures    SIADH (syndrome of inappropriate ADH production)    UTI (urinary tract infection)

## 2019-10-01 NOTE — ED PROVIDER NOTE - OBJECTIVE STATEMENT
70 y/o male with PMHx of MR, GERD, developmental disability, HTN, DM, OP, Parkinson disease, seizures, and SIADH presents to the ED with aide c/o +high blood sugar. Notes blood sugar in the 500s. Pt has no other complaints at this time. No N/V, fever, SOB, or CP. Never a smoker. NKDA. PCP: Dr. Shirley Gaston.

## 2019-10-01 NOTE — ED ADULT NURSE NOTE - NSIMPLEMENTINTERV_GEN_ALL_ED
Implemented All Fall Risk Interventions:  [place] to call system. Call bell, personal items and telephone within reach. Instruct patient to call for assistance. Room bathroom lighting operational. Non-slip footwear when patient is off stretcher. Physically safe environment: no spills, clutter or unnecessary equipment. Stretcher in lowest position, wheels locked, appropriate side rails in place. Provide visual cue, wrist band, yellow gown, etc. Monitor gait and stability. Monitor for mental status changes and reorient to person, place, and time. Review medications for side effects contributing to fall risk. Reinforce activity limits and safety measures with patient and family.

## 2019-10-02 LAB
CULTURE RESULTS: SIGNIFICANT CHANGE UP
SPECIMEN SOURCE: SIGNIFICANT CHANGE UP

## 2019-10-03 ENCOUNTER — EMERGENCY (EMERGENCY)
Facility: HOSPITAL | Age: 69
LOS: 0 days | Discharge: HOME | End: 2019-10-03
Attending: EMERGENCY MEDICINE
Payer: MEDICARE

## 2019-10-03 VITALS
HEART RATE: 76 BPM | OXYGEN SATURATION: 97 % | SYSTOLIC BLOOD PRESSURE: 107 MMHG | DIASTOLIC BLOOD PRESSURE: 77 MMHG | RESPIRATION RATE: 16 BRPM | TEMPERATURE: 98 F

## 2019-10-03 VITALS
HEIGHT: 68 IN | SYSTOLIC BLOOD PRESSURE: 94 MMHG | HEART RATE: 78 BPM | DIASTOLIC BLOOD PRESSURE: 68 MMHG | OXYGEN SATURATION: 95 % | RESPIRATION RATE: 17 BRPM | WEIGHT: 179.9 LBS | TEMPERATURE: 98 F

## 2019-10-03 DIAGNOSIS — E11.65 TYPE 2 DIABETES MELLITUS WITH HYPERGLYCEMIA: ICD-10-CM

## 2019-10-03 DIAGNOSIS — Z79.4 LONG TERM (CURRENT) USE OF INSULIN: ICD-10-CM

## 2019-10-03 DIAGNOSIS — I10 ESSENTIAL (PRIMARY) HYPERTENSION: ICD-10-CM

## 2019-10-03 DIAGNOSIS — Z98.890 OTHER SPECIFIED POSTPROCEDURAL STATES: Chronic | ICD-10-CM

## 2019-10-03 DIAGNOSIS — F79 UNSPECIFIED INTELLECTUAL DISABILITIES: ICD-10-CM

## 2019-10-03 DIAGNOSIS — G20 PARKINSON'S DISEASE: ICD-10-CM

## 2019-10-03 LAB
ALBUMIN SERPL ELPH-MCNC: 3.1 G/DL — LOW (ref 3.3–5)
ALP SERPL-CCNC: 64 U/L — SIGNIFICANT CHANGE UP (ref 40–120)
ALT FLD-CCNC: <6 U/L — LOW (ref 12–78)
ANION GAP SERPL CALC-SCNC: 6 MMOL/L — SIGNIFICANT CHANGE UP (ref 5–17)
AST SERPL-CCNC: 16 U/L — SIGNIFICANT CHANGE UP (ref 15–37)
BASOPHILS # BLD AUTO: 0.03 K/UL — SIGNIFICANT CHANGE UP (ref 0–0.2)
BASOPHILS NFR BLD AUTO: 0.5 % — SIGNIFICANT CHANGE UP (ref 0–2)
BILIRUB SERPL-MCNC: 0.6 MG/DL — SIGNIFICANT CHANGE UP (ref 0.2–1.2)
BUN SERPL-MCNC: 17 MG/DL — SIGNIFICANT CHANGE UP (ref 7–23)
CALCIUM SERPL-MCNC: 8.8 MG/DL — SIGNIFICANT CHANGE UP (ref 8.5–10.1)
CHLORIDE SERPL-SCNC: 101 MMOL/L — SIGNIFICANT CHANGE UP (ref 96–108)
CO2 SERPL-SCNC: 27 MMOL/L — SIGNIFICANT CHANGE UP (ref 22–31)
CREAT SERPL-MCNC: 0.88 MG/DL — SIGNIFICANT CHANGE UP (ref 0.5–1.3)
EOSINOPHIL # BLD AUTO: 0.09 K/UL — SIGNIFICANT CHANGE UP (ref 0–0.5)
EOSINOPHIL NFR BLD AUTO: 1.5 % — SIGNIFICANT CHANGE UP (ref 0–6)
GLUCOSE SERPL-MCNC: 407 MG/DL — HIGH (ref 70–99)
HCT VFR BLD CALC: 35.4 % — LOW (ref 39–50)
HGB BLD-MCNC: 12.2 G/DL — LOW (ref 13–17)
IMM GRANULOCYTES NFR BLD AUTO: 0.8 % — SIGNIFICANT CHANGE UP (ref 0–1.5)
LYMPHOCYTES # BLD AUTO: 1.74 K/UL — SIGNIFICANT CHANGE UP (ref 1–3.3)
LYMPHOCYTES # BLD AUTO: 29.4 % — SIGNIFICANT CHANGE UP (ref 13–44)
MCHC RBC-ENTMCNC: 30.6 PG — SIGNIFICANT CHANGE UP (ref 27–34)
MCHC RBC-ENTMCNC: 34.5 GM/DL — SIGNIFICANT CHANGE UP (ref 32–36)
MCV RBC AUTO: 88.7 FL — SIGNIFICANT CHANGE UP (ref 80–100)
MONOCYTES # BLD AUTO: 0.52 K/UL — SIGNIFICANT CHANGE UP (ref 0–0.9)
MONOCYTES NFR BLD AUTO: 8.8 % — SIGNIFICANT CHANGE UP (ref 2–14)
NEUTROPHILS # BLD AUTO: 3.49 K/UL — SIGNIFICANT CHANGE UP (ref 1.8–7.4)
NEUTROPHILS NFR BLD AUTO: 59 % — SIGNIFICANT CHANGE UP (ref 43–77)
PLATELET # BLD AUTO: 139 K/UL — LOW (ref 150–400)
POTASSIUM SERPL-MCNC: 4.6 MMOL/L — SIGNIFICANT CHANGE UP (ref 3.5–5.3)
POTASSIUM SERPL-SCNC: 4.6 MMOL/L — SIGNIFICANT CHANGE UP (ref 3.5–5.3)
PROT SERPL-MCNC: 5.8 GM/DL — LOW (ref 6–8.3)
RBC # BLD: 3.99 M/UL — LOW (ref 4.2–5.8)
RBC # FLD: 11.9 % — SIGNIFICANT CHANGE UP (ref 10.3–14.5)
SODIUM SERPL-SCNC: 134 MMOL/L — LOW (ref 135–145)
WBC # BLD: 5.92 K/UL — SIGNIFICANT CHANGE UP (ref 3.8–10.5)
WBC # FLD AUTO: 5.92 K/UL — SIGNIFICANT CHANGE UP (ref 3.8–10.5)

## 2019-10-03 PROCEDURE — 96374 THER/PROPH/DIAG INJ IV PUSH: CPT

## 2019-10-03 PROCEDURE — 96361 HYDRATE IV INFUSION ADD-ON: CPT

## 2019-10-03 PROCEDURE — 82962 GLUCOSE BLOOD TEST: CPT

## 2019-10-03 PROCEDURE — 99284 EMERGENCY DEPT VISIT MOD MDM: CPT

## 2019-10-03 PROCEDURE — 80053 COMPREHEN METABOLIC PANEL: CPT

## 2019-10-03 PROCEDURE — 85025 COMPLETE CBC W/AUTO DIFF WBC: CPT

## 2019-10-03 PROCEDURE — 36415 COLL VENOUS BLD VENIPUNCTURE: CPT

## 2019-10-03 PROCEDURE — 99284 EMERGENCY DEPT VISIT MOD MDM: CPT | Mod: 25

## 2019-10-03 RX ORDER — SODIUM CHLORIDE 9 MG/ML
1000 INJECTION INTRAMUSCULAR; INTRAVENOUS; SUBCUTANEOUS ONCE
Refills: 0 | Status: COMPLETED | OUTPATIENT
Start: 2019-10-03 | End: 2019-10-03

## 2019-10-03 RX ORDER — INSULIN HUMAN 100 [IU]/ML
8 INJECTION, SOLUTION SUBCUTANEOUS ONCE
Refills: 0 | Status: COMPLETED | OUTPATIENT
Start: 2019-10-03 | End: 2019-10-03

## 2019-10-03 RX ORDER — SODIUM CHLORIDE 9 MG/ML
2000 INJECTION INTRAMUSCULAR; INTRAVENOUS; SUBCUTANEOUS ONCE
Refills: 0 | Status: COMPLETED | OUTPATIENT
Start: 2019-10-03 | End: 2019-10-03

## 2019-10-03 RX ADMIN — SODIUM CHLORIDE 1000 MILLILITER(S): 9 INJECTION INTRAMUSCULAR; INTRAVENOUS; SUBCUTANEOUS at 15:46

## 2019-10-03 RX ADMIN — SODIUM CHLORIDE 2000 MILLILITER(S): 9 INJECTION INTRAMUSCULAR; INTRAVENOUS; SUBCUTANEOUS at 13:50

## 2019-10-03 RX ADMIN — SODIUM CHLORIDE 4000 MILLILITER(S): 9 INJECTION INTRAMUSCULAR; INTRAVENOUS; SUBCUTANEOUS at 10:01

## 2019-10-03 RX ADMIN — SODIUM CHLORIDE 1000 MILLILITER(S): 9 INJECTION INTRAMUSCULAR; INTRAVENOUS; SUBCUTANEOUS at 14:46

## 2019-10-03 RX ADMIN — INSULIN HUMAN 8 UNIT(S): 100 INJECTION, SOLUTION SUBCUTANEOUS at 13:50

## 2019-10-03 NOTE — ED PROVIDER NOTE - CLINICAL SUMMARY MEDICAL DECISION MAKING FREE TEXT BOX
Pt presents with elevated glucose over 400 at group home without any complaints. Pt is at baseline per group home staff and EMS. Plan: check electrolytes give IV fluid and do repeat glucose levels and if still elevated give insulin and f/u with PCP or endocrinology.

## 2019-10-03 NOTE — ED PROVIDER NOTE - PMH
Pt meeting PACU discharge criteria  Will transfer to ICU  Report called to [facility] in ICU  Aortic root dilation    BPH (benign prostatic hyperplasia)    Chronic GERD    Developmental disability    Diabetes    Hypertension    Hyponatremia    Incontinence    Osteoporosis    Parkinson disease    Seizures    SIADH (syndrome of inappropriate ADH production)    UTI (urinary tract infection)

## 2019-10-03 NOTE — ED ADULT NURSE NOTE - OBJECTIVE STATEMENT
pt presents to ED with developmental delay, accompanied by aide. as per EMS pt had 15lbs weight loss. pt recently had blood sugars that were "low", so pt taken off insulin by PMD. pt hyperglycemic at this time. pt ate cheerios for breakfast.

## 2019-10-03 NOTE — ED ADULT NURSE NOTE - NSIMPLEMENTINTERV_GEN_ALL_ED
Implemented All Fall Risk Interventions:  [place] to call system. Call bell, personal items and telephone within reach. Instruct patient to call for assistance. Room bathroom lighting operational. Non-slip footwear when patient is off stretcher. Physically safe environment: no spills, clutter or unnecessary equipment. Stretcher in lowest position, wheels locked, appropriate side rails in place. Provide visual cue, wrist band, yellow gown, etc. Monitor gait and stability. Monitor for mental status changes and reorient to person, place, and time. Review medications for side effects contributing to fall risk. Reinforce activity limits and safety measures with patient and family.

## 2019-10-03 NOTE — ED PROVIDER NOTE - PATIENT PORTAL LINK FT
You can access the FollowMyHealth Patient Portal offered by [facility] by registering at the following website: [email or web address]. By joining [facility]’s FollowMyHealth portal, you will also be able to view your health information using other applications (apps) compatible with our system.

## 2019-10-03 NOTE — ED PROVIDER NOTE - OBJECTIVE STATEMENT
70 y/o male with a PMHx of aortic root dilation, BPH, GERD, HTN, hyponatremia, osteoporosis, parkinson's disease, seizure, DM, developmental disability presents to the ED c/o . History obtained by EMS because pt has limited vocabulary. Pt is at baseline. Pt has lost 15 pounds and his blood sugar was to low so they took pt off insulin. Pt insulin went up over 400 so they sent pt to ED. Pt ate cheerio's for breakfast.

## 2019-10-03 NOTE — ED ADULT NURSE NOTE - CHIEF COMPLAINT QUOTE
patient brought in by EMS from [facility] for hyperglycemia. on arrival, patient's . no complaints - denies chest pain, shortness of breath, headache, dizziness, nausea, vomiting or pain. no acute distress noted on arrival. as per papers from [facility], patient is on Januvia and Metformin. EMS states patient stopped insulin about a month ago.

## 2019-10-03 NOTE — ED PROVIDER NOTE - UNABLE TO OBTAIN
Pt hx unobtainable due to his developmental disability. Severe Illness/Injury Pt hx unobtainable due to pt developmental disability and limited vocabulary.

## 2019-11-04 ENCOUNTER — NON-APPOINTMENT (OUTPATIENT)
Age: 69
End: 2019-11-04

## 2019-11-04 ENCOUNTER — APPOINTMENT (OUTPATIENT)
Dept: CARDIOLOGY | Facility: CLINIC | Age: 69
End: 2019-11-04
Payer: MEDICARE

## 2019-11-04 VITALS
SYSTOLIC BLOOD PRESSURE: 112 MMHG | OXYGEN SATURATION: 98 % | DIASTOLIC BLOOD PRESSURE: 78 MMHG | RESPIRATION RATE: 14 BRPM | BODY MASS INDEX: 24.16 KG/M2 | HEART RATE: 87 BPM | WEIGHT: 145 LBS | HEIGHT: 65 IN

## 2019-11-04 DIAGNOSIS — I49.3 VENTRICULAR PREMATURE DEPOLARIZATION: ICD-10-CM

## 2019-11-04 PROBLEM — N39.0 URINARY TRACT INFECTION, SITE NOT SPECIFIED: Chronic | Status: ACTIVE | Noted: 2019-10-03

## 2019-11-04 PROBLEM — I77.810 THORACIC AORTIC ECTASIA: Chronic | Status: ACTIVE | Noted: 2019-10-03

## 2019-11-04 PROCEDURE — 93000 ELECTROCARDIOGRAM COMPLETE: CPT

## 2019-11-04 PROCEDURE — 99214 OFFICE O/P EST MOD 30 MIN: CPT | Mod: 25

## 2019-11-04 RX ORDER — ARIPIPRAZOLE 20 MG/1
20 TABLET ORAL DAILY
Refills: 0 | Status: DISCONTINUED | COMMUNITY
End: 2019-11-04

## 2019-11-04 RX ORDER — ARIPIPRAZOLE 20 MG/1
20 TABLET ORAL
Refills: 0 | Status: DISCONTINUED | COMMUNITY
End: 2019-11-04

## 2019-11-04 NOTE — PHYSICAL EXAM
[General Appearance - Well Developed] : well developed [Normal Appearance] : normal appearance [General Appearance - Well Nourished] : well nourished [General Appearance - In No Acute Distress] : no acute distress [Normal Conjunctiva] : the conjunctiva exhibited no abnormalities [No Oral Pallor] : no oral pallor [No Oral Cyanosis] : no oral cyanosis [Respiration, Rhythm And Depth] : normal respiratory rhythm and effort [Exaggerated Use Of Accessory Muscles For Inspiration] : no accessory muscle use [Auscultation Breath Sounds / Voice Sounds] : lungs were clear to auscultation bilaterally [Heart Rate And Rhythm] : heart rate and rhythm were normal [Heart Sounds] : normal S1 and S2 [Edema] : no peripheral edema present [Bowel Sounds] : normal bowel sounds [Abdomen Soft] : soft [Abdomen Tenderness] : non-tender [] : no hepato-splenomegaly [Nail Clubbing] : no clubbing of the fingernails [Cyanosis, Localized] : no localized cyanosis [Skin Color & Pigmentation] : normal skin color and pigmentation [No Anxiety] : not feeling anxious [FreeTextEntry1] : mental retardation.

## 2019-11-04 NOTE — HISTORY OF PRESENT ILLNESS
[FreeTextEntry1] : Pt. presents today for routine follow-up accompanied with formal caregiver \par \par  He denies  chest pain, SOB, palpitation, presyncope, syncope or edema. He ambulates without ANDERSEN or chest pain though very limited due to unsteady gait . He ambulates with the assistance of a walker short distances but spends much of the day in his wheelchair. \par \par Is followed by his PCP  for tx. of DM and HTN. Has labs done there. \par \par Last echocardiogram 12/2018. ( see summary section above).  no change aorta size  4.1 cm \par \par

## 2019-11-04 NOTE — DISCUSSION/SUMMARY
[FreeTextEntry1] : \par Assessment:\par \par Hypertension: Controlled \par \par PVC's:  12/2018 echo with nl. LV systolic function mild dilated aortic root without much change from prior .\par \par Mild aortic root dilatation by echocardiogram -  no change in size in follow up echo  \par \par DM - as per PCP. .\par \par Cardiac status appears stable at this time. \par \par Advised to review all meds list is poorly updated\par \par  follow up after 6 months

## 2019-11-04 NOTE — REVIEW OF SYSTEMS
[see HPI] : see HPI [Convulsions] : convulsions [Negative] : Psychiatric [Shortness Of Breath] : no shortness of breath [Chest Pain] : no chest pain

## 2019-11-11 ENCOUNTER — APPOINTMENT (OUTPATIENT)
Dept: UROLOGY | Facility: CLINIC | Age: 69
End: 2019-11-11
Payer: MEDICARE

## 2019-11-11 PROCEDURE — 99213 OFFICE O/P EST LOW 20 MIN: CPT

## 2019-11-11 NOTE — ASSESSMENT
[FreeTextEntry1] : Pt stable on BPH meds, Finasteride stopped by cardiologist, unsure as to why.  The patient has been stable on this for some time.

## 2019-11-11 NOTE — HISTORY OF PRESENT ILLNESS
[FreeTextEntry1] : Pt is non-verbal wheelchair bound, presents with aide for followup. Pt has a diaper. Cardiologist had stopped finasteride. Pt is continuing with flomax daily. No hematuria

## 2019-11-11 NOTE — END OF VISIT
[FreeTextEntry3] : I am not sure why the Finasteride was discontinued and have reordered it.  I am happy to hear any reasons why this was\par discontinued, but until then, I recommend continuing

## 2019-11-11 NOTE — PHYSICAL EXAM
[General Appearance - Well Nourished] : well nourished [General Appearance - In No Acute Distress] : no acute distress [Abdomen Soft] : soft [Abdomen Tenderness] : non-tender [Edema] : no peripheral edema [General Appearance - Well Developed] : well developed [Well Groomed] : well groomed [Costovertebral Angle Tenderness] : no ~M costovertebral angle tenderness [Urinary Bladder Findings] : the bladder was normal on palpation [] : no rash [Respiration, Rhythm And Depth] : normal respiratory rhythm and effort [Exaggerated Use Of Accessory Muscles For Inspiration] : no accessory muscle use [Oriented To Time, Place, And Person] : oriented to person, place, and time [Affect] : the affect was normal [Mood] : the mood was normal [Not Anxious] : not anxious [FreeTextEntry1] : in wheel chair.  [No Focal Deficits] : no focal deficits [No Palpable Adenopathy] : no palpable adenopathy

## 2020-02-07 NOTE — ED PROVIDER NOTE - NS ED MD EM SELECTION
CHEST 1 VIEW



INDICATION: 

requirement for hemodialysis placement, r/o TB



COMPARISON: 

None



FINDINGS:



Support devices: Right subclavian line projected over the superior vena cava, with the tip at the lev
el of the atriocaval junction. 



Heart: Mildly enlarged 



Lungs/Pleura: Somewhat linear parenchymal density in both lung bases, most likely atelectasis. No con
vincing evidence of acute disease. No pneumothorax.  





IMPRESSION:

1. Central line in the superior vena cava, with no pneumothorax or acute disease.



Signer Name: Theo Thomas MD 

Signed: 2/7/2020 10:47 AM

 Workstation Name: [id number]

## 2020-04-21 NOTE — ED ADULT NURSE NOTE - NS ED NOTE ABUSE SUSPICION NEGLECT YN
April 21, 2020      To Whom It May Concern:      Naldo Cabral was seen in our Emergency Department today, 04/21/20.  I expect his condition to improve over the next 1 days.  He may return to work when improved.    Sincerely,        Treva Cervantes RN        
No

## 2020-06-29 ENCOUNTER — APPOINTMENT (OUTPATIENT)
Dept: CARDIOLOGY | Facility: CLINIC | Age: 70
End: 2020-06-29
Payer: MEDICARE

## 2020-06-29 ENCOUNTER — NON-APPOINTMENT (OUTPATIENT)
Age: 70
End: 2020-06-29

## 2020-06-29 VITALS
TEMPERATURE: 97.4 F | RESPIRATION RATE: 18 BRPM | BODY MASS INDEX: 24.16 KG/M2 | HEIGHT: 65 IN | SYSTOLIC BLOOD PRESSURE: 90 MMHG | OXYGEN SATURATION: 98 % | DIASTOLIC BLOOD PRESSURE: 66 MMHG | WEIGHT: 145 LBS | HEART RATE: 77 BPM

## 2020-06-29 DIAGNOSIS — I10 ESSENTIAL (PRIMARY) HYPERTENSION: ICD-10-CM

## 2020-06-29 PROCEDURE — 93000 ELECTROCARDIOGRAM COMPLETE: CPT

## 2020-06-29 PROCEDURE — 99214 OFFICE O/P EST MOD 30 MIN: CPT | Mod: 25

## 2020-06-29 RX ORDER — CARBIDOPA AND LEVODOPA 25; 250 MG/1; MG/1
25-250 TABLET ORAL
Qty: 90 | Refills: 0 | Status: DISCONTINUED | COMMUNITY
Start: 2017-07-10 | End: 2020-06-29

## 2020-06-29 RX ORDER — METFORMIN HYDROCHLORIDE 1000 MG/1
1000 TABLET, COATED ORAL
Refills: 0 | Status: DISCONTINUED | COMMUNITY
End: 2020-06-29

## 2020-06-30 NOTE — DISCUSSION/SUMMARY
[FreeTextEntry1] : Hypertension: Controlled \par \par Dilated AO will return for Echocardiogram/OV.\par \par DM - as per PCP. .\par \par Cardiac status appears stable at this time. \par \par  follow up after 6 months

## 2020-06-30 NOTE — PHYSICAL EXAM
[Normal Appearance] : normal appearance [General Appearance - Well Nourished] : well nourished [General Appearance - Well Developed] : well developed [Normal Conjunctiva] : the conjunctiva exhibited no abnormalities [General Appearance - In No Acute Distress] : no acute distress [No Oral Cyanosis] : no oral cyanosis [No Oral Pallor] : no oral pallor [] : no respiratory distress [Respiration, Rhythm And Depth] : normal respiratory rhythm and effort [Auscultation Breath Sounds / Voice Sounds] : lungs were clear to auscultation bilaterally [Heart Rate And Rhythm] : heart rate and rhythm were normal [Exaggerated Use Of Accessory Muscles For Inspiration] : no accessory muscle use [Bowel Sounds] : normal bowel sounds [Edema] : no peripheral edema present [Heart Sounds] : normal S1 and S2 [Abdomen Soft] : soft [Abdomen Tenderness] : non-tender [Nail Clubbing] : no clubbing of the fingernails [Cyanosis, Localized] : no localized cyanosis [No Anxiety] : not feeling anxious [Skin Color & Pigmentation] : normal skin color and pigmentation [FreeTextEntry1] : mentally challenged

## 2020-06-30 NOTE — HISTORY OF PRESENT ILLNESS
[FreeTextEntry1] : Pt. presents today for routine follow-up accompanied with formal caregiver with no complaints offered \par \par No CP, SOB, palpitation, presyncope, syncope or edema. He is wheel chair bound\par \par Is followed by his PCP  for tx. of DM Has labs done there. \par \par Last echocardiogram 12/2018. ( see summary section above).  \par \par

## 2020-08-19 ENCOUNTER — RX RENEWAL (OUTPATIENT)
Age: 70
End: 2020-08-19

## 2020-09-15 NOTE — ED ADULT NURSE NOTE - DOES PATIENT HAVE ADVANCE DIRECTIVE
----- Message from Kt Ricci MD sent at 9/14/2020  8:14 PM EDT -----  Let patient know echo test shows stable heart function, no new orders or changes at this time. Thanks.
No

## 2020-11-03 ENCOUNTER — APPOINTMENT (OUTPATIENT)
Dept: UROLOGY | Facility: CLINIC | Age: 70
End: 2020-11-03
Payer: MEDICARE

## 2020-11-03 PROCEDURE — 99213 OFFICE O/P EST LOW 20 MIN: CPT

## 2020-11-03 NOTE — END OF VISIT
[FreeTextEntry3] : Medications were refilled and labs were ordered.  We will follow-up in 1 year or as needed

## 2020-11-03 NOTE — PHYSICAL EXAM
[General Appearance - Well Developed] : well developed [General Appearance - Well Nourished] : well nourished [Well Groomed] : well groomed [General Appearance - In No Acute Distress] : no acute distress [Abdomen Soft] : soft [Abdomen Tenderness] : non-tender [Costovertebral Angle Tenderness] : no ~M costovertebral angle tenderness [Urinary Bladder Findings] : the bladder was normal on palpation [] : no respiratory distress [Respiration, Rhythm And Depth] : normal respiratory rhythm and effort [Exaggerated Use Of Accessory Muscles For Inspiration] : no accessory muscle use [Oriented To Time, Place, And Person] : oriented to person, place, and time [Affect] : the affect was normal [Mood] : the mood was normal [Not Anxious] : not anxious [FreeTextEntry1] : in wheel chair.  [No Focal Deficits] : no focal deficits [No Palpable Adenopathy] : no palpable adenopathy

## 2020-11-03 NOTE — HISTORY OF PRESENT ILLNESS
[FreeTextEntry1] : This patient is wheelchair bound and mentally challenged.  His aide states there has been no change in urinary habitus.  He needs his labs done and medications were refilled

## 2020-11-18 ENCOUNTER — RX RENEWAL (OUTPATIENT)
Age: 70
End: 2020-11-18

## 2020-12-21 ENCOUNTER — RX RENEWAL (OUTPATIENT)
Age: 70
End: 2020-12-21

## 2020-12-21 ENCOUNTER — APPOINTMENT (OUTPATIENT)
Dept: CARDIOLOGY | Facility: CLINIC | Age: 70
End: 2020-12-21

## 2020-12-21 PROBLEM — N39.0 ACUTE UTI: Status: RESOLVED | Noted: 2019-09-13 | Resolved: 2020-12-21

## 2021-01-19 ENCOUNTER — APPOINTMENT (OUTPATIENT)
Dept: CARDIOLOGY | Facility: CLINIC | Age: 71
End: 2021-01-19

## 2021-01-26 ENCOUNTER — APPOINTMENT (OUTPATIENT)
Dept: CARDIOLOGY | Facility: CLINIC | Age: 71
End: 2021-01-26

## 2021-02-09 ENCOUNTER — APPOINTMENT (OUTPATIENT)
Dept: CARDIOLOGY | Facility: CLINIC | Age: 71
End: 2021-02-09
Payer: MEDICARE

## 2021-02-09 ENCOUNTER — NON-APPOINTMENT (OUTPATIENT)
Age: 71
End: 2021-02-09

## 2021-02-09 VITALS
DIASTOLIC BLOOD PRESSURE: 74 MMHG | SYSTOLIC BLOOD PRESSURE: 94 MMHG | HEART RATE: 115 BPM | TEMPERATURE: 97.5 F | HEIGHT: 65 IN | WEIGHT: 145 LBS | BODY MASS INDEX: 24.16 KG/M2 | OXYGEN SATURATION: 99 %

## 2021-02-09 DIAGNOSIS — R31.0 GROSS HEMATURIA: ICD-10-CM

## 2021-02-09 DIAGNOSIS — K21.9 GASTRO-ESOPHAGEAL REFLUX DISEASE W/OUT ESOPHAGITIS: ICD-10-CM

## 2021-02-09 PROCEDURE — 93000 ELECTROCARDIOGRAM COMPLETE: CPT

## 2021-02-09 PROCEDURE — 99215 OFFICE O/P EST HI 40 MIN: CPT

## 2021-02-09 RX ORDER — INSULIN DEGLUDEC INJECTION 100 U/ML
100 INJECTION, SOLUTION SUBCUTANEOUS
Qty: 15 | Refills: 0 | Status: DISCONTINUED | COMMUNITY
Start: 2019-04-29 | End: 2021-02-09

## 2021-02-09 RX ORDER — DENOSUMAB 60 MG/ML
60 INJECTION SUBCUTANEOUS
Refills: 0 | Status: DISCONTINUED | COMMUNITY
End: 2021-02-09

## 2021-02-09 RX ORDER — QUETIAPINE FUMARATE 50 MG/1
50 TABLET ORAL
Qty: 30 | Refills: 0 | Status: DISCONTINUED | COMMUNITY
Start: 2017-08-04 | End: 2021-02-09

## 2021-02-09 RX ORDER — ARIPIPRAZOLE 20 MG/1
20 TABLET ORAL DAILY
Refills: 0 | Status: ACTIVE | COMMUNITY

## 2021-02-09 RX ORDER — TAMSULOSIN HYDROCHLORIDE 0.4 MG/1
0.4 CAPSULE ORAL
Qty: 180 | Refills: 3 | Status: DISCONTINUED | COMMUNITY
Start: 2019-09-13 | End: 2021-02-09

## 2021-02-09 RX ORDER — ARIPIPRAZOLE 30 MG/1
30 TABLET ORAL DAILY
Refills: 0 | Status: DISCONTINUED | COMMUNITY
End: 2021-02-09

## 2021-02-09 RX ORDER — LEVETIRACETAM 750 MG/1
750 TABLET, FILM COATED ORAL TWICE DAILY
Refills: 0 | Status: DISCONTINUED | COMMUNITY
End: 2021-02-09

## 2021-02-09 NOTE — DISCUSSION/SUMMARY
[FreeTextEntry1] : Hypertension: Controlled \par \par Dilated AO will return for Echocardiogram t o monitor aortic room size prior to next visit \par normal lipid profile \par \par DM - as per PCP. .\par \par Cardiac status appears stable at this time. \par \par  follow up after 6 months 
stated

## 2021-02-09 NOTE — PHYSICAL EXAM
[General Appearance - Well Developed] : well developed [Normal Appearance] : normal appearance [General Appearance - Well Nourished] : well nourished [General Appearance - In No Acute Distress] : no acute distress [Normal Conjunctiva] : the conjunctiva exhibited no abnormalities [No Oral Pallor] : no oral pallor [No Oral Cyanosis] : no oral cyanosis [] : no respiratory distress [Respiration, Rhythm And Depth] : normal respiratory rhythm and effort [Exaggerated Use Of Accessory Muscles For Inspiration] : no accessory muscle use [Auscultation Breath Sounds / Voice Sounds] : lungs were clear to auscultation bilaterally [Heart Rate And Rhythm] : heart rate and rhythm were normal [Heart Sounds] : normal S1 and S2 [Edema] : no peripheral edema present [Bowel Sounds] : normal bowel sounds [Abdomen Soft] : soft [Abdomen Tenderness] : non-tender [Nail Clubbing] : no clubbing of the fingernails [Cyanosis, Localized] : no localized cyanosis [Skin Color & Pigmentation] : normal skin color and pigmentation [No Anxiety] : not feeling anxious [FreeTextEntry1] : mentally challenged

## 2021-02-09 NOTE — REVIEW OF SYSTEMS
[Earache] : no earache [see HPI] : see HPI [Shortness Of Breath] : no shortness of breath [Chest Pain] : no chest pain [Convulsions] : convulsions [Negative] : Heme/Lymph

## 2021-02-09 NOTE — HISTORY OF PRESENT ILLNESS
[FreeTextEntry1] : Pt. with hx DM HTN  wheel chair bound presents today for routine follow-up accompanied with formal caregiver with no complaints offered  . patient did have covid vaccine with some side effects  , resolved , \par \par No CP, SOB, palpitation, presyncope, syncope or edema. He is wheel chair bound  patient does not walk at all \par \par Is followed by his PCP  for tx. of DM  his last lipid profile  normal range  done in 6/23/20 his BP low normal range \par \par Last echocardiogram 12/2018. ( see summary section above).  \par \par

## 2021-02-16 ENCOUNTER — RX RENEWAL (OUTPATIENT)
Age: 71
End: 2021-02-16

## 2021-05-24 NOTE — ED PROVIDER NOTE - PROGRESS NOTE DETAILS
Render Note In Bullet Format When Appropriate: No
Medical Necessity Information: It is in your best interest to select a reason for this procedure from the list below. All of these items fulfill various CMS LCD requirements except the new and changing color options.
Post-Care Instructions: I reviewed with the patient in detail post-care instructions. Patient is to wear sunprotection, and avoid picking at any of the treated lesions. Pt may apply Vaseline to crusted or scabbing areas.
Detail Level: Detailed
Consent: The patient's consent was obtained including but not limited to risks of crusting, scabbing, blistering, scarring, darker or lighter pigmentary change, recurrence, incomplete removal and infection.
Medical Necessity Clause: This procedure was medically necessary because the lesions that were treated were:
pt with mild lower abd ttp.  states pt is weak decreased eating not ambulating. ct with severe proctitis. will treat with abx and admit. Rudy Garrison M.D., Attending Physician

## 2021-08-16 ENCOUNTER — APPOINTMENT (OUTPATIENT)
Dept: CARDIOLOGY | Facility: CLINIC | Age: 71
End: 2021-08-16

## 2021-09-16 ENCOUNTER — RX RENEWAL (OUTPATIENT)
Age: 71
End: 2021-09-16

## 2021-09-16 RX ORDER — OXYBUTYNIN CHLORIDE 10 MG/1
10 TABLET, EXTENDED RELEASE ORAL
Qty: 30 | Refills: 3 | Status: ACTIVE | COMMUNITY
Start: 2017-02-22 | End: 1900-01-01

## 2021-11-01 ENCOUNTER — APPOINTMENT (OUTPATIENT)
Dept: CARDIOLOGY | Facility: CLINIC | Age: 71
End: 2021-11-01
Payer: MEDICARE

## 2021-11-01 ENCOUNTER — NON-APPOINTMENT (OUTPATIENT)
Age: 71
End: 2021-11-01

## 2021-11-01 VITALS
DIASTOLIC BLOOD PRESSURE: 78 MMHG | OXYGEN SATURATION: 96 % | SYSTOLIC BLOOD PRESSURE: 132 MMHG | HEART RATE: 73 BPM | HEIGHT: 65 IN

## 2021-11-01 VITALS — SYSTOLIC BLOOD PRESSURE: 120 MMHG | DIASTOLIC BLOOD PRESSURE: 78 MMHG

## 2021-11-01 DIAGNOSIS — I77.810 THORACIC AORTIC ECTASIA: ICD-10-CM

## 2021-11-01 DIAGNOSIS — R94.31 ABNORMAL ELECTROCARDIOGRAM [ECG] [EKG]: ICD-10-CM

## 2021-11-01 DIAGNOSIS — E11.9 TYPE 2 DIABETES MELLITUS W/OUT COMPLICATIONS: ICD-10-CM

## 2021-11-01 DIAGNOSIS — I95.1 ORTHOSTATIC HYPOTENSION: ICD-10-CM

## 2021-11-01 PROCEDURE — 99214 OFFICE O/P EST MOD 30 MIN: CPT

## 2021-11-01 PROCEDURE — 93306 TTE W/DOPPLER COMPLETE: CPT

## 2021-11-01 PROCEDURE — 93000 ELECTROCARDIOGRAM COMPLETE: CPT

## 2021-11-01 RX ORDER — METFORMIN HYDROCHLORIDE 1000 MG/1
1000 TABLET, COATED ORAL TWICE DAILY
Qty: 180 | Refills: 3 | Status: ACTIVE | COMMUNITY

## 2021-11-01 RX ORDER — ARIPIPRAZOLE 5 MG/1
5 TABLET ORAL
Qty: 30 | Refills: 0 | Status: ACTIVE | COMMUNITY
Start: 2021-10-08

## 2021-11-01 RX ORDER — INSULIN GLARGINE 100 [IU]/ML
100 INJECTION, SOLUTION SUBCUTANEOUS
Refills: 0 | Status: ACTIVE | COMMUNITY
Start: 2021-10-15

## 2021-11-01 RX ORDER — GLIMEPIRIDE 1 MG/1
1 TABLET ORAL DAILY
Refills: 0 | Status: ACTIVE | COMMUNITY

## 2021-11-01 RX ORDER — PANTOPRAZOLE 40 MG/1
40 TABLET, DELAYED RELEASE ORAL DAILY
Refills: 0 | Status: ACTIVE | COMMUNITY

## 2021-11-01 RX ORDER — ARIPIPRAZOLE 2 MG/1
2 TABLET ORAL
Qty: 30 | Refills: 0 | Status: ACTIVE | COMMUNITY
Start: 2021-10-08

## 2021-11-01 RX ORDER — MULTIVIT-MIN/FOLIC/VIT K/LYCOP 400-300MCG
25 MCG TABLET ORAL
Qty: 90 | Refills: 0 | Status: ACTIVE | COMMUNITY

## 2021-11-01 RX ORDER — MIDODRINE HYDROCHLORIDE 5 MG/1
5 TABLET ORAL TWICE DAILY
Refills: 0 | Status: ACTIVE | COMMUNITY

## 2021-11-01 RX ORDER — ALBUTEROL SULFATE 90 UG/1
108 (90 BASE) INHALANT RESPIRATORY (INHALATION)
Qty: 8 | Refills: 0 | Status: ACTIVE | COMMUNITY
Start: 2021-10-13

## 2021-11-01 RX ORDER — SITAGLIPTIN 100 MG/1
100 TABLET, FILM COATED ORAL DAILY
Refills: 0 | Status: ACTIVE | COMMUNITY

## 2021-11-01 RX ORDER — TRIAMCINOLONE ACETONIDE 1 MG/G
0.1 OINTMENT TOPICAL
Qty: 30 | Refills: 0 | Status: ACTIVE | COMMUNITY
Start: 2021-08-25

## 2021-11-01 RX ORDER — ROPINIROLE 0.25 MG/1
0.25 TABLET, FILM COATED ORAL 3 TIMES DAILY
Refills: 0 | Status: ACTIVE | COMMUNITY

## 2021-11-01 RX ORDER — CARBIDOPA AND LEVODOPA 25; 100 MG/1; MG/1
25-100 TABLET ORAL
Refills: 0 | Status: ACTIVE | COMMUNITY

## 2021-11-01 RX ORDER — ALBUTEROL SULFATE 2.5 MG/3ML
(2.5 MG/3ML) SOLUTION RESPIRATORY (INHALATION)
Qty: 75 | Refills: 0 | Status: ACTIVE | COMMUNITY
Start: 2021-09-29

## 2021-11-01 RX ORDER — LEVETIRACETAM 250 MG/1
250 TABLET, FILM COATED ORAL TWICE DAILY
Refills: 0 | Status: ACTIVE | COMMUNITY

## 2021-11-01 NOTE — DISCUSSION/SUMMARY
[FreeTextEntry1] : Patient with above hx \par \par orthostatic hypotension , now stable BP , on midodrine , continue hydration and midodrine   \par \par chronic mild LE edema , dependent edema wheel chair bound, pressure stocking recommended ,  would obtain echo \par \par Dilated AO will return for Echocardiogram t o monitor aortic room size prior to next visit \par normal lipid profile \par \par DM - controlled on current regimen , \par \par Parkinson disease /orthostatic hypotension  continue current medication \par \par Anemia :  recommended further work , follow up with primary \par \par

## 2021-11-01 NOTE — CARDIOLOGY SUMMARY
[de-identified] : 11/1/21 artifacts  sinus rhythm Low Qrs  in pre cardial leads  [de-identified] : 12/13/18   EF 55% Mild dilated aortic root 4.1 cm ,  trace TR PI

## 2021-11-01 NOTE — HISTORY OF PRESENT ILLNESS
[FreeTextEntry1] : Pt. with hx DM HTN now orthostatic hypotension   wheel chair bound presents today for routine follow-up accompanied with formal caregiver with no complaints offered  \par \par No CP, SOB, palpitation, presyncope, syncope or edema. He is wheel chair bound  patient does not walk at all \par patient does have mild chronic LE edema for which he wear pressure stocking \par \par Is followed by his PCP  for tx. of DM  his last lipid profile  normal range 2020,       his BP controlled \par last [id number] 7.2   hemoglobin 9.9 \par \par Last echocardiogram 12/2018 showed dilated aortic root 4.1 cm \par \par

## 2021-11-22 ENCOUNTER — APPOINTMENT (OUTPATIENT)
Dept: UROLOGY | Facility: CLINIC | Age: 71
End: 2021-11-22
Payer: MEDICARE

## 2021-11-22 DIAGNOSIS — N40.0 BENIGN PROSTATIC HYPERPLASIA WITHOUT LOWER URINARY TRACT SYMPMS: ICD-10-CM

## 2021-11-22 PROCEDURE — 99213 OFFICE O/P EST LOW 20 MIN: CPT

## 2021-11-22 NOTE — ASSESSMENT
[FreeTextEntry1] : His labs are fine and he is incontinent so the Oxybutinin serves no purpose at th is time

## 2021-11-22 NOTE — PHYSICAL EXAM
[General Appearance - Well Developed] : well developed [General Appearance - Well Nourished] : well nourished [Well Groomed] : well groomed [General Appearance - In No Acute Distress] : no acute distress [Abdomen Soft] : soft [Abdomen Tenderness] : non-tender [Costovertebral Angle Tenderness] : no ~M costovertebral angle tenderness [Urinary Bladder Findings] : the bladder was normal on palpation [] : no respiratory distress [Respiration, Rhythm And Depth] : normal respiratory rhythm and effort [Exaggerated Use Of Accessory Muscles For Inspiration] : no accessory muscle use [Oriented To Time, Place, And Person] : oriented to person, place, and time [Affect] : the affect was normal [Not Anxious] : not anxious [Mood] : the mood was normal [FreeTextEntry1] : in wheel chair.  [No Focal Deficits] : no focal deficits [No Palpable Adenopathy] : no palpable adenopathy

## 2021-11-22 NOTE — HISTORY OF PRESENT ILLNESS
[FreeTextEntry1] : This patient is wheelchair-bound.  He is in diapers and generally incontinent.  He is on oxybutynin, tamsulosin, finasteride,.  Is a questions whether he needs to be on these medications.

## 2021-11-22 NOTE — END OF VISIT
[FreeTextEntry3] : The oxybutynin can be discontinued and he can follow-up from this point on at his facility as he has become very debilitated in the last year or so.

## 2021-12-10 RX ORDER — TAMSULOSIN HYDROCHLORIDE 0.4 MG/1
0.4 CAPSULE ORAL
Qty: 90 | Refills: 3 | Status: ACTIVE | COMMUNITY
Start: 2021-11-22 | End: 1900-01-01

## 2021-12-10 RX ORDER — FINASTERIDE 5 MG/1
5 TABLET, FILM COATED ORAL
Qty: 90 | Refills: 3 | Status: ACTIVE | COMMUNITY
Start: 2019-09-13 | End: 1900-01-01

## 2022-07-27 NOTE — PATIENT PROFILE ADULT - HAS THE PATIENT EXPERIENCED ANY OF THE FOLLOWING WITHIN THE WEEK PRIOR TO ADMISSION?
Subjective:  History was provided by the mother. Jenae Mahan is a 7 y.o. female who is brought in by her mother and father for this well child visit. Common ambulatory SmartLinks: Patient's medications, allergies, past medical, surgical, social and family histories were reviewed and updated as appropriate. Immunization History   Administered Date(s) Administered    DTaP (Infanrix) 2013, 2013, 2013, 10/29/2014, 09/16/2019    Hepatitis A Ped/Adol (Havrix, Vaqta) 12/12/2014, 06/15/2015    Hepatitis B Ped/Adol (Engerix-B, Recombivax HB) 2013, 2013, 2013    Hib PRP-OMP (PedvaxHIB) 2013, 2013, 2013    Influenza Virus Vaccine 11/12/2019, 12/03/2019, 09/29/2020, 10/18/2021    MMR 03/18/2014, 01/30/2019    Varicella (Varivax) 08/14/2019, 12/03/2019     Ever hospitalized? No  Had surgery? No  Takes meds? No  Allergies? No  During or after exercise has patient passed out? No  Felt dizzy? No  Had chest pain? No  Had high blood pressure? No  Heart murmur? No  Racing heart or skipped heartbeats? No  Family death of heart problems/suddenly before age 48? No  Skin problems? No  Head injury? No  Knocked out/unconscious? No  Seizures? No  Stinger, burner, or pinched nerve? No  Heat or muscle cramps? No  Passed out or dizzy from heat? No  Trouble breathing/cough during/after activity? No  Use of special equipment? No  Problem with eyes/vision? No  Bone or joint injuries? No  Eating disorder/concerns about weight? No  Chronic medical illnesses? No  Changes in medical hx since last visit? No  Take supplements (if yes, list)? No    Current Issues:  Current concerns on the part of Erika's mother and father include strong body odor. Every month we will get bilateral breast tenderness, no masses no nipple discharge, no abnormal hair growth on chin chest etc.  Does have some coarse hair growth underneath arms and pubic area, no menses yet.     Review of Lifestyle habits:  Patient has the following healthy dietary habits:  eats a healthy breakfast, limits sugary drinks and foods, such as juice/soda/candy, limits fried and fast foods, and limits processed foods  Current unhealthy dietary habits: none  Amount of screen time daily: 2 hours  Amount of daily physical activity:  1 hour  Amount of Sleep each night: 9 hours  Quality of sleep:  normal  How often does patient see the dentist?  Every 2 years  How many times a day does patient brush her teeth? 2  Does patient floss? No:     Social/Behavioral Screening:  Who do you live with? mom, dad, and brother sister  Discipline concerns?: no  Discipline methods:   \"the mom look\"  Are you involved in extra-curricular activities? yes - taekwondo  Does patient struggle with feeling stressed or worried often? no  Is patient able to control and self regulate emotions? Yes  Does patient exhibit compassion and empathy? Yes  Is Internet use supervised? yes - has time limit and content                                                                    What Grade in school: 4  School issues:  none                                                                                      Social Determinants of Health:  Child is exposed to the following neighborhood or family violence: none  Within the last 12 months have you worried about having enough money to buy food? no  Are there any problems with your current living situation? no  Parental coping and self-care: doing well  Secondhand smoke exposure (regular or electronic cigarettes): no   Domestic violence in the home: no  Does patient have good self esteem? Yes  Does patient has family support?:  yes, child has a caring and supportive relationship with family  Does patient have good social support with friends?    Yes    Last visit with eye doctor was in June Vision and Hearing Screening  (vision at 17 yo and 14 yo visit)   (hearing once between 11&13 yo, once between 15&18 yo, once between 18-23 yo:  Must include up 6000 and 8000 Hz to look for high freq hearing loss caused by loud noise exposure)    No results found. ROS:   Constitutional:  Negative for fatigue   HENT:  Negative for congestion, rhinitis, sore throat, normal hearing  Eyes:  No vision issues  Resp:  Negative for SOB, wheezing, cough  Cardiovascular: Negative for CP,   Gastrointestinal: Negative for abd pain and N/V, normal BMs  :  Negative for dysuria and enuresis,   pubertal development: developing genital hair  Musculoskeletal:  Negative for myalgias  Skin: Negative for rash, change in moles, and sunburn. Acne:none   Neuro:  Negative for dizziness, headache, syncopal episodes  Psych: negative for depression or anxiety    Objective:        Vitals:    07/27/22 0817   BP: 98/68   Site: Left Upper Arm   Position: Sitting   Cuff Size: Medium Adult   Pulse: 68   Resp: 17   Temp: 97 °F (36.1 °C)   SpO2: 99%   Weight: 98 lb (44.5 kg)   Height: 4' 9\" (1.448 m)     growth parameters are noted and are appropriate for age. Constitutional: Alert, appears stated age, cooperative,   Ears: Tympanic membrane, external ear and ear canal normal bilaterally  Nose: nasal mucosa w/o erythema or edema. Mouth/Throat: Oropharynx is clear and moist, and mucous membranes are normal.  No dental decay. Gingiva without erythema or swelling  Eyes: white sclera, extraocular motions are intact. PERRL, red reflex present bilaterally  Neck: Neck supple. No JVD present. Carotid bruits are not present. No mass and no thyromegaly present. No cervical adenopathy. Cardiovascular: Normal rate, regular rhythm, normal heart sounds and intact distal pulses. No murmur, rubs or gallops,    Pulmonary/Chest: Effort normal.  Clear to auscultation bilaterally. She has no wheezes, rhonchi or rales.    Abdominal: Soft, non-tender. Bowel sounds and aorta are normal. She exhibits no organomegaly, mass or bruit. Genitourinary:not examined  Nabeel stage:  II  Musculoskeletal: Negative for myalgias  Normal Gait. Cervical and lumbar spine with full ROM w/o pain. No scoliosis. Bilateral shoulders/elbows/wrists/fingers, bilateral hips/knees/ankles/toes all w/o swelling and full ROM w/o pain  Neurological: Grossly normal without focal deficits. Alert and oriented x 3. Reflexes normal and symmetric. Skin: Skin is warm and dry. There is no rash or erythema. No suspicious lesions noted. Acne:none. No acanthosis nigricans, no signs of abuse or self inflicted injury. Psychiatric: She has a normal mood and affect. Her speech is normal and behavior is normal. Judgment, cognition and memory are normal.                                                                                                                                                                                                     Assessment/Plan:     1. Encounter for routine child health examination without abnormal findings  -Discussed normal development, reassured mother that okay for body odor and use of deodorant  - Normal development for breast tenderness, gave her signs to watch out for that would be more concerning like nipple discharge unilateral swelling, increase in pain that does not go away, masses etc.    Sports physical form filled out and given back to mom today. Cleared for sports    Vaccine records copied and will uploaded media    - POCT Urinalysis no Micro  - 84111 - PA VISUAL SCREENING TEST, BILAT    2. Encounter for dietary counseling and surveillance      3. Exercise counseling      4.  Pediatric body mass index (BMI) of 85th percentile to less than 95th percentile for age                                                                                                                           Preventive Plan/anticipatory guidance: Discussed the no

## 2022-07-31 NOTE — ED ADULT NURSE NOTE - URINE CHARACTERISTICS
UA Positive   Urine culture positive with E Coli and Proteus Mirabilis  O IV Rocephin while waiting sensitivity result  Patient is hydrating well thus will avoid iv fluid cloudy

## 2022-08-08 NOTE — ED ADULT NURSE NOTE - NSIMPLEMENTINTERV_GEN_ALL_ED
Implemented All Fall Risk Interventions:  [place] to call system. Call bell, personal items and telephone within reach. Instruct patient to call for assistance. Room bathroom lighting operational. Non-slip footwear when patient is off stretcher. Physically safe environment: no spills, clutter or unnecessary equipment. Stretcher in lowest position, wheels locked, appropriate side rails in place. Provide visual cue, wrist band, yellow gown, etc. Monitor gait and stability. Monitor for mental status changes and reorient to person, place, and time. Review medications for side effects contributing to fall risk. Reinforce activity limits and safety measures with patient and family.
No

## 2022-10-26 NOTE — H&P ADULT - NSHPSOURCEINFORD_GEN_ALL_CORE
Chart(s)/Home Health Aide or Other Non-Family Caregiver Myrbetriq discontinued and VESIcare 10 mg daily was sent to her pharmacy  Recommend follow-up in 3 months for re-evaluation and PVR assessment

## 2023-03-04 NOTE — PROGRESS NOTE ADULT - ASSESSMENT
67yo M pmh HTN, DM-2, PD, Sz dirorder, mod MR BPH, GERD, admitted with esophagitis and uncontrolled hyperglycemia  Patient with moderate MR admitted with uncontrolled diabetes and hematemesis.   Pt was treated in ICU with insulin gtt. No evidence of DKA. No AG. PT sugars now better controlled.   CTAP c/w stercoral colitis/esophagitis  No GI intervention planned      #Acute blood loss anemia   UGIB  Uncontrolled DM-2  Leukocytosis  Elevated lactate  Esophagitis  Stercoral colitis  HTN      Recc:  BGM now better controlled  No AG, elevated lactate was likely an early elevation d/t impending DKA  Continue present management  Leukocytosis- CXR and UA not suggestive of infection however now c/o cough.   Obtain CXR to r/o asp pna in setting of hematemesis  Cont PPI for esophagitis  Serial H/H. Type and screen. Transfuse if < 7 or if symptomatic (presently has no symptoms)  Start dulcolax enemas  BP control  PT consult    Total time during encounter: 60 min Stable

## 2023-04-03 NOTE — ED PROVIDER NOTE - EYES, MLM
Spoke with [facility]elli [facility] office to schedule patients appointment.     April 18th 1pm  Nelli Khanna      Patient is aware Clear bilaterally, pupils equal, round and reactive to light. Wheelchair

## 2023-06-26 NOTE — ED ADULT TRIAGE NOTE - PAIN RATING/NUMBER SCALE (0-10): REST
0 Elidel Counseling: Patient may experience a mild burning sensation during topical application. Elidel is not approved in children less than 2 years of age. There have been case reports of hematologic and skin malignancies in patients using topical calcineurin inhibitors although causality is questionable.

## 2023-06-27 NOTE — DISCHARGE NOTE ADULT - ABILITY TO HEAR (WITH HEARING AID OR HEARING APPLIANCE IF NORMALLY USED):
Adequate: hears normal conversation without difficulty
Mallampati IV  Uses CPAP machine  Patient instructed to bring CPAP machine day of surgery, verbalized understanding.  Medical clearance pending.

## 2024-01-24 NOTE — ED ADULT TRIAGE NOTE - AS O2 DELIVERY
Specialty Pharmacy Refill Coordination Note     Harinder is a 40 y.o. male contacted today regarding refills of  1 specialty medication(s).    Reviewed and verified with patient:       Specialty medication(s) and dose(s) confirmed: yes    Refill Questions      Flowsheet Row Most Recent Value   Changes to allergies? No   Changes to medications? No   New conditions or infections since last clinic visit No   Unplanned office visit, urgent care, ED, or hospital admission in the last 4 weeks  No   How does patient/caregiver feel medication is working? Excellent   Financial problems or insurance changes  No   Since the previous refill, were any specialty medication doses or scheduled injections missed or delayed?  No   Does this patient require a clinical escalation to a pharmacist? No            Delivery Questions      Flowsheet Row Most Recent Value   Delivery method Beeline   Delivery address verified with patient/caregiver? Yes   Delivery address Home   Number of medications in delivery 1   Medication(s) being filled and delivered Rimegepant Sulfate   Doses left of specialty medications 1 week   Copay verified? Yes   Copay amount $0   Copay form of payment No copayment ($0)                   Follow-up: 25 day(s)     Shadia Rene  Specialty Pharmacy Technician        
room air

## 2024-05-15 NOTE — ED PROVIDER NOTE - QRS
Render In Strict Bullet Format?: No
Detail Level: Zone
Continue Regimen: clobetasol 0.05 % scalp solution
92

## 2024-09-19 NOTE — ED ADULT TRIAGE NOTE - NS ED NURSE AMBULANCES
Left msg to call back to reschedule.   [facility]

## 2025-05-19 NOTE — ED ADULT NURSE NOTE - PAIN RATING/NUMBER SCALE (0-10): ACTIVITY
ANTICOAGULATION     Melvin Abad is overdue for an INR check.     Patient remains in  [facility] () per chart review. Postpone reminder one week.    Neeta Cheung RN  5/19/2025  Anticoagulation Clinic  [facility] for routing messages: cristi COLON  [facility] patient phone line: [phone]   0